# Patient Record
Sex: MALE | Race: WHITE | NOT HISPANIC OR LATINO | Employment: PART TIME | URBAN - METROPOLITAN AREA
[De-identification: names, ages, dates, MRNs, and addresses within clinical notes are randomized per-mention and may not be internally consistent; named-entity substitution may affect disease eponyms.]

---

## 2017-02-08 ENCOUNTER — TRANSFERRED RECORDS (OUTPATIENT)
Dept: HEALTH INFORMATION MANAGEMENT | Facility: CLINIC | Age: 37
End: 2017-02-08

## 2017-04-27 ENCOUNTER — TRANSFERRED RECORDS (OUTPATIENT)
Dept: HEALTH INFORMATION MANAGEMENT | Facility: CLINIC | Age: 37
End: 2017-04-27

## 2019-02-20 ENCOUNTER — TRANSFERRED RECORDS (OUTPATIENT)
Dept: HEALTH INFORMATION MANAGEMENT | Facility: CLINIC | Age: 39
End: 2019-02-20

## 2019-03-04 ENCOUNTER — TRANSFERRED RECORDS (OUTPATIENT)
Dept: HEALTH INFORMATION MANAGEMENT | Facility: CLINIC | Age: 39
End: 2019-03-04

## 2019-03-08 ENCOUNTER — TRANSFERRED RECORDS (OUTPATIENT)
Dept: HEALTH INFORMATION MANAGEMENT | Facility: CLINIC | Age: 39
End: 2019-03-08

## 2019-03-25 ENCOUNTER — TRANSFERRED RECORDS (OUTPATIENT)
Dept: HEALTH INFORMATION MANAGEMENT | Facility: CLINIC | Age: 39
End: 2019-03-25

## 2019-03-28 ENCOUNTER — MEDICAL CORRESPONDENCE (OUTPATIENT)
Dept: HEALTH INFORMATION MANAGEMENT | Facility: CLINIC | Age: 39
End: 2019-03-28

## 2019-05-09 ENCOUNTER — PATIENT OUTREACH (OUTPATIENT)
Dept: SURGERY | Facility: CLINIC | Age: 39
End: 2019-05-09

## 2019-05-09 NOTE — PROGRESS NOTES
CT scan done 3/4/19 at [facility] in [place], [place].  Called and disc will be mailed for upcoming appointment with Dr. Kemp.  Report available for review.

## 2019-05-17 ENCOUNTER — TELEPHONE (OUTPATIENT)
Dept: SURGERY | Facility: CLINIC | Age: 39
End: 2019-05-17

## 2019-05-17 NOTE — TELEPHONE ENCOUNTER
Pre Visit Call and Assessment    Date of call:  05/17/2019    Phone numbers:  Other phone number:  Nurse station at [facility] nurse Fleming confirmed appointment:  Yes  Patient care team/Primary provider:  No primary care provider on file.  Preferred outpatient pharmacy:  No Pharmacies Listed  Referred to:  Dr. Xavier Kemp    Reason for visit:  Ventral hernia,    Problem List:  There is no problem list on file for this patient.      Allergies:   Allergies not on file    History:      No past medical history on file.    No past surgical history on file.    No family history on file.    Social History     Tobacco Use     Smoking status: Not on file   Substance Use Topics     Alcohol use: Not on file

## 2019-05-20 ENCOUNTER — OFFICE VISIT (OUTPATIENT)
Dept: SURGERY | Facility: CLINIC | Age: 39
End: 2019-05-20
Payer: COMMERCIAL

## 2019-05-20 VITALS
HEART RATE: 59 BPM | DIASTOLIC BLOOD PRESSURE: 82 MMHG | TEMPERATURE: 98.4 F | HEIGHT: 69 IN | OXYGEN SATURATION: 99 % | WEIGHT: 270 LBS | BODY MASS INDEX: 39.99 KG/M2 | SYSTOLIC BLOOD PRESSURE: 121 MMHG

## 2019-05-20 DIAGNOSIS — K43.9 VENTRAL HERNIA WITHOUT OBSTRUCTION OR GANGRENE: Primary | ICD-10-CM

## 2019-05-20 ASSESSMENT — PAIN SCALES - GENERAL: PAINLEVEL: MODERATE PAIN (4)

## 2019-05-20 ASSESSMENT — MIFFLIN-ST. JEOR: SCORE: 2130.09

## 2019-05-20 NOTE — LETTER
"5/20/2019       RE: Yo East  [address] [place] [id number]     Dear Colleague,    Thank you for referring your patient, Yo East, to the [facility] GENERAL SURGERY at [facility]. Please see a copy of my visit note below.    Yo East is a 39 year old male with a many year history of a upper midline abdominal mass associated with the following symptoms of lump and pain.    Obstructive symptoms:  no  Urinary difficulties:  no  Chronic cough: no  Constipation:  At times.  Current level of activity:  Low, currently incarcerated    Past medical history, medications, allergies, family history, and social history were reviewed with the patient. Original surgery 2014 for diverticular disease, subsequent has had incisional ventral hernia recurrence twice.  Tobacco - denies  Diabetes - denies  No past medical history on file.    No past surgical history on file.    ROS: 10 point review of systems negative except noted in HPI  CT scan done 3/4/2019 reviewed showing recurrence with mesh remnant wide diastasis associated.    PHYSICAL EXAM  General appearance- alert, and in no distress.  Skin- Skin color and turgor normal.  No obvious rashes.  Neck- Neck is supple without obvious adenopathy.  Lungs- Respiratory effort unlabored.  BMI is estimated at 40 with weight by report of 270 lbs at 5'9\"  Abdomen - overweight, soft non distended, non tender with wide based upper abdominal hernia.    Impression: Recurrent incisional ventral hernia with wide diastasis. Given multiple recurrences, mesh in place and obesity would not recommend surgical intervention.  Recommendation: continued use of binder. Weight loss to goal of 200 lbs over 6 months with weight check when hits goal weight then return for 6 month follow to check weight maintained at goal.  Of note, during interview, patient rather adamant about having surgery and I suggested he seek a second opinion.  The total time spent " with this patient was 30 minutes.  Of this time, greater than 50% was spent counseling and coordinating care.              Again, thank you for allowing me to participate in the care of your patient.      Sincerely,    Xavier Kemp MD

## 2019-05-20 NOTE — LETTER
Date:May 22, 2019      Patient was self referred, no letter generated. Do not send.        [facility] Health Information

## 2019-05-20 NOTE — PROGRESS NOTES
"Yo East is a 39 year old male with a many year history of a upper midline abdominal mass associated with the following symptoms of lump and pain.    Obstructive symptoms:  no  Urinary difficulties:  no  Chronic cough: no  Constipation:  At times.  Current level of activity:  Low, currently incarcerated    Past medical history, medications, allergies, family history, and social history were reviewed with the patient. Original surgery 2014 for diverticular disease, subsequent has had incisional ventral hernia recurrence twice.  Tobacco - denies  Diabetes - denies  No past medical history on file.    No past surgical history on file.    ROS: 10 point review of systems negative except noted in HPI  CT scan done 3/4/2019 reviewed showing recurrence with mesh remnant wide diastasis associated.    PHYSICAL EXAM  General appearance- alert, and in no distress.  Skin- Skin color and turgor normal.  No obvious rashes.  Neck- Neck is supple without obvious adenopathy.  Lungs- Respiratory effort unlabored.  BMI is estimated at 40 with weight by report of 270 lbs at 5'9\"  Abdomen - overweight, soft non distended, non tender with wide based upper abdominal hernia.    Impression: Recurrent incisional ventral hernia with wide diastasis. Given multiple recurrences, mesh in place and obesity would not recommend surgical intervention.  Recommendation: continued use of binder. Weight loss to goal of 200 lbs over 6 months with weight check when hits goal weight then return for 6 month follow to check weight maintained at goal.  Of note, during interview, patient rather adamant about having surgery and I suggested he seek a second opinion.  The total time spent with this patient was 30 minutes.  Of this time, greater than 50% was spent counseling and coordinating care.            "

## 2019-05-20 NOTE — PATIENT INSTRUCTIONS
You met with Dr. Xavier Kemp.      Today's visit instructions:    As recommended obtain a goal weight of 200lbs. Return for follow, up with Dr. Kemp.  After 6 months of weight maintenance at 200lbs will review surgery possibilitiesat that time.     If you have questions please contact Horacio RN or Yoon RN during regular clinic hours, Monday through Friday 7:30 AM - 4:00 PM, or you can contact us via Sis[facility] at anytime.       If you have urgent needs after-hours, weekends, or holidays please call the hospital at [phone] and ask to speak with our on-call General Surgery Team.    Appointment [phone], option #1   Nurse Advice (Horacio or Yoon): [phone]   Surgery Scheduler (Sofia): [phone]  Fax: [phone]

## 2019-05-20 NOTE — NURSING NOTE
"Chief Complaint   Patient presents with     Hernia     New patient consultation, ventral hernia.        Vitals:    05/20/19 0804   BP: 121/82   Pulse: 59   Temp: 98.4  F (36.9  C)   TempSrc: Oral   SpO2: 99%   Weight: 270 lb   Height: 5' 9\"       Body mass index is 39.87 kg/m .    Gage Carmen LPN                        "

## 2020-03-13 ENCOUNTER — RECORDS - HEALTHEAST (OUTPATIENT)
Dept: ADMINISTRATIVE | Facility: OTHER | Age: 40
End: 2020-03-13

## 2020-03-13 ASSESSMENT — MIFFLIN-ST. JEOR: SCORE: 2081.07

## 2020-03-16 ENCOUNTER — RECORDS - HEALTHEAST (OUTPATIENT)
Dept: ADMINISTRATIVE | Facility: OTHER | Age: 40
End: 2020-03-16

## 2020-03-16 ASSESSMENT — MIFFLIN-ST. JEOR: SCORE: 1908.7

## 2020-03-17 ENCOUNTER — SURGERY - HEALTHEAST (OUTPATIENT)
Dept: SURGERY | Facility: CLINIC | Age: 40
End: 2020-03-17

## 2020-04-30 ENCOUNTER — SURGERY - HEALTHEAST (OUTPATIENT)
Dept: SURGERY | Facility: CLINIC | Age: 40
End: 2020-04-30

## 2020-05-11 ENCOUNTER — AMBULATORY - HEALTHEAST (OUTPATIENT)
Dept: SURGERY | Facility: CLINIC | Age: 40
End: 2020-05-11

## 2020-05-11 DIAGNOSIS — Z11.59 ENCOUNTER FOR SCREENING FOR OTHER VIRAL DISEASES: ICD-10-CM

## 2020-05-18 ASSESSMENT — MIFFLIN-ST. JEOR: SCORE: 1949.52

## 2020-05-19 ENCOUNTER — ANESTHESIA - HEALTHEAST (OUTPATIENT)
Dept: SURGERY | Facility: CLINIC | Age: 40
End: 2020-05-19

## 2020-05-20 ENCOUNTER — SURGERY - HEALTHEAST (OUTPATIENT)
Dept: SURGERY | Facility: CLINIC | Age: 40
End: 2020-05-20

## 2020-05-21 ASSESSMENT — MIFFLIN-ST. JEOR: SCORE: 1944.99

## 2020-06-12 ENCOUNTER — RECORDS - HEALTHEAST (OUTPATIENT)
Dept: LAB | Facility: CLINIC | Age: 40
End: 2020-06-12

## 2020-06-12 LAB — C REACTIVE PROTEIN LHE: 2.3 MG/DL (ref 0–0.8)

## 2020-06-15 ENCOUNTER — RECORDS - HEALTHEAST (OUTPATIENT)
Dept: LAB | Facility: CLINIC | Age: 40
End: 2020-06-15

## 2020-06-15 LAB
BASOPHILS # BLD AUTO: 0.1 THOU/UL (ref 0–0.2)
BASOPHILS NFR BLD AUTO: 1 % (ref 0–2)
EOSINOPHIL # BLD AUTO: 0.2 THOU/UL (ref 0–0.4)
EOSINOPHIL NFR BLD AUTO: 4 % (ref 0–6)
ERYTHROCYTE [DISTWIDTH] IN BLOOD BY AUTOMATED COUNT: 11.9 % (ref 11–14.5)
HCT VFR BLD AUTO: 47.8 % (ref 40–54)
HGB BLD-MCNC: 15.9 G/DL (ref 14–18)
LYMPHOCYTES # BLD AUTO: 1.8 THOU/UL (ref 0.8–4.4)
LYMPHOCYTES NFR BLD AUTO: 26 % (ref 20–40)
MCH RBC QN AUTO: 29.6 PG (ref 27–34)
MCHC RBC AUTO-ENTMCNC: 33.3 G/DL (ref 32–36)
MCV RBC AUTO: 89 FL (ref 80–100)
MONOCYTES # BLD AUTO: 0.4 THOU/UL (ref 0–0.9)
MONOCYTES NFR BLD AUTO: 5 % (ref 2–10)
NEUTROPHILS # BLD AUTO: 4.4 THOU/UL (ref 2–7.7)
NEUTROPHILS NFR BLD AUTO: 64 % (ref 50–70)
PLATELET # BLD AUTO: 248 THOU/UL (ref 140–440)
PMV BLD AUTO: 9.9 FL (ref 8.5–12.5)
RBC # BLD AUTO: 5.38 MILL/UL (ref 4.4–6.2)
WBC: 6.9 THOU/UL (ref 4–11)

## 2021-06-04 VITALS — WEIGHT: 228 LBS | BODY MASS INDEX: 34.67 KG/M2 | WEIGHT: 228 LBS | BODY MASS INDEX: 34.67 KG/M2

## 2021-06-04 VITALS
BODY MASS INDEX: 40.45 KG/M2 | BODY MASS INDEX: 39.28 KG/M2 | BODY MASS INDEX: 39.28 KG/M2 | WEIGHT: 266 LBS | WEIGHT: 266 LBS | HEIGHT: 68 IN | BODY MASS INDEX: 40.45 KG/M2 | HEIGHT: 68 IN

## 2021-06-08 NOTE — ANESTHESIA POSTPROCEDURE EVALUATION
Patient: Yo East  Procedure(s):  REPAIR RECURRENT VENTRAL HERNIA  Anesthesia type: general    Patient location: PACU  Last vitals:   Vitals Value Taken Time   /62 5/20/2020  9:50 AM   Temp 36.8  C (98.3  F) 5/20/2020  9:20 AM   Pulse 65 5/20/2020  9:54 AM   Resp 20 5/20/2020  9:50 AM   SpO2 99 % 5/20/2020  9:54 AM   Vitals shown include unvalidated device data.  Post vital signs: stable  Level of consciousness: awake and responds to simple questions  Post-anesthesia pain: pain controlled  Post-anesthesia nausea and vomiting: no  Pulmonary: unassisted, return to baseline  Cardiovascular: stable and blood pressure at baseline  Hydration: adequate  Anesthetic events: no    QCDR Measures:  ASA# 11 - Annel-op Cardiac Arrest: ASA11B - Patient did NOT experience unanticipated cardiac arrest  ASA# 12 - Annel-op Mortality Rate: ASA12B - Patient did NOT die  ASA# 13 - PACU Re-Intubation Rate: NA - No ETT / LMA used for case  ASA# 10 - Composite Anes Safety: ASA10A - No serious adverse event    Additional Notes:

## 2021-06-08 NOTE — ANESTHESIA CARE TRANSFER NOTE
Last vitals:   Vitals:    05/20/20 0920   BP: 161/72   Pulse: 85   Resp: 20   Temp: 36.8  C (98.3  F)   SpO2: 100%     Patient's level of consciousness is awake  Spontaneous respirations: yes  Maintains airway independently: yes  Dentition unchanged: yes  Oropharynx: oropharynx clear of all foreign objects    QCDR Measures:  ASA# 20 - Surgical Safety Checklist: WHO surgical safety checklist completed prior to induction    PQRS# 430 - Adult PONV Prevention: 4558F - Pt received => 2 anti-emetic agents (different classes) preop & intraop  ASA# 8 - Peds PONV Prevention: NA - Not pediatric patient, not GA or 2 or more risk factors NOT present  PQRS# 424 - Annel-op Temp Management: 4559F - At least one body temp DOCUMENTED => 35.5C or 95.9F within required timeframe  PQRS# 426 - PACU Transfer Protocol: - Transfer of care checklist used  ASA# 14 - Acute Post-op Pain: ASA14B - Patient did NOT experience pain >= 7 out of 10

## 2021-06-08 NOTE — ANESTHESIA PROCEDURE NOTES
Peripheral Block    Start time: 5/20/2020 7:58 AM  End time: 5/20/2020 8:01 AM  post-op analgesia per surgeon order as noted in medical record  Staffing:  Performing  Anesthesiologist: Zackary Jimenez MD  Preanesthetic Checklist  Completed: patient identified, site marked, risks, benefits, and alternatives discussed, timeout performed, consent obtained, airway assessed, oxygen available, suction available, emergency drugs available and hand hygiene performed  Peripheral Block  Block type: other, TAP (Right TAP)  Prep: ChloraPrep  Patient position: supine  Patient monitoring: cardiac monitor, continuous pulse oximetry, heart rate and blood pressure  Laterality: right  Injection technique: ultrasound guided    Ultrasound used to visualize needle placement in proximity to nerve being blocked: yes   US used to visualize anesthetic spread  Visualized anatomic structures normal  No Pathological Findings  Permanent ultrasound image captured for medical record  Sterile gel and probe cover used for ultrasound.  Needle  Needle type: Stimuplex   Needle gauge: 20G  Needle length: 4 in    Assessment  Injection assessment: no difficulty with injection, negative aspiration for heme, incremental injection and no paresthesia on injection  Additional Notes  On US exam of the right side rectus sheath muscles, difficulty obtaining clear view of the aponeurosis of the lateral border of the rectus abdominus muscle due to hernia.  Scanned laterally and obtained clear view of lateral abdominal muscle layers. TAP block performed with injection between internal oblique and transversus abdominus.

## 2021-06-08 NOTE — ANESTHESIA PROCEDURE NOTES
Peripheral Block    Patient location during procedure: OR  Start time: 5/20/2020 7:53 AM  End time: 5/20/2020 7:57 AM  post-op analgesia per surgeon order as noted in medical record  Staffing:  Performing  Anesthesiologist: Zackary Jimenez MD  Preanesthetic Checklist  Completed: patient identified, site marked, risks, benefits, and alternatives discussed, timeout performed, consent obtained, airway assessed, oxygen available, suction available, emergency drugs available and hand hygiene performed  Peripheral Block  Block type: otherAnesthesia block type: Left rectus sheath.  Prep: ChloraPrep  Patient position: supine  Patient monitoring: cardiac monitor, continuous pulse oximetry, heart rate and blood pressure  Laterality: left  Injection technique: Doppler guided              Needle  Needle type: Stimuplex   Needle gauge: 21 G  Needle length: 4 in    Assessment  Injection assessment: no difficulty with injection, negative aspiration for heme, no paresthesia on injection and incremental injection

## 2021-06-08 NOTE — ANESTHESIA PREPROCEDURE EVALUATION
Anesthesia Evaluation      Patient summary reviewed   No history of anesthetic complications     Airway   Mallampati: I  Neck ROM: full   Pulmonary - negative ROS and normal exam    breath sounds clear to auscultation  (-) asthma                         Cardiovascular - negative ROS  Rhythm: regular  Rate: normal,         Neuro/Psych - negative ROS     Endo/Other    (+) obesity,      GI/Hepatic/Renal      Comments: Diverticulosis s/p colostomy and takedown, now with ventral hernia          Dental - normal exam                        Anesthesia Plan  Planned anesthetic: general endotracheal and peripheral nerve block  Consented for PNB if surgeon requests  15 ketorolac at end if approved by surgeon  ASA 2   Induction: intravenous   Anesthetic plan and risks discussed with: patient  Anesthesia plan special considerations: antiemetics,   Post-op plan: routine recovery

## 2021-06-16 PROBLEM — Z98.890 S/P REPAIR OF VENTRAL HERNIA: Status: ACTIVE | Noted: 2020-05-20

## 2021-06-16 PROBLEM — J45.20 MILD INTERMITTENT ASTHMA WITHOUT COMPLICATION: Status: ACTIVE | Noted: 2020-05-20

## 2021-06-16 PROBLEM — Z87.19 S/P REPAIR OF VENTRAL HERNIA: Status: ACTIVE | Noted: 2020-05-20

## 2022-01-12 VITALS — BODY MASS INDEX: 34.56 KG/M2 | HEIGHT: 68 IN | WEIGHT: 228 LBS

## 2022-01-18 VITALS — BODY MASS INDEX: 35.77 KG/M2 | WEIGHT: 236 LBS | HEIGHT: 68 IN

## 2024-02-12 ENCOUNTER — OFFICE VISIT (OUTPATIENT)
Dept: FAMILY MEDICINE | Facility: CLINIC | Age: 44
End: 2024-02-12
Payer: COMMERCIAL

## 2024-02-12 VITALS
DIASTOLIC BLOOD PRESSURE: 74 MMHG | TEMPERATURE: 97.1 F | RESPIRATION RATE: 16 BRPM | BODY MASS INDEX: 40.32 KG/M2 | HEART RATE: 91 BPM | SYSTOLIC BLOOD PRESSURE: 118 MMHG | OXYGEN SATURATION: 95 % | HEIGHT: 68 IN | WEIGHT: 266 LBS

## 2024-02-12 DIAGNOSIS — Z23 NEED FOR COVID-19 VACCINE: ICD-10-CM

## 2024-02-12 DIAGNOSIS — Z86.59 HISTORY OF DEPRESSION: ICD-10-CM

## 2024-02-12 DIAGNOSIS — K43.9 VENTRAL HERNIA WITHOUT OBSTRUCTION OR GANGRENE: ICD-10-CM

## 2024-02-12 DIAGNOSIS — Z13.1 SCREENING FOR DIABETES MELLITUS: ICD-10-CM

## 2024-02-12 DIAGNOSIS — M54.50 CHRONIC BILATERAL LOW BACK PAIN WITHOUT SCIATICA: ICD-10-CM

## 2024-02-12 DIAGNOSIS — R53.83 OTHER FATIGUE: ICD-10-CM

## 2024-02-12 DIAGNOSIS — G89.29 CHRONIC BILATERAL LOW BACK PAIN WITHOUT SCIATICA: ICD-10-CM

## 2024-02-12 DIAGNOSIS — Z23 NEEDS FLU SHOT: ICD-10-CM

## 2024-02-12 DIAGNOSIS — M79.672 BILATERAL FOOT PAIN: ICD-10-CM

## 2024-02-12 DIAGNOSIS — Z87.19 S/P REPAIR OF VENTRAL HERNIA: ICD-10-CM

## 2024-02-12 DIAGNOSIS — E78.1 HYPERTRIGLYCERIDEMIA: ICD-10-CM

## 2024-02-12 DIAGNOSIS — J45.20 MILD INTERMITTENT ASTHMA WITHOUT COMPLICATION: ICD-10-CM

## 2024-02-12 DIAGNOSIS — M79.671 BILATERAL FOOT PAIN: ICD-10-CM

## 2024-02-12 DIAGNOSIS — Z00.00 ROUTINE GENERAL MEDICAL EXAMINATION AT A HEALTH CARE FACILITY: Primary | ICD-10-CM

## 2024-02-12 DIAGNOSIS — E66.813 CLASS 3 SEVERE OBESITY WITHOUT SERIOUS COMORBIDITY WITH BODY MASS INDEX (BMI) OF 40.0 TO 44.9 IN ADULT, UNSPECIFIED OBESITY TYPE (H): ICD-10-CM

## 2024-02-12 DIAGNOSIS — Z86.59 HISTORY OF ADHD: ICD-10-CM

## 2024-02-12 DIAGNOSIS — Z11.59 NEED FOR HEPATITIS C SCREENING TEST: ICD-10-CM

## 2024-02-12 DIAGNOSIS — Z23 NEED FOR HEPATITIS B VACCINATION: ICD-10-CM

## 2024-02-12 DIAGNOSIS — Z13.220 SCREENING FOR HYPERLIPIDEMIA: ICD-10-CM

## 2024-02-12 DIAGNOSIS — Z98.890 S/P REPAIR OF VENTRAL HERNIA: ICD-10-CM

## 2024-02-12 DIAGNOSIS — G47.10 EXCESSIVE SLEEPINESS: ICD-10-CM

## 2024-02-12 DIAGNOSIS — Z11.4 SCREENING FOR HIV (HUMAN IMMUNODEFICIENCY VIRUS): ICD-10-CM

## 2024-02-12 DIAGNOSIS — E66.01 CLASS 3 SEVERE OBESITY WITHOUT SERIOUS COMORBIDITY WITH BODY MASS INDEX (BMI) OF 40.0 TO 44.9 IN ADULT, UNSPECIFIED OBESITY TYPE (H): ICD-10-CM

## 2024-02-12 LAB
ALBUMIN SERPL BCG-MCNC: 4.5 G/DL (ref 3.5–5.2)
ALP SERPL-CCNC: 88 U/L (ref 40–150)
ALT SERPL W P-5'-P-CCNC: 16 U/L (ref 0–70)
ANION GAP SERPL CALCULATED.3IONS-SCNC: 11 MMOL/L (ref 7–15)
AST SERPL W P-5'-P-CCNC: 21 U/L (ref 0–45)
BILIRUB SERPL-MCNC: 0.3 MG/DL
BUN SERPL-MCNC: 12.4 MG/DL (ref 6–20)
CALCIUM SERPL-MCNC: 9.6 MG/DL (ref 8.6–10)
CHLORIDE SERPL-SCNC: 104 MMOL/L (ref 98–107)
CHOLEST SERPL-MCNC: 162 MG/DL
CREAT SERPL-MCNC: 0.77 MG/DL (ref 0.67–1.17)
DEPRECATED HCO3 PLAS-SCNC: 25 MMOL/L (ref 22–29)
EGFRCR SERPLBLD CKD-EPI 2021: >90 ML/MIN/1.73M2
ERYTHROCYTE [DISTWIDTH] IN BLOOD BY AUTOMATED COUNT: 12.5 % (ref 10–15)
FASTING STATUS PATIENT QL REPORTED: NO
GLUCOSE SERPL-MCNC: 95 MG/DL (ref 70–99)
HCT VFR BLD AUTO: 44.2 % (ref 40–53)
HDLC SERPL-MCNC: 30 MG/DL
HGB BLD-MCNC: 14.7 G/DL (ref 13.3–17.7)
LDLC SERPL CALC-MCNC: ABNORMAL MG/DL
MCH RBC QN AUTO: 28.1 PG (ref 26.5–33)
MCHC RBC AUTO-ENTMCNC: 33.3 G/DL (ref 31.5–36.5)
MCV RBC AUTO: 85 FL (ref 78–100)
NONHDLC SERPL-MCNC: 132 MG/DL
PLATELET # BLD AUTO: 250 10E3/UL (ref 150–450)
POTASSIUM SERPL-SCNC: 4.4 MMOL/L (ref 3.4–5.3)
PROT SERPL-MCNC: 7.4 G/DL (ref 6.4–8.3)
RBC # BLD AUTO: 5.23 10E6/UL (ref 4.4–5.9)
SODIUM SERPL-SCNC: 140 MMOL/L (ref 135–145)
TRIGL SERPL-MCNC: 418 MG/DL
TSH SERPL DL<=0.005 MIU/L-ACNC: 2.18 UIU/ML (ref 0.3–4.2)
VIT D+METAB SERPL-MCNC: 22 NG/ML (ref 20–50)
WBC # BLD AUTO: 7.8 10E3/UL (ref 4–11)

## 2024-02-12 PROCEDURE — 85027 COMPLETE CBC AUTOMATED: CPT | Performed by: STUDENT IN AN ORGANIZED HEALTH CARE EDUCATION/TRAINING PROGRAM

## 2024-02-12 PROCEDURE — 80053 COMPREHEN METABOLIC PANEL: CPT | Performed by: STUDENT IN AN ORGANIZED HEALTH CARE EDUCATION/TRAINING PROGRAM

## 2024-02-12 PROCEDURE — 99386 PREV VISIT NEW AGE 40-64: CPT | Performed by: STUDENT IN AN ORGANIZED HEALTH CARE EDUCATION/TRAINING PROGRAM

## 2024-02-12 PROCEDURE — 99215 OFFICE O/P EST HI 40 MIN: CPT | Mod: 25 | Performed by: STUDENT IN AN ORGANIZED HEALTH CARE EDUCATION/TRAINING PROGRAM

## 2024-02-12 PROCEDURE — 36415 COLL VENOUS BLD VENIPUNCTURE: CPT | Performed by: STUDENT IN AN ORGANIZED HEALTH CARE EDUCATION/TRAINING PROGRAM

## 2024-02-12 PROCEDURE — 84443 ASSAY THYROID STIM HORMONE: CPT | Performed by: STUDENT IN AN ORGANIZED HEALTH CARE EDUCATION/TRAINING PROGRAM

## 2024-02-12 PROCEDURE — 80061 LIPID PANEL: CPT | Performed by: STUDENT IN AN ORGANIZED HEALTH CARE EDUCATION/TRAINING PROGRAM

## 2024-02-12 PROCEDURE — 82306 VITAMIN D 25 HYDROXY: CPT | Performed by: STUDENT IN AN ORGANIZED HEALTH CARE EDUCATION/TRAINING PROGRAM

## 2024-02-12 RX ORDER — OLANZAPINE 10 MG/1
10 TABLET ORAL AT BEDTIME
Qty: 30 TABLET | Refills: 1 | Status: SHIPPED | OUTPATIENT
Start: 2024-02-12 | End: 2024-03-19

## 2024-02-12 RX ORDER — CYCLOBENZAPRINE HCL 5 MG
5 TABLET ORAL 3 TIMES DAILY PRN
Qty: 30 TABLET | Refills: 0 | Status: SHIPPED | OUTPATIENT
Start: 2024-02-12 | End: 2024-03-19

## 2024-02-12 SDOH — HEALTH STABILITY: PHYSICAL HEALTH: ON AVERAGE, HOW MANY DAYS PER WEEK DO YOU ENGAGE IN MODERATE TO STRENUOUS EXERCISE (LIKE A BRISK WALK)?: 5 DAYS

## 2024-02-12 SDOH — HEALTH STABILITY: PHYSICAL HEALTH: ON AVERAGE, HOW MANY MINUTES DO YOU ENGAGE IN EXERCISE AT THIS LEVEL?: 20 MIN

## 2024-02-12 ASSESSMENT — ASTHMA QUESTIONNAIRES
ACT_TOTALSCORE: 17
ACT_TOTALSCORE: 17
QUESTION_3 LAST FOUR WEEKS HOW OFTEN DID YOUR ASTHMA SYMPTOMS (WHEEZING, COUGHING, SHORTNESS OF BREATH, CHEST TIGHTNESS OR PAIN) WAKE YOU UP AT NIGHT OR EARLIER THAN USUAL IN THE MORNING: TWO OR THREE NIGHTS A WEEK
QUESTION_5 LAST FOUR WEEKS HOW WOULD YOU RATE YOUR ASTHMA CONTROL: WELL CONTROLLED
QUESTION_1 LAST FOUR WEEKS HOW MUCH OF THE TIME DID YOUR ASTHMA KEEP YOU FROM GETTING AS MUCH DONE AT WORK, SCHOOL OR AT HOME: A LITTLE OF THE TIME
QUESTION_2 LAST FOUR WEEKS HOW OFTEN HAVE YOU HAD SHORTNESS OF BREATH: ONCE OR TWICE A WEEK
QUESTION_4 LAST FOUR WEEKS HOW OFTEN HAVE YOU USED YOUR RESCUE INHALER OR NEBULIZER MEDICATION (SUCH AS ALBUTEROL): TWO OR THREE TIMES PER WEEK

## 2024-02-12 ASSESSMENT — SOCIAL DETERMINANTS OF HEALTH (SDOH): HOW OFTEN DO YOU GET TOGETHER WITH FRIENDS OR RELATIVES?: ONCE A WEEK

## 2024-02-12 NOTE — PATIENT INSTRUCTIONS
Preventive Care Advice   This is general advice given by our system to help you stay healthy. However, your care team may have specific advice just for you. Please talk to your care team about your preventive care needs.  Nutrition  Eat 5 or more servings of fruits and vegetables each day.  Try wheat bread, brown rice and whole grain pasta (instead of white bread, rice, and pasta).  Get enough calcium and vitamin D. Check the label on foods and aim for 100% of the RDA (recommended daily allowance).  Lifestyle  Exercise at least 150 minutes each week  (30 minutes a day, 5 days a week).  Do muscle strengthening activities 2 days a week. These help control your weight and prevent disease.  No smoking.  Wear sunscreen to prevent skin cancer.  Have a dental exam and cleaning every 6 months.  Yearly exams  See your health care team every year to talk about:  Any changes in your health.  Any medicines your care team has prescribed.  Preventive care, family planning, and ways to prevent chronic diseases.  Shots (vaccines)   HPV shots (up to age 26), if you've never had them before.  Hepatitis B shots (up to age 59), if you've never had them before.  COVID-19 shot: Get this shot when it's due.  Flu shot: Get a flu shot every year.  Tetanus shot: Get a tetanus shot every 10 years.  Pneumococcal, hepatitis A, and RSV shots: Ask your care team if you need these based on your risk.  Shingles shot (for age 50 and up)  General health tests  Diabetes screening:  Starting at age 35, Get screened for diabetes at least every 3 years.  If you are younger than age 35, ask your care team if you should be screened for diabetes.  Cholesterol test: At age 39, start having a cholesterol test every 5 years, or more often if advised.  Bone density scan (DEXA): At age 50, ask your care team if you should have this scan for osteoporosis (brittle bones).  Hepatitis C: Get tested at least once in your life.  STIs (sexually transmitted  infections)  Before age 24: Ask your care team if you should be screened for STIs.  After age 24: Get screened for STIs if you're at risk. You are at risk for STIs (including HIV) if:  You are sexually active with more than one person.  You don't use condoms every time.  You or a partner was diagnosed with a sexually transmitted infection.  If you are at risk for HIV, ask about PrEP medicine to prevent HIV.  Get tested for HIV at least once in your life, whether you are at risk for HIV or not.  Cancer screening tests  Cervical cancer screening: If you have a cervix, begin getting regular cervical cancer screening tests starting at age 21.  Breast cancer scan (mammogram): If you've ever had breasts, begin having regular mammograms starting at age 40. This is a scan to check for breast cancer.  Colon cancer screening: It is important to start screening for colon cancer at age 45.  Have a colonoscopy test every 10 years (or more often if you're at risk) Or, ask your provider about stool tests like a FIT test every year or Cologuard test every 3 years.  To learn more about your testing options, visit:   https://[email or web address].com/393970.pdf.  For help making a decision, visit:   https://bit.ly/hf86236.  Prostate cancer screening test: If you have a prostate, ask your care team if a prostate cancer screening test (PSA) at age 55 is right for you.  Lung cancer screening: If you are a current or former smoker ages 50 to 80, ask your care team if ongoing lung cancer screenings are right for you.  For informational purposes only. Not to replace the advice of your health care provider. Copyright   2023 [employer]. All rights reserved. Clinically reviewed by the [facility] Transitions Program. Kiyon 488677 - REV 01/24.

## 2024-02-12 NOTE — PROGRESS NOTES
Preventive Care Visit  [facility]  Cornelia Payne MD, Family Medicine  2024      SUBJECTIVE:   Yo is a 44 year old, presenting for the following:  Physical (Would like to discuss about hernia surgery)        2024    11:14 AM   Additional Questions   Roomed by    Accompanied by self     HPI    Today's PHQ-2 Score:       2024    11:02 AM   PHQ-2 (  [employer])   Q1: Little interest or pleasure in doing things 1   Q2: Feeling down, depressed or hopeless 1   PHQ-2 Score 2   Q1: Little interest or pleasure in doing things Several days   Q2: Feeling down, depressed or hopeless Several days   PHQ-2 Score 2       Ventral Hernia  - Was seeing surgeon in [place] - planning for surgery around April, but not scheduled yet. Pt lives here in [place]. Wondering if it will be done sooner here.   - has pain when coughing    Back Pain  - Denies red flags, including: fever, history of IV drug use or malignancy, trauma or injury, loss of bowel or bladder control, saddle anesthesia, numbness, weakness, or tingling of lower extremities.    Bulging vein right thigh    Obesity  - eating 1 meal a day  - riding bike every day  - gained 30 lbs in the last 2 months?    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   No other drug use        Have you ever done Advance Care Planning? (For example, a Health Directive, POLST, or a discussion with a medical provider or your loved ones about your wishes): No, advance care planning information given to patient to review.  Patient declined advance care planning discussion at this time.    Social History     Tobacco Use    Smoking status: Former     Types: Cigarettes     Quit date: 2018     Years since quittin.1    Smokeless tobacco: Never   Substance Use Topics    Alcohol use: Never             2024    11:02 AM   Alcohol  "Use   Prescreen: >3 drinks/day or >7 drinks/week? No       Last PSA: No results found for: \"PSA\"    Reviewed orders with patient. Reviewed health maintenance and updated orders accordingly - Yes  Lab work is in process  Labs reviewed in EPIC    Reviewed and updated as needed this visit by clinical staff   Tobacco  Allergies  Meds  Problems             Reviewed and updated as needed this visit by Provider     Meds  Problems               OBJECTIVE:   /74 (BP Location: Left arm, Patient Position: Sitting, Cuff Size: Adult Large)   Pulse 91   Temp 97.1  F (36.2  C) (Temporal)   Resp 16   Ht 1.72 m (5' 7.72\")   Wt 120.7 kg (266 lb)   SpO2 95%   BMI 40.78 kg/m     Estimated body mass index is 40.78 kg/m  as calculated from the following:    Height as of this encounter: 1.72 m (5' 7.72\").    Weight as of this encounter: 120.7 kg (266 lb).  Physical Exam  General Appearance:  Alert, cooperative, no distress, appears stated age.  Head:  Normocephalic, without obvious abnormality, atraumatic.  Eyes:  Conjunctivae/corneas clear, extraocular movements intact both eyes.  Lungs:  Clear to auscultation bilaterally, respirations unlabored.  Heart:  Regular rate and rhythm, S1 and S2 normal, no murmur, rub or gallop.  Abdomen:  Soft, non-tender, bowel sounds active all four quadrants.  Basketball sized ventral hernia on left side of abdomen, reducible.  Well-healed midline incision.  Extremities:  Atraumatic, no cyanosis or edema.  High arch on the right foot.  Back: No tenderness to palpation of the spine, mild tenderness to palpation of the right SI joint, no sciatica  Skin:  Skin color, texture, turgor normal, no rashes or lesions.  Neurologic: No focal deficits.          ASSESSMENT/PLAN:   Yo was seen today for physical.    Diagnoses and all orders for this visit:    Routine general medical examination at a health care facility  -     PRIMARY CARE FOLLOW-UP SCHEDULING; Future  -     REVIEW OF HEALTH " MAINTENANCE PROTOCOL ORDERS    Mild intermittent asthma without complication  Comments:  ACT score 17.    Ventral hernia without obstruction or gangrene  S/P repair of ventral hernia 5/2020  Comments:  Patient reports that he had repair of a ventral hernia 5/2020 with mesh placed. Now has a Basketball sized ventral hernia on left of the incision/mesh. Was following with surgeon in [place], however surgery was delayed and has not been scheduled yet.  Patient reports pain with coughing, otherwise reducible, with normal bowel movements.  Would like to see if he can get repair sooner with surgeons at [facility].  Transportation will also be easier as patient lives here in [place].  Orders:  -     Adult General Surg Referral; Future    History of ADHD  Comments:  Patient is requesting stimulants.  Dx as a kid. Will await records.  If no record, will refer to psych for testing.    History of depression  Anxiety  Patient reports that he was previously on benzos.  Requested benzos, discussed risks with benzos, patient was okay not restarting benzos for anxiety.  -     OLANZapine (ZYPREXA) 10 MG tablet; Take 1 tablet (10 mg) by mouth at bedtime  -Consider duloxetine in the future for comorbid pain    Bilateral foot pain  High arch on the right foot.  Suspect plantar fasciitis.  -     Orthopedic  Referral; Future  -     Pain Management  Referral; Future    Other fatigue  Excessive sleepiness  Chronic, STOPBang score 4.  Patient was requesting stimulants, however, will need records as noted above.  Given intermediate risk for sleep apnea, recommended sleep study.  Patient was agreeable.    - Patient is also taking olanzapine in the morning.  Discussed switching this to evening.  Patient was agreeable.  -     Adult Sleep Eval & Management  Referral; Future  -     Adult Sleep Eval & Management  Referral; Future  -     TSH with free T4 reflex; Future  -     CBC with platelets;  Future  -     Vitamin D Deficiency; Future  -     TSH with free T4 reflex  -     CBC with platelets  -     Vitamin D Deficiency    Class 3 severe obesity without serious comorbidity with body mass index (BMI) of 40.0 to 44.9 in adult, unspecified obesity type (H)  -     Glucose; Future  -     Comprehensive metabolic panel; Future  -     Comprehensive metabolic panel  -Offered referral to weight management clinic, patient declined.  Would like to try lifestyle changes for now.  -Continue lifestyle modifications    Chronic bilateral low back pain, unspecified whether sciatica present  No red flag signs.  Patient requesting pain medications, reports that he was previously following with pain management in [place] and treated with hydrocodone twice daily as needed.  Discussed that opiates are not a good pain medication for chronic pain.  Trial cyclobenzaprine, could consider duloxetine in the future.  Refer to pain management.  Of note, patient reported a history of meth use with recent relapse August to December 2023.  Otherwise has not used since 2007.  -     Pain Management  Referral; Future  -     cyclobenzaprine (FLEXERIL) 5 MG tablet; Take 1 tablet (5 mg) by mouth 3 times daily as needed for muscle spasms    Screening for HIV (human immunodeficiency virus)  Need for hepatitis C screening test  Discussed, patient reports that he was previously tested and negative.  Declined.    Screening for hyperlipidemia  -     Lipid Profile; Future  -     Lipid Profile    Screening for diabetes mellitus  -     Glucose; Future    Needs flu shot  Need for COVID-19 vaccine  Need for hepatitis B vaccination  Discussed, patient declined.        Patient has been advised of split billing requirements and indicates understanding: Yes      Counseling  Reviewed preventive health counseling, as reflected in patient instructions  40 minutes spent discussing acute/chronic problems in addition to preventative visit.    BMI  Estimated  "body mass index is 40.78 kg/m  as calculated from the following:    Height as of this encounter: 1.72 m (5' 7.72\").    Weight as of this encounter: 120.7 kg (266 lb).   Weight management plan: Discussed healthy diet and exercise guidelines      He reports that he quit smoking about 6 years ago. He has never used smokeless tobacco.            Signed Electronically by: Cornelia Payne MD  Prior to immunization administration, verified patients identity using patient s name and date of birth. Please see Immunization Activity for additional information.     Screening Questionnaire for Adult Immunization    Are you sick today?   No   Do you have allergies to medications, food, a vaccine component or latex?   Yes   Have you ever had a serious reaction after receiving a vaccination?   No   Do you have a long-term health problem with heart, lung, kidney, or metabolic disease (e.g., diabetes), asthma, a blood disorder, no spleen, complement component deficiency, a cochlear implant, or a spinal fluid leak?  Are you on long-term aspirin therapy?   No   Do you have cancer, leukemia, HIV/AIDS, or any other immune system problem?   No   Do you have a parent, brother, or sister with an immune system problem?   No   In the past 3 months, have you taken medications that affect  your immune system, such as prednisone, other steroids, or anticancer drugs; drugs for the treatment of rheumatoid arthritis, Crohn s disease, or psoriasis; or have you had radiation treatments?   No   Have you had a seizure, or a brain or other nervous system problem?   No   During the past year, have you received a transfusion of blood or blood    products, or been given immune (gamma) globulin or antiviral drug?   No   For women: Are you pregnant or is there a chance you could become       pregnant during the next month?   No   Have you received any vaccinations in the past 4 weeks?   No     Immunization questionnaire was positive for at least one answer.  " Notified provider.      Patient instructed to remain in clinic for 15 minutes afterwards, and to report any adverse reactions.     Screening performed by Lakhwinder Garcia MA on 2/12/2024 at 11:20 AM.

## 2024-02-13 NOTE — TELEPHONE ENCOUNTER
REFERRAL INFORMATION:  Referring Provider: Dr. Payne  Referring Clinic: [facility]  Reason for Visit/Diagnosis: S/P repair of ventral hernia, Ventral hernia without obstruction or gangrene        FUTURE VISIT INFORMATION:  Appointment Date: 2/16/2024  Appointment Time: 7:30 AM     NOTES RECORD STATUS  DETAILS   OFFICE NOTE from Referring Provider Internal [facility]:  2/12/24 - PCC OV with Dr. Payne   OFFICE NOTE from Other Specialists Care Everywhere / Internal / Recieved [facility]:  3/29/23, 12/12/22 - GEN SURG OV with Dr. Lepe  11/23/21 - GEN SURG OV with Dr. Gates  8/13/18 - GEN SURG OV with Dr. Abdalla    [facility]:  5/20/19 - GEN SURG OV with Dr. Kemp    [facility]:  2/20/19 - SURG OV with Dr. Gagnon   HOSPITAL DISCHARGE SUMMARY/ ED VISITS  Care Everywhere [facility]:  8/24/21 - ED OV with Dr. Culp   OPERATIVE REPORT Internal [facility]:  5/20/20 - OP Note for REPAIR RECURRENT VENTRAL HERNIA with Dr. Juan   PERTINENT LABS Care Everywhere / Internal    IMAGING (CT, MRI, US, XR)  Received [facility]:  3/30/23 - CT Abd/Pelvis     Records Requested    Facility  [facility]  Fax: [phone]   Outcome * 2/13/24 9:18 AM Faxed urgent request to  for images to be pushed to [place] [facility]. - Ana    * 2/13/24 11:12 AM Images received from  and attached to the patient in PACs. - Ana

## 2024-02-14 ENCOUNTER — TELEPHONE (OUTPATIENT)
Dept: FAMILY MEDICINE | Facility: CLINIC | Age: 44
End: 2024-02-14
Payer: COMMERCIAL

## 2024-02-14 NOTE — TELEPHONE ENCOUNTER
Patient called clinic back and relayed message below.  Patient will call clinic back to schedule a lab only appointment.  No further action needed.    Charu Jacobsen RN  [facility]

## 2024-02-14 NOTE — TELEPHONE ENCOUNTER
RN attempted to contact patient, but no answer. Left message on patient's voice mail to call clinic back.    If patient calls back, please relay message below. Thanks    Charu Jacobsen RN  [facility]    ----- Message from Cornelia Payne MD sent at 2/12/2024  6:43 PM CST -----    Blood counts were normal without any sign of anemia or infection.  Electrolytes, kidney function, liver function were normal.  Vitamin D level was a little low.  I recommend supplementing with vitamin D over-the-counter 1000 to 2000 units daily.  Thyroid hormone levels are normal.    Triglycerides, the fats in your blood, are high.  This can be falsely high if you are not fasting at the time of your lab.  I recommend that we recheck this fasting.  The lab is already been ordered.  You just need to make a lab appointment, preferably in the morning to recheck this.

## 2024-02-16 ENCOUNTER — OFFICE VISIT (OUTPATIENT)
Dept: SURGERY | Facility: CLINIC | Age: 44
End: 2024-02-16
Payer: COMMERCIAL

## 2024-02-16 ENCOUNTER — PRE VISIT (OUTPATIENT)
Dept: SURGERY | Facility: CLINIC | Age: 44
End: 2024-02-16

## 2024-02-16 VITALS
OXYGEN SATURATION: 97 % | DIASTOLIC BLOOD PRESSURE: 72 MMHG | SYSTOLIC BLOOD PRESSURE: 120 MMHG | WEIGHT: 274.1 LBS | HEART RATE: 73 BPM | HEIGHT: 68 IN | BODY MASS INDEX: 41.54 KG/M2

## 2024-02-16 DIAGNOSIS — K43.9 VENTRAL HERNIA WITHOUT OBSTRUCTION OR GANGRENE: Primary | ICD-10-CM

## 2024-02-16 DIAGNOSIS — Z87.19 S/P REPAIR OF VENTRAL HERNIA: ICD-10-CM

## 2024-02-16 DIAGNOSIS — Z98.890 S/P REPAIR OF VENTRAL HERNIA: ICD-10-CM

## 2024-02-16 PROCEDURE — 99204 OFFICE O/P NEW MOD 45 MIN: CPT | Performed by: SURGERY

## 2024-02-16 ASSESSMENT — PAIN SCALES - GENERAL: PAINLEVEL: SEVERE PAIN (7)

## 2024-02-16 NOTE — PROGRESS NOTES
General Surgery Clinic Note - New Patient Visit    NAME: Yo East,  44 year old male    PCP: Cornelia Payne  MRN:   [id number]      #: [phone]  Date: Feb 16, 2024    Chief Complaint:     History of Present Illness: Yo East is a 44 year old male seen in consultation today for recurrent incisional hernia. He has had past abdominal surgery with a laparotomy and colostomy creation s/p takedown for diverticulitis done several years ago. The  colostomy takedown was done with mesh placement  the prior colostomy site. He has had mesh implanted x2 since then. Since that time, he has been trying to lose weight but has a recurrent abdominal bulge consistent with a recurrent hernia and confirmed on recent CT imaging from last year. This has caused him pain in the past but is currently much less symptomatic/ Is worse with exerting himself and relieved some with rest.  No obstructive symptoms. He was recently scheduled for repair at [facility] and surgery was canceled to allow further optimization of both his substance abuse, smoking, weight loss and living situation.      Past Medical History: History in Epic reviewed with the patient:  Past Medical History:   Diagnosis Date    Asthma        Past Surgical History: History in Epic reviewed with the patient:  Past Surgical History:   Procedure Laterality Date    COLON SURGERY      COLON SURGERY N/A     HERNIORRHAPHY VENTRAL N/A 5/20/2020    Procedure: REPAIR RECURRENT VENTRAL HERNIA;  Surgeon: Jong Juan MD;  Location: [facility];  Service: General       Family History: History in Epic reviewed with the patient:  Family History   Problem Relation Age of Onset    Clotting Disorder No family hx of        Social History: History in Epic reviewed with the patient:  Social History     Socioeconomic History    Marital status: Patient Declined     Spouse name: Not on file    Number of children: Not on file    Years of education: Not on file    Highest  education level: Not on file   Occupational History    Not on file   Tobacco Use    Smoking status: Former     Types: Cigarettes     Quit date: 2018     Years since quittin.1    Smokeless tobacco: Never   Substance and Sexual Activity    Alcohol use: Never    Drug use: Never    Sexual activity: Not on file   Other Topics Concern    Not on file   Social History Narrative    Not on file     Social Determinants of Health     Financial Resource Strain: Low Risk  (2024)    Financial Resource Strain     Within the past 12 months, have you or your family members you live with been unable to get utilities (heat, electricity) when it was really needed?: No   Food Insecurity: High Risk (2024)    Food Insecurity     Within the past 12 months, did you worry that your food would run out before you got money to buy more?: Yes     Within the past 12 months, did the food you bought just not last and you didn t have money to get more?: Yes   Transportation Needs: High Risk (2024)    Transportation Needs     Within the past 12 months, has lack of transportation kept you from medical appointments, getting your medicines, non-medical meetings or appointments, work, or from getting things that you need?: Yes   Physical Activity: Insufficiently Active (2024)    Exercise Vital Sign     Days of Exercise per Week: 5 days     Minutes of Exercise per Session: 20 min   Stress: No Stress Concern Present (2024)    [employer] - Occupational Stress Questionnaire     Feeling of Stress : Not at all   Social Connections: Unknown (2024)    Social Connection and Isolation Panel [NHANES]     Frequency of Communication with Friends and Family: Not on file     Frequency of Social Gatherings with Friends and Family: Once a week     Attends Rastafari Services: Not on file     Active Member of Clubs or Organizations: Not on file     Attends Club or Organization Meetings: Not on file      "Marital Status: Not on file   Interpersonal Safety: Low Risk  (2/12/2024)    Interpersonal Safety     Do you feel physically and emotionally safe where you currently live?: Yes     Within the past 12 months, have you been hit, slapped, kicked or otherwise physically hurt by someone?: No     Within the past 12 months, have you been humiliated or emotionally abused in other ways by your partner or ex-partner?: No   Housing Stability: Low Risk  (2/12/2024)    Housing Stability     Do you have housing? : Yes     Are you worried about losing your housing?: No       Allergies:     Allergies   Allergen Reactions    Amoxicillin Hives    Tetracyclines & Related Hives       Outpatient Medications:  Outpatient Encounter Medications as of 2/16/2024   Medication Sig Dispense Refill    cyclobenzaprine (FLEXERIL) 5 MG tablet Take 1 tablet (5 mg) by mouth 3 times daily as needed for muscle spasms 30 tablet 0    levalbuterol (XOPENEX HFA) 45 mcg/actuation inhaler [LEVALBUTEROL (XOPENEX HFA) 45 MCG/ACTUATION INHALER] Inhale 1-2 puffs every 4 (four) hours as needed for wheezing.      OLANZapine (ZYPREXA) 10 MG tablet Take 1 tablet (10 mg) by mouth at bedtime 30 tablet 1     No facility-administered encounter medications on file as of 2/16/2024.         ROS: 10 systems reviewed and all are negative except as above.    EXAM:  Ht 1.721 m (5' 7.75\")   BMI 40.74 kg/m    Psych: Normal affect  Neuro: No gross focal deficits noted  Head:Normocephalic, atraumatic  Eyes: Non icteric  Neck:supple  Heart: Regular rate and rhythm  Lungs: non-labored, quiet respiration  Abdomen: soft, NT/ND, midline scar and RUQ colostomy scar, large upper midline ventral hernia with some loss of domain, NTTP  Extremities: No obvious deformities    Imaging Data (I have personally reviewed the following reports):  No results found for this or any previous visit (from the past 744 hour(s)).    A/P: Yo East is a 44 year old male with with recurrent ventral " hernia    -pt counseled regarding the shrinking number of options for repair given his previous history and recurrences thus emphasizing the importance for optimization. He indicated understanding    -we discussed that preop optimization would be the same here as in [facility]    -he will enroll in comprehensive weight management with target weight loss of BMI<35 with established patterns of ongoing weight loss. He will also quit smoking. He did not want counseling for smoking cessation or substance abuse. I did tell him that use of either would prevent him from having surgery    -he was agreeable and will continue on his plan to be repaired in [facility]. He will return to us if needed of if his plan of care changes.    Zackary Castro MD

## 2024-02-16 NOTE — LETTER
2/16/2024       RE: Yo East  [address]  [place] [address]     Dear Colleague,    Thank you for referring your patient, Yo East, to the [facility] GENERAL SURGERY CLINIC [facility]. Please see a copy of my visit note below.    General Surgery Clinic Note - New Patient Visit    NAME: Yo East,  44 year old male    PCP: Cornelia Payne  MRN:   [id number]      #: [phone]  Date: Feb 16, 2024    Chief Complaint:     History of Present Illness: Yo East is a 44 year old male seen in consultation today for recurrent incisional hernia. He has had past abdominal surgery with a laparotomy and colostomy creation s/p takedown for diverticulitis done several years ago. The  colostomy takedown was done with mesh placement  the prior colostomy site. He has had mesh implanted x2 since then. Since that time, he has been trying to lose weight but has a recurrent abdominal bulge consistent with a recurrent hernia and confirmed on recent CT imaging from last year. This has caused him pain in the past but is currently much less symptomatic/ Is worse with exerting himself and relieved some with rest.  No obstructive symptoms. He was recently scheduled for repair at [facility] and surgery was canceled to allow further optimization of both his substance abuse, smoking, weight loss and living situation.      Past Medical History: History in Epic reviewed with the patient:  Past Medical History:   Diagnosis Date     Asthma        Past Surgical History: History in Epic reviewed with the patient:  Past Surgical History:   Procedure Laterality Date     COLON SURGERY       COLON SURGERY N/A      HERNIORRHAPHY VENTRAL N/A 5/20/2020    Procedure: REPAIR RECURRENT VENTRAL HERNIA;  Surgeon: Jong Juan MD;  Location: [facility];  Service: General       Family History: History in Epic reviewed with the patient:  Family History   Problem  Relation Age of Onset     Clotting Disorder No family hx of        Social History: History in Epic reviewed with the patient:  Social History     Socioeconomic History     Marital status: Patient Declined     Spouse name: Not on file     Number of children: Not on file     Years of education: Not on file     Highest education level: Not on file   Occupational History     Not on file   Tobacco Use     Smoking status: Former     Types: Cigarettes     Quit date: 2018     Years since quittin.1     Smokeless tobacco: Never   Substance and Sexual Activity     Alcohol use: Never     Drug use: Never     Sexual activity: Not on file   Other Topics Concern     Not on file   Social History Narrative     Not on file     Social Determinants of Health     Financial Resource Strain: Low Risk  (2024)    Financial Resource Strain      Within the past 12 months, have you or your family members you live with been unable to get utilities (heat, electricity) when it was really needed?: No   Food Insecurity: High Risk (2024)    Food Insecurity      Within the past 12 months, did you worry that your food would run out before you got money to buy more?: Yes      Within the past 12 months, did the food you bought just not last and you didn t have money to get more?: Yes   Transportation Needs: High Risk (2024)    Transportation Needs      Within the past 12 months, has lack of transportation kept you from medical appointments, getting your medicines, non-medical meetings or appointments, work, or from getting things that you need?: Yes   Physical Activity: Insufficiently Active (2024)    Exercise Vital Sign      Days of Exercise per Week: 5 days      Minutes of Exercise per Session: 20 min   Stress: No Stress Concern Present (2024)    [employer] [place] [employer] - Occupational Stress Questionnaire      Feeling of Stress : Not at all   Social Connections: Unknown (2024)    Social Connection  "and Isolation Panel [NHANES]      Frequency of Communication with Friends and Family: Not on file      Frequency of Social Gatherings with Friends and Family: Once a week      Attends Confucianist Services: Not on file      Active Member of Clubs or Organizations: Not on file      Attends Club or Organization Meetings: Not on file      Marital Status: Not on file   Interpersonal Safety: Low Risk  (2/12/2024)    Interpersonal Safety      Do you feel physically and emotionally safe where you currently live?: Yes      Within the past 12 months, have you been hit, slapped, kicked or otherwise physically hurt by someone?: No      Within the past 12 months, have you been humiliated or emotionally abused in other ways by your partner or ex-partner?: No   Housing Stability: Low Risk  (2/12/2024)    Housing Stability      Do you have housing? : Yes      Are you worried about losing your housing?: No       Allergies:     Allergies   Allergen Reactions     Amoxicillin Hives     Tetracyclines & Related Hives       Outpatient Medications:  Outpatient Encounter Medications as of 2/16/2024   Medication Sig Dispense Refill     cyclobenzaprine (FLEXERIL) 5 MG tablet Take 1 tablet (5 mg) by mouth 3 times daily as needed for muscle spasms 30 tablet 0     levalbuterol (XOPENEX HFA) 45 mcg/actuation inhaler [LEVALBUTEROL (XOPENEX HFA) 45 MCG/ACTUATION INHALER] Inhale 1-2 puffs every 4 (four) hours as needed for wheezing.       OLANZapine (ZYPREXA) 10 MG tablet Take 1 tablet (10 mg) by mouth at bedtime 30 tablet 1     No facility-administered encounter medications on file as of 2/16/2024.         ROS: 10 systems reviewed and all are negative except as above.    EXAM:  Ht 1.721 m (5' 7.75\")   BMI 40.74 kg/m    Psych: Normal affect  Neuro: No gross focal deficits noted  Head:Normocephalic, atraumatic  Eyes: Non icteric  Neck:supple  Heart: Regular rate and rhythm  Lungs: non-labored, quiet respiration  Abdomen: soft, NT/ND, midline scar and " RUQ colostomy scar, large upper midline ventral hernia with some loss of domain, NTTP  Extremities: No obvious deformities    Imaging Data (I have personally reviewed the following reports):  No results found for this or any previous visit (from the past 744 hour(s)).    A/P: Yo East is a 44 year old male with with recurrent ventral hernia    -pt counseled regarding the shrinking number of options for repair given his previous history and recurrences thus emphasizing the importance for optimization. He indicated understanding    -we discussed that preop optimization would be the same here as in [facility]    -he will enroll in comprehensive weight management with target weight loss of BMI<35 with established patterns of ongoing weight loss. He will also quit smoking. He did not want counseling for smoking cessation or substance abuse. I did tell him that use of either would prevent him from having surgery    -he was agreeable and will continue on his plan to be repaired in [facility]. He will return to us if needed of if his plan of care changes.    Zackary Castro MD         Again, thank you for allowing me to participate in the care of your patient.      Sincerely,    Zackary Castro MD

## 2024-02-16 NOTE — PATIENT INSTRUCTIONS
You met with Dr. Zackary Castro.      Today's visit instructions:    A referral was placed for you to consult with Medical Weight Management and they will call you directly to arrange this visit.    Dr. Zackary Castro would like you to quit smoking/quit vaping and lose weight to a BMI of 35. Please return to the clinic when these goals have been achieved for a follow up visit, urine nicotine test (results take 7-10 days), and to discuss surgery.  Please call [phone] to arrange an appointment. You can also follow locally with your surgeon.  1.  Weight loss resource:  Medical Weight Management: [phone]  2.  Smoking cessation: It is not easy to quit smoking but there are resources available to help. If you currently smoke please contact your health plan or one of the following organizations for help.  [facility] and [facility] offer group classes or one-on-one coaching:  *  [place] [facility] Group Tobacco Cessation: Small group sessions held throughout the [facility] area for people who are trying to live free from tobacco. To reserve your spot or for additional information, please call [phone] or go to [email or web address].  *  One-on-one health coaching:   If you d prefer to work individually with a health care provider on tobacco cessation, we offer:  Medication Therapy Management: Our specially trained pharmacists work closely with you and your doctor to help you quit smoking. Call [phone] or [phone] (toll free).  Can Do: Health coaching offered by [facility]. Visit [email or web address].       [place] Smoking Cessation: Health Coaching. Call [phone].   If you have questions please contact Yoon MARVIN or Brittni MARVIN during regular clinic hours, Monday through Friday 7:30 AM - 4:00 PM, or you can contact us via [place] [employer] at anytime.       If you have urgent needs after-hours, weekends, or holidays please call the hospital at  [phone] and ask to speak with our on-call General Surgery Team.    Appointment [phone]  Nurse Advice (Yoon or Brittni): [phone]   Surgery Scheduler (Deborah): [phone]  Fax: [phone]

## 2024-02-16 NOTE — NURSING NOTE
"Chief Complaint   Patient presents with    New Patient     Possible recurrent ventral hernia       Vitals:    02/16/24 0726   BP: 120/72   BP Location: Left arm   Patient Position: Sitting   Cuff Size: Adult Large   Pulse: 73   SpO2: 97%   Weight: 124.3 kg (274 lb 1.6 oz)   Height: 1.721 m (5' 7.75\")       Body mass index is 41.98 kg/m .                          Juancarlos Ibarra, EMT    "

## 2024-02-21 ENCOUNTER — TRANSFERRED RECORDS (OUTPATIENT)
Dept: HEALTH INFORMATION MANAGEMENT | Facility: CLINIC | Age: 44
End: 2024-02-21
Payer: COMMERCIAL

## 2024-02-21 NOTE — TELEPHONE ENCOUNTER
DIAGNOSIS: MARCELLO foot pain/Dr. Payne/Blue plus/ortho con(High arch on the right foot. Suspect plantar fasciitis.    APPOINTMENT DATE: 3/15/24   NOTES STATUS DETAILS   OFFICE NOTE from referring provider Internal  2/12/24 - Cornelia Payne MD - [facility]    MEDICATION LIST Internal

## 2024-02-23 ENCOUNTER — TELEPHONE (OUTPATIENT)
Dept: FAMILY MEDICINE | Facility: CLINIC | Age: 44
End: 2024-02-23
Payer: COMMERCIAL

## 2024-02-23 NOTE — TELEPHONE ENCOUNTER
Forms/Letter Request    Type of form/letter: OTHER: [employer] transportation mileage exception          Do we have the form/letter: Yes:     Who is the form from? [employer] transportation  (if other please explain)    Where did/will the form come from? form was faxed in    When is form/letter needed by: N/A    How would you like the form/letter returned: N/A

## 2024-02-26 NOTE — TELEPHONE ENCOUNTER
Completed for hernia repair at [facility] in [place].  Please scan a copy into patient's chart and contact patient to obtain a copy.

## 2024-03-05 ENCOUNTER — OFFICE VISIT (OUTPATIENT)
Dept: ANESTHESIOLOGY | Facility: CLINIC | Age: 44
End: 2024-03-05
Attending: STUDENT IN AN ORGANIZED HEALTH CARE EDUCATION/TRAINING PROGRAM
Payer: COMMERCIAL

## 2024-03-05 VITALS
OXYGEN SATURATION: 97 % | HEIGHT: 68 IN | HEART RATE: 83 BPM | SYSTOLIC BLOOD PRESSURE: 124 MMHG | WEIGHT: 274 LBS | DIASTOLIC BLOOD PRESSURE: 77 MMHG | BODY MASS INDEX: 41.52 KG/M2

## 2024-03-05 DIAGNOSIS — M79.671 BILATERAL FOOT PAIN: ICD-10-CM

## 2024-03-05 DIAGNOSIS — M79.672 BILATERAL FOOT PAIN: ICD-10-CM

## 2024-03-05 DIAGNOSIS — M54.50 CHRONIC BILATERAL LOW BACK PAIN WITHOUT SCIATICA: ICD-10-CM

## 2024-03-05 DIAGNOSIS — G89.29 CHRONIC BILATERAL LOW BACK PAIN WITHOUT SCIATICA: ICD-10-CM

## 2024-03-05 PROCEDURE — 99204 OFFICE O/P NEW MOD 45 MIN: CPT | Performed by: ANESTHESIOLOGY

## 2024-03-05 ASSESSMENT — ANXIETY QUESTIONNAIRES
7. FEELING AFRAID AS IF SOMETHING AWFUL MIGHT HAPPEN: SEVERAL DAYS
7. FEELING AFRAID AS IF SOMETHING AWFUL MIGHT HAPPEN: SEVERAL DAYS
IF YOU CHECKED OFF ANY PROBLEMS ON THIS QUESTIONNAIRE, HOW DIFFICULT HAVE THESE PROBLEMS MADE IT FOR YOU TO DO YOUR WORK, TAKE CARE OF THINGS AT HOME, OR GET ALONG WITH OTHER PEOPLE: SOMEWHAT DIFFICULT
8. IF YOU CHECKED OFF ANY PROBLEMS, HOW DIFFICULT HAVE THESE MADE IT FOR YOU TO DO YOUR WORK, TAKE CARE OF THINGS AT HOME, OR GET ALONG WITH OTHER PEOPLE?: SOMEWHAT DIFFICULT
5. BEING SO RESTLESS THAT IT IS HARD TO SIT STILL: SEVERAL DAYS
4. TROUBLE RELAXING: SEVERAL DAYS
GAD7 TOTAL SCORE: 7
6. BECOMING EASILY ANNOYED OR IRRITABLE: SEVERAL DAYS
3. WORRYING TOO MUCH ABOUT DIFFERENT THINGS: SEVERAL DAYS
1. FEELING NERVOUS, ANXIOUS, OR ON EDGE: SEVERAL DAYS
GAD7 TOTAL SCORE: 7
2. NOT BEING ABLE TO STOP OR CONTROL WORRYING: SEVERAL DAYS

## 2024-03-05 ASSESSMENT — PAIN SCALES - PAIN ENJOYMENT GENERAL ACTIVITY SCALE (PEG)
AVG_PAIN_PASTWEEK: 6
INTERFERED_ENJOYMENT_LIFE: 6
AVG_PAIN_PASTWEEK: 6
PEG_TOTALSCORE: 6.33
INTERFERED_GENERAL_ACTIVITY: 7
PEG_TOTALSCORE: 6.33
INTERFERED_GENERAL_ACTIVITY: 7
INTERFERED_ENJOYMENT_LIFE: 6

## 2024-03-05 ASSESSMENT — PAIN SCALES - GENERAL: PAINLEVEL: SEVERE PAIN (6)

## 2024-03-05 NOTE — NURSING NOTE
Patient presents with:  Consult: Consult Lower Back both feet      Severe Pain (6)         What medications are you using for pain? Ibuprofen, Flexeril    (New patients only) Have you been seen by another pain clinic/ provider? yes    (Return Patients only) What refills are you needing today? No    Expectation some pain medication until surgery
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
## 2024-03-05 NOTE — LETTER
March 5, 2024      To whom it may concern:    This is to certify that Yo MATILDE East was seen in our clinic today, 3/5/2024.    Please excuse him during this time.       Sincerely,    Dr. Tasneem Flores

## 2024-03-15 ENCOUNTER — OFFICE VISIT (OUTPATIENT)
Dept: ORTHOPEDICS | Facility: CLINIC | Age: 44
End: 2024-03-15
Payer: COMMERCIAL

## 2024-03-15 ENCOUNTER — PRE VISIT (OUTPATIENT)
Dept: ORTHOPEDICS | Facility: CLINIC | Age: 44
End: 2024-03-15

## 2024-03-15 DIAGNOSIS — Q66.71 PES CAVUS OF RIGHT FOOT: ICD-10-CM

## 2024-03-15 DIAGNOSIS — M79.671 PAIN IN BOTH FEET: Primary | ICD-10-CM

## 2024-03-15 DIAGNOSIS — M79.672 PAIN IN BOTH FEET: Primary | ICD-10-CM

## 2024-03-15 DIAGNOSIS — S93.491A SPRAIN OF ANTERIOR TALOFIBULAR LIGAMENT OF RIGHT ANKLE, INITIAL ENCOUNTER: ICD-10-CM

## 2024-03-15 PROCEDURE — 99203 OFFICE O/P NEW LOW 30 MIN: CPT | Performed by: PODIATRIST

## 2024-03-15 NOTE — LETTER
3/15/2024         RE: Yo East  [address]  [place] [address]        Dear Colleague,    Thank you for referring your patient, Yo East, to the [facility]. Please see a copy of my visit note below.    Date of Service: 3/15/2024    Chief Complaint:   Chief Complaint   Patient presents with    Consult     MARCELLO foot pain/Dr. Payne/Blue plus/ortho con(High arch on the right foot.  Suspect plantar fasciitis.        HPI: Yo is a 44 year old male who presents today for further evaluation of BL foot pain.  Nature: sharp and dull    Location: BL ATFLs R>L    Duration: 4 months    Onset: gradual. No trauma    Course: getting better.     Aggravating/alleviating factors: Walking and weight bearing aggravate. Rest alleviates.     Previous Treatments: Orthotics were helpful. 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      Review of Systems: No n/v/d/f/c/ns/sob/cp    PMH:   Past Medical History:   Diagnosis Date    Asthma        PSxH:   Past Surgical History:   Procedure Laterality Date    COLON SURGERY      COLON SURGERY N/A     HERNIORRHAPHY VENTRAL N/A 2020    Procedure: REPAIR RECURRENT VENTRAL HERNIA;  Surgeon: Jong Juan MD;  Location: [facility] Main OR;  Service: General       Allergies: Amoxicillin and Tetracyclines & related    SH:   Social History     Socioeconomic History    Marital status: Patient Declined     Spouse name: Not on file    Number of children: Not on file    Years of education: Not on file    Highest education level: Not on file   Occupational History    Not on file   Tobacco Use    Smoking status: Former     Types: Cigarettes     Quit date: 2018     Years since quittin.2    Smokeless tobacco: Never   Substance and Sexual Activity    Alcohol use: Never    Drug use: Never    Sexual activity: Not on file   Other Topics Concern    Not on file   Social History Narrative    Not on file     Social Determinants of Health      Financial Resource Strain: Low Risk  (2/12/2024)    Financial Resource Strain     Within the past 12 months, have you or your family members you live with been unable to get utilities (heat, electricity) when it was really needed?: No   Food Insecurity: High Risk (2/12/2024)    Food Insecurity     Within the past 12 months, did you worry that your food would run out before you got money to buy more?: Yes     Within the past 12 months, did the food you bought just not last and you didn t have money to get more?: Yes   Transportation Needs: High Risk (2/12/2024)    Transportation Needs     Within the past 12 months, has lack of transportation kept you from medical appointments, getting your medicines, non-medical meetings or appointments, work, or from getting things that you need?: Yes   Physical Activity: Insufficiently Active (2/12/2024)    Exercise Vital Sign     Days of Exercise per Week: 5 days     Minutes of Exercise per Session: 20 min   Stress: No Stress Concern Present (2/12/2024)    [employer] [place] [employer]ational [employer] - Occupational Stress Questionnaire     Feeling of Stress : Not at all   Social Connections: Unknown (2/12/2024)    Social Connection and Isolation Panel [NHANES]     Frequency of Communication with Friends and Family: Not on file     Frequency of Social Gatherings with Friends and Family: Once a week     Attends Jehovah's witness Services: Not on file     Active Member of Clubs or Organizations: Not on file     Attends Club or Organization Meetings: Not on file     Marital Status: Not on file   Interpersonal Safety: Low Risk  (2/12/2024)    Interpersonal Safety     Do you feel physically and emotionally safe where you currently live?: Yes     Within the past 12 months, have you been hit, slapped, kicked or otherwise physically hurt by someone?: No     Within the past 12 months, have you been humiliated or emotionally abused in other ways by your partner or ex-partner?: No   Housing  Stability: Low Risk  (2/12/2024)    Housing Stability     Do you have housing? : Yes     Are you worried about losing your housing?: No       FH:   Family History   Problem Relation Age of Onset    Clotting Disorder No family hx of        Objective:      PT and DP pulses are 2/4 bilaterally. CRT is instant.  Positive pedal hair.   Gross sensation is intact bilaterally.   Equinus is noted bilaterally, R>>L. No pain with active or passive ROM of the ankle, MTJ, 1st ray, or halluces bilaterally,. Pain noted in BL ATFLs BL. Pain with talar tilt in the areas. Right foot is a corrected clubfoot and is in cavus.   Nails normal bilaterally. No open lesions are noted.     Assessment:   Encounter Diagnoses   Name Primary?    Pain in both feet Yes    Sprain of anterior talofibular ligament of right ankle, initial encounter     Pes cavus of right foot          Plan:  - Pt seen and evaluated.  - Start Tri-Lock on the right ankle.  - Order for PT.  - Orthotics molded and sent to the lab.  - See again PRN.          Kyler Reeves DPM

## 2024-03-15 NOTE — PROGRESS NOTES
DME FITTING    Relevant Diagnosis: Right foot pain  TriLock brace was fit on patient's Right foot.     Person(s) involved in teaching:   Patient    Brace was applied in standard [place]:  Yes  Brace fit well:  Yes  Patient reports brace to fit comfortably:  Yes    Education:   Patient shown self application and removal of brace: Yes  Patient shown how to adjust brace fit, if necessary: Yes  Patient educated on billing and return policy: Yes  Patient confirmed understanding when and how to contact clinic with concerns: Yes    Juliet Saba LPN

## 2024-03-15 NOTE — PROGRESS NOTES
Date of Service: 3/15/2024    Chief Complaint:   Chief Complaint   Patient presents with    Consult     MARCELLO foot pain/Dr. Payne/Blue plus/ortho con(High arch on the right foot.  Suspect plantar fasciitis.        HPI: Yo is a 44 year old male who presents today for further evaluation of BL foot pain.  Nature: sharp and dull    Location: BL ATFLs R>L    Duration: 4 months    Onset: gradual. No trauma    Course: getting better.     Aggravating/alleviating factors: Walking and weight bearing aggravate. Rest alleviates.     Previous Treatments: Orthotics were helpful. 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      Review of Systems: No n/v/d/f/c/ns/sob/cp    PMH:   Past Medical History:   Diagnosis Date    Asthma        PSxH:   Past Surgical History:   Procedure Laterality Date    COLON SURGERY      COLON SURGERY N/A     HERNIORRHAPHY VENTRAL N/A 2020    Procedure: REPAIR RECURRENT VENTRAL HERNIA;  Surgeon: Jong Juan MD;  Location: [facility] Main OR;  Service: General       Allergies: Amoxicillin and Tetracyclines & related    SH:   Social History     Socioeconomic History    Marital status: Patient Declined     Spouse name: Not on file    Number of children: Not on file    Years of education: Not on file    Highest education level: Not on file   Occupational History    Not on file   Tobacco Use    Smoking status: Former     Types: Cigarettes     Quit date: 2018     Years since quittin.2    Smokeless tobacco: Never   Substance and Sexual Activity    Alcohol use: Never    Drug use: Never    Sexual activity: Not on file   Other Topics Concern    Not on file   Social History Narrative    Not on file     Social Determinants of Health     Financial Resource Strain: Low Risk  (2024)    Financial Resource Strain     Within the past 12 months, have you or your family members you live with been unable to get utilities (heat, electricity) when it was really needed?: No   Food Insecurity: High  Risk (2/12/2024)    Food Insecurity     Within the past 12 months, did you worry that your food would run out before you got money to buy more?: Yes     Within the past 12 months, did the food you bought just not last and you didn t have money to get more?: Yes   Transportation Needs: High Risk (2/12/2024)    Transportation Needs     Within the past 12 months, has lack of transportation kept you from medical appointments, getting your medicines, non-medical meetings or appointments, work, or from getting things that you need?: Yes   Physical Activity: Insufficiently Active (2/12/2024)    Exercise Vital Sign     Days of Exercise per Week: 5 days     Minutes of Exercise per Session: 20 min   Stress: No Stress Concern Present (2/12/2024)    [employer] [place] [employer]ational [employer] - Occupational Stress Questionnaire     Feeling of Stress : Not at all   Social Connections: Unknown (2/12/2024)    Social Connection and Isolation Panel [NHANES]     Frequency of Communication with Friends and Family: Not on file     Frequency of Social Gatherings with Friends and Family: Once a week     Attends Evangelical Services: Not on file     Active Member of Clubs or Organizations: Not on file     Attends Club or Organization Meetings: Not on file     Marital Status: Not on file   Interpersonal Safety: Low Risk  (2/12/2024)    Interpersonal Safety     Do you feel physically and emotionally safe where you currently live?: Yes     Within the past 12 months, have you been hit, slapped, kicked or otherwise physically hurt by someone?: No     Within the past 12 months, have you been humiliated or emotionally abused in other ways by your partner or ex-partner?: No   Housing Stability: Low Risk  (2/12/2024)    Housing Stability     Do you have housing? : Yes     Are you worried about losing your housing?: No       FH:   Family History   Problem Relation Age of Onset    Clotting Disorder No family hx of        Objective:      PT and DP pulses  are 2/4 bilaterally. CRT is instant.  Positive pedal hair.   Gross sensation is intact bilaterally.   Equinus is noted bilaterally, R>>L. No pain with active or passive ROM of the ankle, MTJ, 1st ray, or halluces bilaterally,. Pain noted in BL ATFLs BL. Pain with talar tilt in the areas. Right foot is a corrected clubfoot and is in cavus.   Nails normal bilaterally. No open lesions are noted.     Assessment:   Encounter Diagnoses   Name Primary?    Pain in both feet Yes    Sprain of anterior talofibular ligament of right ankle, initial encounter     Pes cavus of right foot          Plan:  - Pt seen and evaluated.  - Start Tri-Lock on the right ankle.  - Order for PT.  - Orthotics molded and sent to the lab.  - See again PRN.

## 2024-03-19 ENCOUNTER — OFFICE VISIT (OUTPATIENT)
Dept: FAMILY MEDICINE | Facility: CLINIC | Age: 44
End: 2024-03-19
Payer: COMMERCIAL

## 2024-03-19 VITALS
HEIGHT: 69 IN | SYSTOLIC BLOOD PRESSURE: 113 MMHG | BODY MASS INDEX: 40.14 KG/M2 | HEART RATE: 94 BPM | DIASTOLIC BLOOD PRESSURE: 75 MMHG | WEIGHT: 271 LBS | TEMPERATURE: 97.9 F | OXYGEN SATURATION: 97 % | RESPIRATION RATE: 24 BRPM

## 2024-03-19 DIAGNOSIS — F33.1 MODERATE EPISODE OF RECURRENT MAJOR DEPRESSIVE DISORDER (H): ICD-10-CM

## 2024-03-19 DIAGNOSIS — Z23 NEED FOR PROPHYLACTIC VACCINATION AGAINST STREPTOCOCCUS PNEUMONIAE (PNEUMOCOCCUS): ICD-10-CM

## 2024-03-19 DIAGNOSIS — I83.91 VARICOSE VEINS OF RIGHT LOWER EXTREMITY, UNSPECIFIED WHETHER COMPLICATED: ICD-10-CM

## 2024-03-19 DIAGNOSIS — Z23 NEEDS FLU SHOT: ICD-10-CM

## 2024-03-19 DIAGNOSIS — M54.50 CHRONIC BILATERAL LOW BACK PAIN WITHOUT SCIATICA: Primary | ICD-10-CM

## 2024-03-19 DIAGNOSIS — G89.29 CHRONIC BILATERAL LOW BACK PAIN WITHOUT SCIATICA: Primary | ICD-10-CM

## 2024-03-19 DIAGNOSIS — E66.01 CLASS 3 SEVERE OBESITY WITHOUT SERIOUS COMORBIDITY WITH BODY MASS INDEX (BMI) OF 40.0 TO 44.9 IN ADULT, UNSPECIFIED OBESITY TYPE (H): ICD-10-CM

## 2024-03-19 DIAGNOSIS — E66.813 CLASS 3 SEVERE OBESITY WITHOUT SERIOUS COMORBIDITY WITH BODY MASS INDEX (BMI) OF 40.0 TO 44.9 IN ADULT, UNSPECIFIED OBESITY TYPE (H): ICD-10-CM

## 2024-03-19 DIAGNOSIS — Z23 NEED FOR HEPATITIS B VACCINATION: ICD-10-CM

## 2024-03-19 DIAGNOSIS — Z23 NEED FOR COVID-19 VACCINE: ICD-10-CM

## 2024-03-19 PROBLEM — I10 HTN (HYPERTENSION): Status: ACTIVE | Noted: 2021-07-04

## 2024-03-19 PROBLEM — F43.10 PTSD (POST-TRAUMATIC STRESS DISORDER): Status: ACTIVE | Noted: 2021-07-04

## 2024-03-19 PROBLEM — K43.2 RECURRENT VENTRAL INCISIONAL HERNIA: Status: ACTIVE | Noted: 2023-06-09

## 2024-03-19 PROBLEM — G56.02 CARPAL TUNNEL SYNDROME OF LEFT WRIST: Status: ACTIVE | Noted: 2021-07-04

## 2024-03-19 PROCEDURE — 99214 OFFICE O/P EST MOD 30 MIN: CPT | Performed by: STUDENT IN AN ORGANIZED HEALTH CARE EDUCATION/TRAINING PROGRAM

## 2024-03-19 RX ORDER — IBUPROFEN 600 MG/1
600 TABLET, FILM COATED ORAL EVERY 6 HOURS PRN
Qty: 30 TABLET | Refills: 1 | Status: SHIPPED | OUTPATIENT
Start: 2024-03-19

## 2024-03-19 RX ORDER — BUPROPION HYDROCHLORIDE 150 MG/1
150 TABLET ORAL EVERY MORNING
Qty: 90 TABLET | Refills: 1 | Status: SHIPPED | OUTPATIENT
Start: 2024-03-19 | End: 2024-04-18 | Stop reason: DRUGHIGH

## 2024-03-19 RX ORDER — ACETAMINOPHEN 500 MG
500-1000 TABLET ORAL EVERY 8 HOURS PRN
Qty: 90 TABLET | Refills: 3 | Status: SHIPPED | OUTPATIENT
Start: 2024-03-19

## 2024-03-19 RX ORDER — CLONIDINE HYDROCHLORIDE 0.1 MG/1
TABLET ORAL
COMMUNITY
Start: 2024-01-29 | End: 2024-03-19

## 2024-03-19 RX ORDER — OLANZAPINE 10 MG/1
10 TABLET ORAL AT BEDTIME
Qty: 90 TABLET | Refills: 1 | Status: SHIPPED | OUTPATIENT
Start: 2024-03-19

## 2024-03-19 RX ORDER — CYCLOBENZAPRINE HCL 5 MG
5 TABLET ORAL 3 TIMES DAILY PRN
Qty: 30 TABLET | Refills: 3 | Status: SHIPPED | OUTPATIENT
Start: 2024-03-19

## 2024-03-19 ASSESSMENT — ASTHMA QUESTIONNAIRES
QUESTION_2 LAST FOUR WEEKS HOW OFTEN HAVE YOU HAD SHORTNESS OF BREATH: ONCE OR TWICE A WEEK
ACT_TOTALSCORE: 21
QUESTION_3 LAST FOUR WEEKS HOW OFTEN DID YOUR ASTHMA SYMPTOMS (WHEEZING, COUGHING, SHORTNESS OF BREATH, CHEST TIGHTNESS OR PAIN) WAKE YOU UP AT NIGHT OR EARLIER THAN USUAL IN THE MORNING: NOT AT ALL
ACT_TOTALSCORE: 21
QUESTION_5 LAST FOUR WEEKS HOW WOULD YOU RATE YOUR ASTHMA CONTROL: WELL CONTROLLED
QUESTION_4 LAST FOUR WEEKS HOW OFTEN HAVE YOU USED YOUR RESCUE INHALER OR NEBULIZER MEDICATION (SUCH AS ALBUTEROL): ONCE A WEEK OR LESS
QUESTION_1 LAST FOUR WEEKS HOW MUCH OF THE TIME DID YOUR ASTHMA KEEP YOU FROM GETTING AS MUCH DONE AT WORK, SCHOOL OR AT HOME: A LITTLE OF THE TIME

## 2024-03-19 ASSESSMENT — ANXIETY QUESTIONNAIRES
3. WORRYING TOO MUCH ABOUT DIFFERENT THINGS: SEVERAL DAYS
5. BEING SO RESTLESS THAT IT IS HARD TO SIT STILL: NOT AT ALL
1. FEELING NERVOUS, ANXIOUS, OR ON EDGE: SEVERAL DAYS
7. FEELING AFRAID AS IF SOMETHING AWFUL MIGHT HAPPEN: SEVERAL DAYS
7. FEELING AFRAID AS IF SOMETHING AWFUL MIGHT HAPPEN: SEVERAL DAYS
6. BECOMING EASILY ANNOYED OR IRRITABLE: NOT AT ALL
GAD7 TOTAL SCORE: 4
GAD7 TOTAL SCORE: 4
IF YOU CHECKED OFF ANY PROBLEMS ON THIS QUESTIONNAIRE, HOW DIFFICULT HAVE THESE PROBLEMS MADE IT FOR YOU TO DO YOUR WORK, TAKE CARE OF THINGS AT HOME, OR GET ALONG WITH OTHER PEOPLE: SOMEWHAT DIFFICULT
8. IF YOU CHECKED OFF ANY PROBLEMS, HOW DIFFICULT HAVE THESE MADE IT FOR YOU TO DO YOUR WORK, TAKE CARE OF THINGS AT HOME, OR GET ALONG WITH OTHER PEOPLE?: SOMEWHAT DIFFICULT
2. NOT BEING ABLE TO STOP OR CONTROL WORRYING: NOT AT ALL
4. TROUBLE RELAXING: SEVERAL DAYS

## 2024-03-19 ASSESSMENT — PATIENT HEALTH QUESTIONNAIRE - PHQ9: SUM OF ALL RESPONSES TO PHQ QUESTIONS 1-9: 10

## 2024-03-19 ASSESSMENT — PAIN SCALES - GENERAL: PAINLEVEL: MODERATE PAIN (4)

## 2024-03-19 NOTE — PROGRESS NOTES
Yo was seen today for pain.    Diagnoses and all orders for this visit:    Class 3 severe obesity without serious comorbidity with body mass index (BMI) of 40.0 to 44.9 in adult, unspecified obesity type (H)  Comments:  Would like meds, declined referral to Wt management clinic. Trial Wellbutrin.  Orders:  -     buPROPion (WELLBUTRIN XL) 150 MG 24 hr tablet; Take 1 tablet (150 mg) by mouth every morning  -RTC in 4 weeks    Chronic bilateral low back pain without sciatica  Chronic, stable.  Denies red flag symptoms.  Patient requested hydrocodone again, discussed with patient that I would not recommend opioid medications for chronic pain.  Patient was agreeable.  Will continue cyclobenzaprine, add Tylenol, and ibuprofen sparingly.  -     cyclobenzaprine (FLEXERIL) 5 MG tablet; Take 1 tablet (5 mg) by mouth 3 times daily as needed for muscle spasms  -     acetaminophen (TYLENOL) 500 MG tablet; Take 1-2 tablets (500-1,000 mg) by mouth every 8 hours as needed for mild pain  -     ibuprofen (ADVIL/MOTRIN) 600 MG tablet; Take 1 tablet (600 mg) by mouth every 6 hours as needed for moderate pain  -Previously referred to PT by pain, patient reports that he would like to wait until after his surgery.    Moderate episode of recurrent major depressive disorder (H)  Chronic, fluctuating course.  Denies SI or HI.  Continue olanzapine, add Wellbutrin.  -     OLANZapine (ZYPREXA) 10 MG tablet; Take 1 tablet (10 mg) by mouth at bedtime  -     buPROPion (WELLBUTRIN XL) 150 MG 24 hr tablet; Take 1 tablet (150 mg) by mouth every morning  -RTC in 1 month    Need for hepatitis B vaccination  Need for COVID-19 vaccine  Needs flu shot  Need for prophylactic vaccination against Streptococcus pneumoniae (pneumococcus)  Comments:  Discussed, pt would like to defer    Varicose veins of right lower extremity, unspecified whether complicated  Comments:  Right thigh, chronic, mild pain sometimes. Will monitor.          Subjective   Yo  is a 44 year old, presenting for the following health issues:  Pain (R foot and leg , back on going for years)        3/19/2024     1:21 PM   Additional Questions   Roomed by Inga     History of Present Illness       Back Pain:  He presents for follow up of back pain. Patient's back pain is a chronic problem.  Location of back pain:  Right middle of back, left side of neck, right side of waist and left side of waist  Description of back pain: dull ache  Back pain spreads: right side of neck    Since patient first noticed back pain, pain is: always present, but gets better and worse  Does back pain interfere with his job:  Yes       Mental Health Follow-up:  Patient presents to follow-up on Depression & Anxiety.Patient's depression since last visit has been:  Medium  The patient is not having other symptoms associated with depression.  Patient's anxiety since last visit has been:  Medium  The patient is not having other symptoms associated with anxiety.  Any significant life events: No and health concerns  Patient is not feeling anxious or having panic attacks.  Patient has no concerns about alcohol or drug use.    Reason for visit:  Follow up for previous and establish care    He eats 0-1 servings of fruits and vegetables daily.He consumes 1 sweetened beverage(s) daily.He exercises with enough effort to increase his heart rate 10 to 19 minutes per day.  He exercises with enough effort to increase his heart rate 5 days per week.   He is taking medications regularly.         Per chart review:  Saw pain management 3/5/2024, recommend to maximize neuropathic muscle relaxants in preoperative period, can can after surgery.  Referred to PT and pool therapy.  Recommend psychology for anxiety and stress.    Podiatry 3/15/2024: Sprain of right ankle, Tri-Lock for right ankle, PT, orthotics    Sleep medicine appointment 5/13/2024    [facility]gery appointment at [facility] 4/3/2024    Didn't see ADHD in records - would like to  "wait on referral to testing.     Back Pain  - reports that Pain offered injection, but declined.   - stable  - Denies red flags, including: fever, history of IV drug use or malignancy, trauma or injury, loss of bowel or bladder control, saddle anesthesia, numbness, weakness, or tingling of lower extremities.  - wants to wait on PT    HTN - WNL now  - clonidine - no longer taking.             Objective    /75 (BP Location: Left arm, Patient Position: Sitting, Cuff Size: Adult Large)   Pulse 94   Temp 97.9  F (36.6  C) (Temporal)   Resp 24   Ht 1.753 m (5' 9\")   Wt 122.9 kg (271 lb)   SpO2 97%   BMI 40.02 kg/m    Body mass index is 40.02 kg/m . Prior to immunization administration, verified patients identity using patient s name and date of birth. Please see Immunization Activity for additional information.     Screening Questionnaire for Adult Immunization    Are you sick today?   No   Do you have allergies to medications, food, a vaccine component or latex?   No   Have you ever had a serious reaction after receiving a vaccination?   No   Do you have a long-term health problem with heart, lung, kidney, or metabolic disease (e.g., diabetes), asthma, a blood disorder, no spleen, complement component deficiency, a cochlear implant, or a spinal fluid leak?  Are you on long-term aspirin therapy?   Yes   Do you have cancer, leukemia, HIV/AIDS, or any other immune system problem?   No   Do you have a parent, brother, or sister with an immune system problem?   No   In the past 3 months, have you taken medications that affect  your immune system, such as prednisone, other steroids, or anticancer drugs; drugs for the treatment of rheumatoid arthritis, Crohn s disease, or psoriasis; or have you had radiation treatments?   No   Have you had a seizure, or a brain or other nervous system problem?   No   During the past year, have you received a transfusion of blood or blood    products, or been given immune (gamma) " globulin or antiviral drug?   No   For women: Are you pregnant or is there a chance you could become       pregnant during the next month?   No   Have you received any vaccinations in the past 4 weeks?   No     Immunization questionnaire was positive for at least one answer.  Notified .          Screening performed by Inga Christina MA on 3/19/2024 at 1:29 PM.       Physical Exam   General: Well developed, well nourished.  Skin:  Dry without rash.    Head:  Normocephalic-atraumatic.    Eye:  Normal conjunctivae.     Respiratory:  Normal respiratory effort.   Gastrointestinal:  Non-distended.    Musculoskeletal:  No deformity or edema.  Neurologic: No focal deficits.          Signed Electronically by: Cornelia Payne MD

## 2024-04-18 ENCOUNTER — VIRTUAL VISIT (OUTPATIENT)
Dept: FAMILY MEDICINE | Facility: CLINIC | Age: 44
End: 2024-04-18
Payer: COMMERCIAL

## 2024-04-18 DIAGNOSIS — Z98.890 S/P REPAIR OF VENTRAL HERNIA: ICD-10-CM

## 2024-04-18 DIAGNOSIS — E66.813 CLASS 3 SEVERE OBESITY WITHOUT SERIOUS COMORBIDITY WITH BODY MASS INDEX (BMI) OF 40.0 TO 44.9 IN ADULT, UNSPECIFIED OBESITY TYPE (H): ICD-10-CM

## 2024-04-18 DIAGNOSIS — E66.01 CLASS 3 SEVERE OBESITY WITHOUT SERIOUS COMORBIDITY WITH BODY MASS INDEX (BMI) OF 40.0 TO 44.9 IN ADULT, UNSPECIFIED OBESITY TYPE (H): ICD-10-CM

## 2024-04-18 DIAGNOSIS — F33.1 MODERATE EPISODE OF RECURRENT MAJOR DEPRESSIVE DISORDER (H): Primary | ICD-10-CM

## 2024-04-18 DIAGNOSIS — G89.29 CHRONIC BILATERAL LOW BACK PAIN WITHOUT SCIATICA: ICD-10-CM

## 2024-04-18 DIAGNOSIS — Z87.19 S/P REPAIR OF VENTRAL HERNIA: ICD-10-CM

## 2024-04-18 DIAGNOSIS — M54.50 CHRONIC BILATERAL LOW BACK PAIN WITHOUT SCIATICA: ICD-10-CM

## 2024-04-18 PROCEDURE — 99214 OFFICE O/P EST MOD 30 MIN: CPT | Mod: 95 | Performed by: STUDENT IN AN ORGANIZED HEALTH CARE EDUCATION/TRAINING PROGRAM

## 2024-04-18 RX ORDER — ESCITALOPRAM OXALATE 20 MG/1
TABLET ORAL
COMMUNITY
Start: 2023-04-26 | End: 2024-04-18

## 2024-04-18 RX ORDER — ALBUTEROL SULFATE 90 UG/1
AEROSOL, METERED RESPIRATORY (INHALATION)
COMMUNITY
Start: 2024-01-03

## 2024-04-18 RX ORDER — BUPROPION HYDROCHLORIDE 300 MG/1
300 TABLET ORAL EVERY MORNING
Qty: 90 TABLET | Refills: 1 | Status: SHIPPED | OUTPATIENT
Start: 2024-04-18

## 2024-04-18 RX ORDER — AZITHROMYCIN 500 MG/1
TABLET, FILM COATED ORAL
COMMUNITY
Start: 2024-01-03 | End: 2024-05-13

## 2024-04-18 RX ORDER — ESCITALOPRAM OXALATE 10 MG/1
TABLET ORAL
COMMUNITY
Start: 2023-07-02 | End: 2024-04-18

## 2024-04-18 NOTE — PROGRESS NOTES
Yo is a 44 year old who is being evaluated via a billable video visit.      Yo was seen today for follow up.    Diagnoses and all orders for this visit:    Moderate episode of recurrent major depressive disorder (H)  Comments:  Chronic, improved, still uncontrolled.  Will increase Wellbutrin to 300 mg daily.  RTC in 1 month.  Orders:  -     buPROPion (WELLBUTRIN XL) 300 MG 24 hr tablet; Take 1 tablet (300 mg) by mouth every morning    Class 3 severe obesity without serious comorbidity with body mass index (BMI) of 40.0 to 44.9 in adult, unspecified obesity type (H)  Comments:  Increase Wellbutrin.  Has appointment with nutrition coming up in June and weight management clinic in August.  Discussed lifestyle modifications.  Orders:  -     buPROPion (WELLBUTRIN XL) 300 MG 24 hr tablet; Take 1 tablet (300 mg) by mouth every morning    S/P repair of ventral hernia  Comments:  Follows with surgeons in [place].  Reports that he needs to do a CT next week to determine surgery.  Would like to lose weight before then.    Chronic bilateral low back pain without sciatica  Comments:  Saw pain management, does not recommend opioid pain medications.  Discussed possibly adding duloxetine at next visit.          Subjective   Yo is a 44 year old, presenting for the following health issues:  Follow Up        4/18/2024     4:55 PM   Additional Questions   Roomed by Korin     Video Start Time: 10:51 AM    History of Present Illness       Reason for visit:  Follow up    He eats 0-1 servings of fruits and vegetables daily.   He is taking medications regularly.             Objective           Vitals:  No vitals were obtained today due to virtual visit.    Physical Exam             Video-Visit Details    Type of service:  Video Visit   Video start time: 5:45 PM  Video End Time: 6 PM  Originating Location (pt. Location): Home    Distant Location (provider location):  On-site  Platform used for Video Visit: Lei Richey  Electronically by: Cornelia Payne MD

## 2024-04-18 NOTE — PROGRESS NOTES
Chronic bilateral low back pain without sciatica  Chronic, stable.  Denies red flag symptoms.  Patient requested hydrocodone again, discussed with patient that I would not recommend opioid medications for chronic pain.  Patient was agreeable.  Will continue cyclobenzaprine, add Tylenol, and ibuprofen sparingly.  -     cyclobenzaprine (FLEXERIL) 5 MG tablet; Take 1 tablet (5 mg) by mouth 3 times daily as needed for muscle spasms  -     acetaminophen (TYLENOL) 500 MG tablet; Take 1-2 tablets (500-1,000 mg) by mouth every 8 hours as needed for mild pain  -     ibuprofen (ADVIL/MOTRIN) 600 MG tablet; Take 1 tablet (600 mg) by mouth every 6 hours as needed for moderate pain  -Previously referred to PT by pain, patient reports that he would like to wait until after his surgery.  Saw Pain agement 3/5/2024, note below:  RECOMMENDATIONS:   1. Medications: Maximize neuropathic and muscle relaxants in the perioperative period. D/w the patient that rehabilitation with active physical therapy for abdominal pain and back pain. The patient is persistent about opioid type of medications. We discussed that the primary problem of ventral hernia needs to be addressed and medications are unlikely to significantly reverse the visceral discomfort from ventral hernia.   2. Procedure: The patient is not interested in interventional therapies at this time. At this time he has the pressing issue of ventral hernia repair.  Recommend he pursues conservative measures for pain control. The patient can return after surgery recovery to explore interventional options.    3. Physical therapy: I have refered the patient for outpatient physical therapy for stretching, strengthening and home exercise program for chronic low back pain. The patient will also discuss spine care and posture. Pool therapy and stretches can be considered if available.  4. Pool therapy referral.   5. He discussed that he would be interested in weight management after the  ventral hernia  6. He may benefit from ongoing psychology support for anxiety and stress.   - reports feet, back and over body hurts at the end of the day.   - hernia surgery not scheduled yet. CT scan next week.   - Nutrition appt 5/13/24        3/19/2024     1:59 PM   PHQ   PHQ-9 Total Score 10   Q9: Thoughts of better off dead/self-harm past 2 weeks Not at all         3/5/2024     9:25 AM 3/19/2024     1:10 PM   GARDENIA-7 SCORE   Total Score 7 (mild anxiety) 4 (minimal anxiety)   Total Score 7 4

## 2024-05-13 ENCOUNTER — OFFICE VISIT (OUTPATIENT)
Dept: SLEEP MEDICINE | Facility: CLINIC | Age: 44
End: 2024-05-13
Attending: STUDENT IN AN ORGANIZED HEALTH CARE EDUCATION/TRAINING PROGRAM
Payer: COMMERCIAL

## 2024-05-13 VITALS
RESPIRATION RATE: 18 BRPM | WEIGHT: 282 LBS | BODY MASS INDEX: 41.77 KG/M2 | DIASTOLIC BLOOD PRESSURE: 83 MMHG | SYSTOLIC BLOOD PRESSURE: 124 MMHG | HEART RATE: 79 BPM | OXYGEN SATURATION: 97 % | HEIGHT: 69 IN

## 2024-05-13 DIAGNOSIS — R53.83 OTHER FATIGUE: ICD-10-CM

## 2024-05-13 DIAGNOSIS — E66.01 MORBID OBESITY WITH BMI OF 40.0-44.9, ADULT (H): ICD-10-CM

## 2024-05-13 DIAGNOSIS — G47.9 SLEEP DISTURBANCE: Primary | ICD-10-CM

## 2024-05-13 DIAGNOSIS — G47.10 EXCESSIVE SLEEPINESS: ICD-10-CM

## 2024-05-13 DIAGNOSIS — R35.1 NOCTURIA: ICD-10-CM

## 2024-05-13 DIAGNOSIS — R06.83 SNORING: ICD-10-CM

## 2024-05-13 DIAGNOSIS — G47.00 INSOMNIA, UNSPECIFIED TYPE: ICD-10-CM

## 2024-05-13 PROCEDURE — 99204 OFFICE O/P NEW MOD 45 MIN: CPT | Performed by: NURSE PRACTITIONER

## 2024-05-13 ASSESSMENT — SLEEP AND FATIGUE QUESTIONNAIRES
HOW LIKELY ARE YOU TO NOD OFF OR FALL ASLEEP WHEN YOU ARE A PASSENGER IN A CAR FOR AN HOUR WITHOUT A BREAK: SLIGHT CHANCE OF DOZING
HOW LIKELY ARE YOU TO NOD OFF OR FALL ASLEEP WHILE SITTING AND READING: MODERATE CHANCE OF DOZING
HOW LIKELY ARE YOU TO NOD OFF OR FALL ASLEEP WHILE SITTING AND TALKING TO SOMEONE: WOULD NEVER DOZE
HOW LIKELY ARE YOU TO NOD OFF OR FALL ASLEEP WHILE SITTING QUIETLY AFTER LUNCH WITHOUT ALCOHOL: WOULD NEVER DOZE
HOW LIKELY ARE YOU TO NOD OFF OR FALL ASLEEP WHILE SITTING INACTIVE IN A PUBLIC PLACE: WOULD NEVER DOZE
HOW LIKELY ARE YOU TO NOD OFF OR FALL ASLEEP WHILE LYING DOWN TO REST IN THE AFTERNOON WHEN CIRCUMSTANCES PERMIT: HIGH CHANCE OF DOZING
HOW LIKELY ARE YOU TO NOD OFF OR FALL ASLEEP WHILE WATCHING TV: MODERATE CHANCE OF DOZING
HOW LIKELY ARE YOU TO NOD OFF OR FALL ASLEEP IN A CAR, WHILE STOPPED FOR A FEW MINUTES IN TRAFFIC: WOULD NEVER DOZE

## 2024-05-13 NOTE — PATIENT INSTRUCTIONS
"          MY TREATMENT INFORMATION FOR SLEEP APNEA-  [place]    DOCTOR : ANA Cleaning CNP    Am I having a sleep study at a sleep center?  --->Due to normal delays, you will be contacted within 2-4 weeks to schedule    Am I having a home sleep study?  --->Watch the video for the device you are using:    -/drop off device-   https://www.GBS.com/watch?v=yGGFBdELGhk    -Disposable device sent out require phone/computer application-   https://www.GBS.com/watch?v=BCce_vbiwxE      Frequently asked questions:  1. What is Obstructive Sleep Apnea (ABDOUL)? ABDOUL is the most common type of sleep apnea. Apnea means, \"without breath.\"  Apnea is most often caused by narrowing or collapse of the upper airway as muscles relax during sleep.   Almost everyone has occasional apneas. Most people with sleep apnea have had brief interruptions at night frequently for many years.  The severity of sleep apnea is related to how frequent and severe the events are.   2. What are the consequences of ABDOUL? Symptoms include: feeling sleepy during the day, snoring loudly, gasping or stopping of breathing, trouble sleeping, and occasionally morning headaches or heartburn at night.  Sleepiness can be serious and even increase the risk of falling asleep while driving. Other health consequences may include development of high blood pressure and other cardiovascular disease in persons who are susceptible. Untreated ABDOUL  can contribute to heart disease, stroke and diabetes.   3. What are the treatment options? In most situations, sleep apnea is a lifelong disease that must be managed with daily therapy. Medications are not effective for sleep apnea and surgery is generally not considered until other therapies have been tried. Your treatment is your choice . Continuous Positive Airway (CPAP) works right away and is the therapy that is effective in nearly everyone. An oral device to hold your jaw forward is usually the next most " reliable option. Other options include postioning devices (to keep you off your back), weight loss, and surgery including a tongue pacing device. There is more detail about some of these options below.  4. Are my sleep studies covered by insurance? Although we will request verification of coverage, we advise you also check in advance of the study to ensure there is coverage.    Important tips for those choosing CPAP and similar devices  REMEMBER-IF YOU RECEIVE A CALL FROM  [phone]-->IT IS TO SETUP A DEVICE  For new devices, sign up for device MELISSA to monitor your device for your followup visits  We encourage you to utilize the [employer] meilssa or website ( https://[employer]/ ) to monitor your therapy progress and share the data with your healthcare team when you discuss your sleep apnea.                                                    Know your equipment:  CPAP is continuous positive airway pressure that prevents obstructive sleep apnea by keeping the throat from collapsing while you are sleeping. In most cases, the device is  smart  and can slowly self-adjusts if your throat collapses and keeps a record every day of how well you are treated-this information is available to you and your care team.  BPAP is bilevel positive airway pressure that keeps your throat open and also assists each breath with a pressure boost to maintain adequate breathing.  Special kinds of BPAP are used in patients who have inadequate breathing from lung or heart disease. In most cases, the device is  smart  and can slowly self-adjusts to assist breathing. Like CPAP, the device keeps a record of how well you are treated.  Your mask is your connection to the device. You get to choose what feels most comfortable and the staff will help to make sure if fits. Here: are some examples of the different masks that are available: Magnetic mask aids may assist with use but there are safety issues that should be addressed when  considering with magnets* ( see end of discussion).       Key points to remember on your journey with sleep apnea:  Sleep study.  PAP devices often need to be adjusted during a sleep study to show that they are effective and adjusted right.  Good tips to remember: Try wearing just the mask during a quiet time during the day so your body adapts to wearing it. A humidifier is recommended for comfort in most cases to prevent drying of your nose and throat. Allergy medication from your provider may help you if you are having nasal congestion.  Getting settled-in. It takes more than one night for most of us to get used to wearing a mask. Try wearing just the mask during a quiet time during the day so your body adapts to wearing it. A humidifier is recommended for comfort in most cases. Our team will work with you carefully on the first day and will be in contact within 4 days and again at 2 and 4 weeks for advice and remote device adjustments. Your therapy is evaluated by the device each day.   Use it every night. The more you are able to sleep naturally for 7-8 hours, the more likely you will have good sleep and to prevent health risks or symptoms from sleep apnea. Even if you use it 4 hours it helps. Occasionally all of us are unable to use a medical therapy, in sleep apnea, it is not dangerous to miss one night.   Communicate. Call our skilled team on the number provided on the first day if your visit for problems that make it difficult to wear the device. Over 2 out of 3 patients can learn to wear the device long-term with help from our team. Remember to call our team or your sleep providers if you are unable to wear the device as we may have other solutions for those who cannot adapt to mask CPAP therapy. It is recommended that you sleep your sleep provider within the first 3 months and yearly after that if you are not having problems.   Use it for your health. We encourage use of CPAP masks during daytime quiet  periods to allow your face and brain to adapt to the sensation of CPAP so that it will be a more natural sensation to awaken to at night or during naps. This can be very useful during the first few weeks or months of adapting to CPAP though it does not help medically to wear CPAP during wakefulness and  should not be used as a strategy just to meet guidelines.  Take care of your equipment. Make sure you clean your mask and tubing using directions every day and that your filter and mask are replaced as recommended or if they are not working.     *Masks with magnets:  Updated Contraindications  Masks with magnetic components are contraindicated for use by patients where they, or anyone in close physical contact while using the mask, have the following:   Active medical implants that interact with magnets (i.e., pacemakers, implantable cardioverter defibrillators (ICD), neurostimulators, cerebrospinal fluid (CSF) shunts, insulin/infusion pumps)   Metallic implants/objects containing ferromagnetic material (i.e., aneurysm clips/flow disruption devices, embolic coils, stents, valves, electrodes, implants to restore hearing or balance with implanted magnets, ocular implants, metallic splinters in the eye)  Updated Warning  Keep the mask magnets at a safe distance of at least 6 inches (150 mm) away from implants or medical devices that may be adversely affected by magnetic interference. This warning applies to you or anyone in close physical contact with your mask. The magnets are in the frame and lower headgear clips, with a magnetic field strength of up to 400mT. When worn, they connect to secure the mask but may inadvertently detach while asleep.  Implants/medical devices, including those listed within contraindications, may be adversely affected if they change function under external magnetic fields or contain ferromagnetic materials that attract/repel to magnetic fields (some metallic implants, e.g., contact lenses  with metal, dental implants, metallic cranial plates, screws, vidhi hole covers, and bone substitute devices). Consult your physician and  of your implant / other medical device for information on the potential adverse effects of magnetic fields.    BESIDES CPAP, WHAT OTHER THERAPIES ARE THERE?    Positioning Device  Positioning devices are generally used when sleep apnea is mild and only occurs on your back.This example shows a pillow that straps around the waist. It may be appropriate for those whose sleep study shows milder sleep apnea that occurs primarily when lying flat on one's back. Preliminary studies have shown benefit but effectiveness at home may need to be verified by a home sleep test. These devices are generally not covered by medical insurance.  Examples of devices that maintain sleeping on the back to prevent snoring and mild sleep apnea.    Belt type body positioner  http://[employer]/    Electronic reminder  http://nightshifttherapy.[employer]/            Oral Appliance  What is oral appliance therapy?  An oral appliance device fits on your teeth at night like a retainer used after having braces. The device is made by a specialized dentist and requires several visits over 1-2 months before a manufactured device is made to fit your teeth and is adjusted to prevent your sleep apnea. Once an oral device is working properly, snoring should be improved. A home sleep test may be recommended at that time if to determine whether the sleep apnea is adequately treated.       Some things to remember:  -Oral devices are often, but not always, covered by your medical insurance. Be sure to check with your insurance provider.   -If you are referred for oral therapy, you will be given a list of specialized dentists to consider or you may choose to visit the Web site of the American Academy of Dental Sleep Medicine  -Oral devices are less likely to work if you have severe sleep apnea or are extremely  overweight.     More detailed information  An oral appliance is a small acrylic device that fits over the upper and lower teeth  (similar to a retainer or a mouth guard). This device slightly moves jaw forward, which moves the base of the tongue forward, opens the airway, improves breathing for effective treat snoring and obstructive sleep apnea in perhaps 7 out of 10 people .  The best working devices are custom-made by a dental device  after a mold is made of the teeth 1, 2, 3.  When is an oral appliance indicated?  Oral appliance therapy is recommended as a first-line treatment for patients with primary snoring, mild sleep apnea, and for patients with moderate sleep apnea who prefer appliance therapy to use of CPAP4, 5. Severity of sleep apnea is determined by sleep testing and is based on the number of respiratory events per hour of sleep.   How successful is oral appliance therapy?  The success rate of oral appliance therapy in patients with mild sleep apnea is 75-80% while in patients with moderate sleep apnea it is 50-70%. The chance of success in patients with severe sleep apnea is 40-50%. The research also shows that oral appliances have a beneficial effect on the cardiovascular health of ABDOUL patients at the same magnitude as CPAP therapy7.  Oral appliances should be a second-line treatment in cases of severe sleep apnea, but if not completely successful then a combination therapy utilizing CPAP plus oral appliance therapy may be effective. Oral appliances tend to be effective in a broad range of patients although studies show that the patients who have the highest success are females, younger patients, those with milder disease, and less severe obesity. 3, 6.   Finding a dentist that practices dental sleep medicine  Specific training is available through the American Academy of Dental Sleep Medicine for dentists interested in working in the field of sleep. To find a dentist who is educated in  the field of sleep and the use of oral appliances, near you, visit the Web site of the American Academy of Dental Sleep Medicine.    References  1. Mando, et al. Objectively measured vs self-reported compliance during oral appliance therapy for sleep-disordered breathing. Chest 2013; 144(5): 1766-9738.  2. Mynor et al. Objective measurement of compliance during oral appliance therapy for sleep-disordered breathing. Thorax 2013; 68(1): 91-96.  3. Sharmila et al. Mandibular advancement devices in 620 men and women with ABDOUL and snoring: tolerability and predictors of treatment success. Chest 2004; 125: 5621-7824.  4. Kp, et al. Oral appliances for snoring and ABDOUL: a review. Sleep 2006; 29: 244-262.  5. Agustina et al. Oral appliance treatment for ABDOUL: an update. J Clin Sleep Med 2014; 10(2): 215-227.  6. Bronson et al. Predictors of OSAH treatment outcome. J Dent Res 2007; 86: 7700-2339.      Weight Loss:   Your Body mass index is 41.64 kg/m .    Being overweight does not necessarily mean you will have health consequences.  Those who have BMI over 35 or over 27 with existing medical conditions carries greater risk.   Weight loss decreases severity of sleep apnea in most people with obesity. For those with mild obesity who have developed snoring with weight gain, even 15-30 pound weight loss can improve and occasionally milder eliminate sleep apnea.  Structured and life-long dietary and health habits are necessary to lose weight and keep healthier weight levels.     The Comprehensive Weight loss program offers all aspects of weight loss strategies including two Non-Surgical Weight Loss Programs: Medical Weight Management and our 24 Week Healthy Lifestyle Program:    Medical Weight Management: You will meet with a Medical Weight Management Provider, as well as a Registered Dietician. The program may include medication therapy, dietary education, recommended exercise and physical therapy programs,  monthly support group meetings, and possible psychological counseling. Follow up visits with the provider or dietician are scheduled based on your progress and needs.    24 Week Healthy Lifestyle Program: This unique program is designed to give you the support of weekly appointments and activities thru a 24-week period. It may include all of the components of the basic program (above), with the addition of 11 individual Health  Visits, 24-week access to the [employer] website for over 700 online classes, and monthly support group meetings. This program has an out-of-pocket expense of $499 to cover the items that can not be billed to insurance (health coaches and [facility] access), and is non-refundable/non-transferable (you may be able to use a Health Savings Account; ask your HSA provider). There may be an optional meal replacement plan prescribed as well.   Surgical management achieves meaningful long-term weight loss and improvement in health risks in most patients with more severe obesity.      Sleep Apnea Surgery:    Surgery for obstructive sleep apnea is considered generally only when other therapies fail to work. Surgery may be discussed with you if you are having a difficult time tolerating CPAP and or when there is an abnormal structure that requires surgical correction.  Nose and throat surgeries often enlarge the airway to prevent collapse.  Most of these surgeries create pain for 1-2 weeks and up to half of the most common surgeries are not effective throughout life.  You should carefully discuss the benefits and drawbacks to surgery with your sleep provider and surgeon to determine if it is the best solution for you.   More information  Surgery for ABDOUL is directed at areas that are responsible for narrowing or complete obstruction of the airway during sleep.  There are a wide range of procedures available to enlarge and/or stabilize the airway to prevent blockage of breathing in the three major  areas where it can occur: the palate, tongue, and nasal regions.  Successful surgical treatment depends on the accurate identification of the factors responsible for obstructive sleep apnea in each person.  A personalized approach is required because there is no single treatment that works well for everyone.  Because of anatomic variation, consultation with an examination by a sleep surgeon is a critical first step in determining what surgical options are best for each patient.  In some cases, examination during sedation may be recommended in order to guide the selection of procedures.  Patients will be counseled about risks and benefits as well as the typical recovery course after surgery. Surgery is typically not a cure for a person s ABDOUL.  However, surgery will often significantly improve one s ABDOUL severity (termed  success rate ).  Even in the absence of a cure, surgery will decrease the cardiovascular risk associated with OSA7; improve overall quality of life8 (sleepiness, functionality, sleep quality, etc).      Palate Procedures:  Patients with ABDOUL often have narrowing of their airway in the region of their tonsils and uvula.  The goals of palate procedures are to widen the airway in this region as well as to help the tissues resist collapse.  Modern palate procedure techniques focus on tissue conservation and soft tissue rearrangement, rather than tissue removal.  Often the uvula is preserved in this procedure. Residual sleep apnea is common in patient after pharyngoplasty with an average reduction in sleep apnea events of 33%2.      Tongue Procedures:  ExamWhile patients are awake, the muscles that surround the throat are active and keep this region open for breathing. These muscles relax during sleep, allowing the tongue and other structures to collapse and block breathing.  There are several different tongue procedures available.  Selection of a tongue base procedure depends on characteristics seen on  physical exam.  Generally, procedures are aimed at removing bulky tissues in this area or preventing the back of the tongue from falling back during sleep.  Success rates for tongue surgery range from 50-62%3.    Hypoglossal Nerve Stimulation:  Hypoglossal nerve stimulation has recently received approval from the [place] Food and Drug Administration for the treatment of obstructive sleep apnea.  This is based on research showing that the system was safe and effective in treating sleep apnea6.  Results showed that the median AHI score decreased 68%, from 29.3 to 9.0. This therapy uses an implant system that senses breathing patterns and delivers mild stimulation to airway muscles, which keeps the airway open during sleep.  The system consists of three fully implanted components: a small generator (similar in size to a pacemaker), a breathing sensor, and a stimulation lead.  Using a small handheld remote, a patient turns the therapy on before bed and off upon awakening.    Candidates for this device must be greater than 18 years of age, have moderate to severe obstructive sleep apnea with less than 25% central events  (AHI between 15-65), BMI less than 35, have tried CPAP/oral appliance for at least 8 weeks without success, and have appropriate upper airway anatomy (determined by a sleep endoscopy performed by Dr. Rafael Gaxiola or Dr. Nicolas Byrne).    Nasal Procedures:  Nasal obstruction can interfere with nasal breathing during the day and night.  Studies have shown that relief of nasal obstruction can improve the ability of some patients to tolerate positive airway pressure therapy for obstructive sleep apnea1.  Treatment options include medications such as nasal saline, topical corticosteroid and antihistamine sprays, and oral medications such as antihistamines or decongestants. Non-surgical treatments can include external nasal dilators for selected patients. If these are not successful by themselves,  surgery can improve the nasal airway either alone or in combination with these other options.        Combination Procedures:  Combination of surgical procedures and other treatments may be recommended, particularly if patients have more than one area of narrowing or persistent positional disease.  The success rate of combination surgery ranges from 66-80%2,3.    References  Akshat HODGES. The Role of the Nose in Snoring and Obstructive Sleep Apnoea: An Update.  Eur Arch Otorhinolaryngol. 2011; 268: 1365-73.   Juarez SM; Miriam JA; Kristin JR; Pallanch JF; Ayde MB; Nery SG; Jaskaran FOSTER. Surgical modifications of the upper airway for obstructive sleep apnea in adults: a systematic review and meta-analysis. SLEEP 2010;33(10):0618-3139. Sabino OLMSTEAD. Hypopharyngeal surgery in obstructive sleep apnea: an evidence-based medicine review.  Arch Otolaryngol Head Neck Surg. 2006 Feb;132(2):206-13.  Pal YH1, Annie Y, Jakob SANCHEZ. The efficacy of anatomically based multilevel surgery for obstructive sleep apnea. Otolaryngol Head Neck Surg. 2003 Oct;129(4):327-35.  Sabino OLMSTEAD, Goldberg A. Hypopharyngeal Surgery in Obstructive Sleep Apnea: An Evidence-Based Medicine Review. Arch Otolaryngol Head Neck Surg. 2006 Feb;132(2):206-13.  Tera RIVERA et al. Upper-Airway Stimulation for Obstructive Sleep Apnea.  N Engl J Med. 2014 Jan 9;370(2):139-49.  Karla Y et al. Increased Incidence of Cardiovascular Disease in Middle-aged Men with Obstructive Sleep Apnea. Am J Respir Crit Care Med; 2002 166: 159-165  Bonilla EM et al. Studying Life Effects and Effectiveness of Palatopharyngoplasty (SLEEP) study: Subjective Outcomes of Isolated Uvulopalatopharyngoplasty. Otolaryngol Head Neck Surg. 2011; 144: 623-631.        WHAT IF I ONLY HAVE SNORING?    Mandibular advancement devices, lateral sleep positioning, long-term weight loss and treatment of nasal allergies have been shown to improve snoring.  Exercising tongue muscles with a game  (https://[employer]/us/melissa/soundly-reduce-snoring/in4251457138) or stimulating the tongue during the day with a device (https://doi.org/10.3390/zmk66214989) have improved snoring in some individuals.  https://www.[employer]/  https://www.sleepfoundation.org/best-anti-snoring-mouthpieces-and-mouthguards    Remember to Drive Safe... Drive Alive     Sleep health profoundly affects your health, mood, and your safety.  Thirty three percent of the population (one in three of us) is not getting enough sleep and many have a sleep disorder. Not getting enough sleep or having an untreated / undertreated sleep condition may make us sleepy without even knowing it. In fact, our driving could be dramatically impaired due to our sleep health. As your provider, here are some things I would like you to know about driving:     Here are some warning signs for impairment and dangerous drowsy driving:              -Having been awake more than 16 hours               -Looking tired               -Eyelid drooping              -Head nodding (it could be too late at this point)              -Driving for more than 30 minutes     Some things you could do to make the driving safer if you are experiencing some drowsiness:              -Stop driving and rest              -Call for transportation              -Make sure your sleep disorder is adequately treated     Some things that have been shown NOT to work when experiencing drowsiness while driving:              -Turning on the radio              -Opening windows              -Eating any  distracting  /  entertaining  foods (e.g., sunflower seeds, candy, or any other)              -Talking on the phone      Your decision may not only impact your life, but also the life of others. Please, remember to drive safe for yourself and all of us.

## 2024-05-13 NOTE — PROGRESS NOTES
Outpatient Sleep Medicine Consultation:      Name: Yo East MRN# [id number]   Age: 44 year old YOB: 1980     Date of Consultation: May 13, 2024  Consultation is requested by: Cornelia Payne MD  [address] [place] [address] Cornelia Payne  Primary care provider: Cornelia Payne       Reason for Sleep Consult:     Yo East is sent by Cornelia Payne for a sleep consultation regarding snoring, fatigue.    Patient s Reason for visit  Yo East main reason for visit: fatigue, feels like not enough sleep  Patient states problem(s) started: 18 months ago  Yo East's goals for this visit: utilize the referal and better my sleep quality           Assessment and Plan:     Summary Sleep Diagnoses:  1. Sleep disturbance  2. Snoring  3. Excessive sleepiness  4. Other fatigue  5. Insomnia, unspecified type  6. Nocturia  7. Morbid obesity with BMI of 40.0-44.9, adult (H)  - Adult Sleep Eval & Management  Referral  - Comprehensive Sleep Study; Future      Comorbid Diagnoses:  1.  History of HTN  2.  Asthma  3.  Major depression  4.  ADHD  5.  PTSD  6.  Chronic bilateral low back pain  7.  Recurrent ventral incisional hernia      Summary Recommendations:  1.  Recommend further evaluation with diagnostic in-lab split-night polysomnography (PSG) for possible sleep disordered breathing.  He has a high pretest probability of ABDOUL with symptoms of snoring, daytime somnolence, difficulty staying asleep, heartburn/reflux at night, getting up to urinate more than once, weight gain of approximately 50 pounds over the last year, and nonrestorative sleep for the last 2 years.  His STOP-BANG score is 4-5 today which suggest a high risk of ABDOUL.  His symptoms are consistent with probable obstructive sleep apnea.  2.  We discussed the pathophysiology of obstructive sleep apnea and the risks associated with untreated ABDOUL.  We also discussed the pros and cons of in lab polysomnography versus home sleep testing.  The  patient has elected to undergo in lab polysomnography (PSG) to further evaluate his symptoms of possible obstructive sleep apnea.  3.  All potential therapeutic options including positive airway pressure, mandibular advancing oral appliances, positional therapy, and surgical options were discussed. Also counseled about impact of weight loss on ABDOUL.   4.  His insomnia severity index is 12 today which is consistent with mild clinical insomnia.  This appears to be multifactorial and is associated with elements of poor sleep hygiene, chronic low back pain, heartburn/reflux at night associated with large ventral hernia, and possible sleep disordered breathing contributing.  Information was provided in the AVS for sleep hygiene guidelines review.  Patient to take his usual dose of olanzapine on the night of the sleep study.  He was instructed to bring this with him on the night of the sleep study.  5.  Follow-up in approximately 2 weeks after the sleep study to review the results and to determine next steps.    Orders Placed This Encounter   Procedures    Comprehensive Sleep Study         Summary Counseling:    Sleep Testing Reviewed  Obstructive Sleep Apnea Reviewed  Complications of Untreated Sleep Apnea Reviewed  Previous recent chart notes, lab, imaging results reviewed    Medical Decision-making:   Educational materials provided in instructions    Total time spent reviewing medical records, history and physical examination, review of previous testing and interpretation as well as documentation on this date: 51 minutes    CC: oCrnelia Payne          History of Present Illness:     Yo East is a 44-year-old male with a PMH pertinent for HTN, HLD, asthma, major depression, ADHD, PTSD, chronic bilateral low back pain, recurrent ventral incisional hernia, and morbid obesity who presents today with symptoms of snoring, difficulty staying asleep, daytime somnolence, heartburn/reflux at night, getting up to urinate more than  once, weight gain of 50 lbs over last year, and non-restorative sleep for the last 2 years.  He was referred by his primary care provider for further evaluation of possible sleep disordered breathing.    Past Sleep Evaluations: No    SLEEP-WAKE SCHEDULE:     Work/School Days: Patient goes to school/work: Yes   Usually gets into bed at 10:30 pm  Takes patient about usually a couple minutes or less to fall asleep  Has trouble falling asleep not often when tired nights per week  Wakes up in the middle of the night 3 to 4 times.  Wakes up due to Use the bathroom;Uncertain  He has trouble falling back asleep   times a week.   It usually takes   to get back to sleep  Patient is usually up at 8:00am  Uses alarm: Yes    Weekends/Non-work Days/All Other Days:  Usually gets into bed at 1:00am   Takes patient about few minutes sometimes less or more to fall asleep  Patient is usually up at 8:00am  Uses alarm: No    Sleep Need  Patient gets  8hours sleep on average   Patient thinks he needs about 10hours sleep    Yo CLAYTON Cruzito prefers to sleep in this position(s): Side;Stomach   Patient states they do the following activities in bed: Read;Use phone, computer, or tablet    Naps  Patient takes a purposeful nap rarely few times in the last year times a week and naps are usually 1hour in duration  He feels better after a nap: Yes  He dozes off unintentionally 3 days per week  Patient has had a driving accident or near-miss due to sleepiness/drowsiness: No      SLEEP DISRUPTIONS:    Breathing/Snoring  Patient snores:Yes  Other people complain about his snoring: No  Patient has been told he stops breathing in his sleep:No  He has issues with the following: Morning mouth dryness;Heartburn or reflux at night;Getting up to urinate more than once    Movement:  Patient gets pain, discomfort, with an urge to move:  Yes - r/t hernia  It happens when he is resting:  Yes  It happens more at night:  No  Patient has been told he kicks his legs  at night:  No     Behaviors in Sleep:  Yo East has experienced the following behaviors while sleeping: Recurring Nightmares;Waking up paralyzed  He has experienced sudden muscle weakness during the day:        Is there anything else you would like your sleep provider to know:        CAFFEINE AND OTHER SUBSTANCES:    Patient consumes caffeinated beverages per day:  3  Last caffeine use is usually: 4:00pm  List of any prescribed or over the counter stimulants that patient takes:    List of any prescribed or over the counter sleep medication patient takes:    List of previous sleep medications that patient has tried:    Patient drinks alcohol to help them sleep: No  Patient drinks alcohol near bedtime: No    Alcohol use: None  Nicotine/tobacco use: Vapes - in process of quitting  Recreational drug use: None      Family History:  Patient has a family member been diagnosed with a sleep disorder: No            SCALES:    EPWORTH SLEEPINESS SCALE         5/13/2024    10:46 AM    [place] Sleepiness Scale ( JEROME Jay  [id number]<br>ESS - [place]/English - Final version - 21 Nov 07 - [facility] [place].)   Sitting and reading Moderate chance of dozing   Watching TV Moderate chance of dozing   Sitting, inactive in a public place (e.g. a theatre or a meeting) Would never doze   As a passenger in a car for an hour without a break Slight chance of dozing   Lying down to rest in the afternoon when circumstances permit High chance of dozing   Sitting and talking to someone Would never doze   Sitting quietly after a lunch without alcohol Would never doze   In a car, while stopped for a few minutes in traffic Would never doze   [place] Score (MC) 8   [place] Score (Sleep) 8         INSOMNIA SEVERITY INDEX (PATRICIO)          5/13/2024    10:30 AM   Insomnia Severity Index (PATRICIO)   Difficulty falling asleep 1   Difficulty staying asleep 2   Problems waking up too early 1   How SATISFIED/DISSATISFIED are you with your CURRENT sleep  pattern? 3   How NOTICEABLE to others do you think your sleep problem is in terms of impairing the quality of your life? 2   How WORRIED/DISTRESSED are you about your current sleep problem? 1   To what extent do you consider your sleep problem to INTERFERE with your daily functioning (e.g. daytime fatigue, mood, ability to function at work/daily chores, concentration, memory, mood, etc.) CURRENTLY? 2   PATRICIO Total Score 12       Guidelines for Scoring/Interpretation:  Total score categories:  0-7 = No clinically significant insomnia   8-14 = Subthreshold insomnia   15-21 = Clinical insomnia (moderate severity)  22-28 = Clinical insomnia (severe)  Used via courtesy of www.Transgenomic.va.gov with permission from Nick Yarbrough PhD., [facility]      STOP BANG score: 4-5        5/13/2024    10:47 AM   STOP BANG Questionnaire (  2008, the American Society of Anesthesiologists, Inc. [employer]   1. Snoring - Do you snore loudly (louder than talking or loud enough to be heard through closed doors)? No   2. Tired - Do you often feel tired, fatigued, or sleepy during daytime? Yes   3. Observed - Has anyone observed you stop breathing during your sleep? No   4. Blood pressure - Do you have or are you being treated for high blood pressure? No   5. BMI - BMI more than 35 kg/m2? No   6. Age - Age over 50 yr old? No   7. Neck circumference - Neck circumference greater than 40 cm? No   8. Gender - Gender male? Yes   STOP BANG Score (MC): 1 (Low risk of ABDOUL)         GAD7        3/19/2024     1:10 PM   GARDENIA-7    1. Feeling nervous, anxious, or on edge 1   2. Not being able to stop or control worrying 0   3. Worrying too much about different things 1   4. Trouble relaxing 1   5. Being so restless that it is hard to sit still 0   6. Becoming easily annoyed or irritable 0   7. Feeling afraid, as if something awful might happen 1   GARDENIA-7 Total Score 4   If you checked any problems, how difficult have they  "made it for you to do your work, take care of things at home, or get along with other people? Somewhat difficult         CAGE-AID         No data to display                CAGE-AID reprinted with permission from the [facility] Journal, RYAN East. and DES Hardin, \"Conjoint screening questionnaires for alcohol and drug abuse\" [place] Medical Journal 94: 135-140, 1995.      PATIENT HEALTH QUESTIONNAIRE-9 (PHQ - 9)        3/19/2024     1:59 PM   PHQ-9 ([employer])   1.  Little interest or pleasure in doing things 0   2.  Feeling down, depressed, or hopeless 3   3.  Trouble falling or staying asleep, or sleeping too much 0   4.  Feeling tired or having little energy 3   5.  Poor appetite or overeating 2   6.  Feeling bad about yourself - or that you are a failure or have let yourself or your family down 0   7.  Trouble concentrating on things, such as reading the newspaper or watching television 1   8.  Moving or speaking so slowly that other people could have noticed. Or the opposite - being so fidgety or restless that you have been moving around a lot more than usual 1   9.  Thoughts that you would be better off dead, or of hurting yourself in some way 0   PHQ-9 Total Score 10   If you checked off any problems, how difficult have these problems made it for you to do your work, take care of things at home, or get along with other people? Somewhat difficult   6.  Feeling bad about yourself 0   7.  Trouble concentrating 1   8.  Moving slowly or restless 1   9.  Suicidal or self-harm thoughts 0   Difficulty at work, home, or with people Somewhat difficult       Developed by Keren Arceo, Katlin Sotelo, Terrell Garcia and colleagues, with an educational hardik from [employer] No permission required to reproduce, translate, display or distribute.        Allergies:    Allergies   Allergen Reactions    Amoxicillin Hives    Tetracyclines & Related Hives       Medications:    Current Outpatient Medications "   Medication Sig Dispense Refill    acetaminophen (TYLENOL) 500 MG tablet Take 1-2 tablets (500-1,000 mg) by mouth every 8 hours as needed for mild pain 90 tablet 3    buPROPion (WELLBUTRIN XL) 300 MG 24 hr tablet Take 1 tablet (300 mg) by mouth every morning 90 tablet 1    cyclobenzaprine (FLEXERIL) 5 MG tablet Take 1 tablet (5 mg) by mouth 3 times daily as needed for muscle spasms 30 tablet 3    ibuprofen (ADVIL/MOTRIN) 600 MG tablet Take 1 tablet (600 mg) by mouth every 6 hours as needed for moderate pain 30 tablet 1    levalbuterol (XOPENEX HFA) 45 mcg/actuation inhaler [LEVALBUTEROL (XOPENEX HFA) 45 MCG/ACTUATION INHALER] Inhale 1-2 puffs every 4 (four) hours as needed for wheezing.      OLANZapine (ZYPREXA) 10 MG tablet Take 1 tablet (10 mg) by mouth at bedtime 90 tablet 1    VENTOLIN  (90 Base) MCG/ACT inhaler          Problem List:  Patient Active Problem List    Diagnosis Date Noted    Varicose veins of right lower extremity, unspecified whether complicated 03/19/2024     Priority: Medium     Right thigh, chronic, mild pain sometimes. Will monitor.      Class 3 severe obesity without serious comorbidity with body mass index (BMI) of 40.0 to 44.9 in adult, unspecified obesity type (H) 02/12/2024     Priority: Medium    Ventral hernia without obstruction or gangrene 02/12/2024     Priority: Medium    History of ADHD 02/12/2024     Priority: Medium     Dx as a kid. Will await records.      History of depression 02/12/2024     Priority: Medium    Excessive sleepiness 02/12/2024     Priority: Medium    Chronic bilateral low back pain without sciatica 02/12/2024     Priority: Medium    Hypertriglyceridemia 02/12/2024     Priority: Medium    Recurrent ventral incisional hernia 06/09/2023     Priority: Medium    Carpal tunnel syndrome of left wrist 07/04/2021     Priority: Medium    HTN (hypertension) 07/04/2021     Priority: Medium    PTSD (post-traumatic stress disorder) 07/04/2021     Priority: Medium     S/P repair of ventral hernia 2020     Priority: Medium    Mild intermittent asthma without complication 2020     Priority: Medium        Past Medical/Surgical History:  Past Medical History:   Diagnosis Date    Asthma      Past Surgical History:   Procedure Laterality Date    COLON SURGERY      COLON SURGERY N/A     HERNIORRHAPHY VENTRAL N/A 2020    Procedure: REPAIR RECURRENT VENTRAL HERNIA;  Surgeon: Jong Juan MD;  Location: [facility] OR;  Service: General       Social History:  Social History     Socioeconomic History    Marital status: Single     Spouse name: Not on file    Number of children: Not on file    Years of education: Not on file    Highest education level: Not on file   Occupational History    Not on file   Tobacco Use    Smoking status: Former     Current packs/day: 0.00     Types: Cigarettes     Quit date: 2018     Years since quittin.3    Smokeless tobacco: Never    Tobacco comments:     Pt in the process of quitting    Vaping Use    Vaping status: Every Day    Substances: Nicotine    Devices: Refillable tank   Substance and Sexual Activity    Alcohol use: Never    Drug use: Never    Sexual activity: Not on file   Other Topics Concern    Not on file   Social History Narrative    Not on file     Social Determinants of Health     Financial Resource Strain: Low Risk  (2024)    Financial Resource Strain     Within the past 12 months, have you or your family members you live with been unable to get utilities (heat, electricity) when it was really needed?: No   Food Insecurity: High Risk (2024)    Food Insecurity     Within the past 12 months, did you worry that your food would run out before you got money to buy more?: Yes     Within the past 12 months, did the food you bought just not last and you didn t have money to get more?: Yes   Transportation Needs: High Risk (2024)    Transportation Needs     Within the past 12 months, has lack of  transportation kept you from medical appointments, getting your medicines, non-medical meetings or appointments, work, or from getting things that you need?: Yes   Physical Activity: Insufficiently Active (2/12/2024)    Exercise Vital Sign     Days of Exercise per Week: 5 days     Minutes of Exercise per Session: 20 min   Stress: No Stress Concern Present (2/12/2024)    [place] [employer] - Occupational Stress Questionnaire     Feeling of Stress : Not at all   Social Connections: Unknown (2/12/2024)    Social Connection and Isolation Panel [NHANES]     Frequency of Communication with Friends and Family: Not on file     Frequency of Social Gatherings with Friends and Family: Once a week     Attends Caodaism Services: Not on file     Active Member of Clubs or Organizations: Not on file     Attends Club or Organization Meetings: Not on file     Marital Status: Not on file   Interpersonal Safety: Low Risk  (2/12/2024)    Interpersonal Safety     Do you feel physically and emotionally safe where you currently live?: Yes     Within the past 12 months, have you been hit, slapped, kicked or otherwise physically hurt by someone?: No     Within the past 12 months, have you been humiliated or emotionally abused in other ways by your partner or ex-partner?: No   Housing Stability: Low Risk  (2/12/2024)    Housing Stability     Do you have housing? : Yes     Are you worried about losing your housing?: No       Family History:  Family History   Problem Relation Age of Onset    Clotting Disorder No family hx of        Review of Systems:  A complete review of systems reviewed by me is negative with the exeption of what has been mentioned in the history of present illness.  In the last TWO WEEKS have you experienced any of the following symptoms?  Fevers: No  Night Sweats: No  Weight Gain: Yes  Pain at Night: Yes  Double Vision: No  Changes in Vision: No  Difficulty Breathing through Nose: No  Sore Throat in  "Morning: No  Dry Mouth in the Morning: Yes  Shortness of Breath Lying Flat: No  Shortness of Breath With Activity: No  Awakening with Shortness of Breath: No  Increased Cough: No  Heart Racing at Night: No  Swelling in Feet or Legs: No  Diarrhea at Night: No  Heartburn at Night: Yes  Urinating More than Once at Night: Yes  Losing Control of Urine at Night: No  Joint Pains at Night: Yes  Headaches in Morning: Yes  Weakness in Arms or Legs: No  Depressed Mood: No  Anxiety: Yes     Physical Examination:  Vitals: /83   Pulse 79   Resp 18   Ht 1.753 m (5' 9\")   Wt 127.9 kg (282 lb)   SpO2 97%   BMI 41.64 kg/m    BMI= Body mass index is 41.64 kg/m .    Neck Cir (cm): 47 cm      GENERAL APPEARANCE: healthy, alert, no distress, and cooperative  EYES: Eyes grossly normal to inspection, PERRL, conjunctivae and sclerae normal, and lids and lashes normal  HENT: nose and mouth without ulcers or lesions, oropharynx crowded, uvula elongated, tongue base enlarged, oral mucous membranes moist, tonsillar hypertrophy, and normal cephalic/atraumatic  NECK: no adenopathy, no asymmetry, masses, or scars, thyroid normal to palpation, and trachea midline and normal to palpation  RESP: lungs clear to auscultation - no rales, rhonchi or wheezes  CV: regular rates and rhythm, normal S1 S2, no S3 or S4, and no murmur, click or rub  ABDOMEN: bowel sounds normal, obese, and soft, non-tender  MS: extremities normal- no gross deformities noted  NEURO: Alert and oriented x 3, normal strength and tone, mentation intact, and speech normal  PSYCH: mentation appears normal and affect normal/bright  Mallampati Class: III.  Tonsillar Stage: 3  extending beyond pillars.         Data: All pertinent previous laboratory data reviewed     Recent Labs   Lab Test 02/12/24  1220      POTASSIUM 4.4   CHLORIDE 104   CO2 25   ANIONGAP 11   GLC 95   BUN 12.4   CR 0.77   JESENIA 9.6       Recent Labs   Lab Test 02/12/24  1220   WBC 7.8   RBC 5.23   HGB " "14.7   HCT 44.2   MCV 85   MCH 28.1   MCHC 33.3   RDW 12.5          Recent Labs   Lab Test 02/12/24  1220   PROTTOTAL 7.4   ALBUMIN 4.5   BILITOTAL 0.3   ALKPHOS 88   AST 21   ALT 16       TSH (uIU/mL)   Date Value   02/12/2024 2.18       No results found for: \"UAMP\", \"UBARB\", \"BENZODIAZEUR\", \"UCANN\", \"UCOC\", \"OPIT\", \"UPCP\"    No results found for: \"IRONSAT\", \"AU11132\", \"ELIZABET\"    No results found for: \"PH\", \"PHARTERIAL\", \"PO2\", \"WA8WGFUHQMQ\", \"SAT\", \"PCO2\", \"HCO3\", \"BASEEXCESS\", \"AMIRA\", \"BEB\"    @LABRCNTIPR(phv:4,pco2v:4,po2v:4,hco3v:4,jazmin:4,o2per:4)@    Echocardiology: No results found for this or any previous visit (from the past 4320 hour(s)).    Chest x-ray:   No results found for this or any previous visit from the past 365 days.      Chest CT:   No results found for this or any previous visit from the past 365 days.      PFT: Most Recent [facility] Pulmonary Function Testing    No results found for: \"20001\"  No results found for: \"20002\"  No results found for: \"20003\"  No results found for: \"20015\"  No results found for: \"20016\"  No results found for: \"20027\"  No results found for: \"20028\"  No results found for: \"20029\"  No results found for: \"20079\"  No results found for: \"20080\"  No results found for: \"20081\"  No results found for: \"20335\"  No results found for: \"20105\"  No results found for: \"20053\"  No results found for: \"20054\"  No results found for: \"20055\"      ANA Cleaning CNP 5/13/2024   Sleep Medicine      This note was written with the assistance of the Dragon voice-dictation technology software. The final document, although reviewed, may contain errors. For corrections, please contact the office.          "

## 2024-05-14 ENCOUNTER — TELEPHONE (OUTPATIENT)
Dept: FAMILY MEDICINE | Facility: CLINIC | Age: 44
End: 2024-05-14
Payer: COMMERCIAL

## 2024-05-14 NOTE — TELEPHONE ENCOUNTER
Reason for call:  Other   Patient called regarding (reason for call): call back  Additional comments: patient has still been putting on weight since starting wellbutrin  Please call to advise    Phone number to reach patient:  Home number on file [phone] (home)    Best Time:  any    Can we leave a detailed message on this number?  YES    Travel screening: Not Applicable

## 2024-05-14 NOTE — TELEPHONE ENCOUNTER
Contacted patient for more information about request:     Patient would like PCP input/recommendations, as he continues to gain weight since starting Wellbutrin.  Estimates 50lb weight gain over last 6 months.  Can't see nutritionist until August.  Potentially has procedure coming up as soon as 1 month from now, would like to lose weight prior.  Increased to 300mg dose as instructed 4/18, continues to gain weight.   Patient is curious about starting Ozempic.  Has follow-up appointment scheduled 5/30/24.

## 2024-05-23 ENCOUNTER — TELEPHONE (OUTPATIENT)
Dept: FAMILY MEDICINE | Facility: CLINIC | Age: 44
End: 2024-05-23
Payer: COMMERCIAL

## 2024-05-23 NOTE — TELEPHONE ENCOUNTER
Received letter from Pedro Luis, patient's surgeon regarding need for preoperative exam within 30 days of surgical date.  Open repair of recurrent incisional hernia with mesh scheduled for 7/22/2024 with Dr. Lepe at [facility].  Needs negative nicotine test 1 week prior to surgery, other labs needed within 2 weeks of surgery: Hemoglobin, potassium.     Fax number [phone]  Phone number [phone], option 4.    Will scan letter into chart.    MA team: Please call patient to schedule preop clearance appointment 1 week prior to surgery.

## 2024-05-24 NOTE — TELEPHONE ENCOUNTER
Discussed this at his previous visits.  I recommend Lifestyle modifications, including: Increasing intake of vegetables and fruits, decreasing intake of processed foods/carbohydrates/sugars, having regular physical activity.  Given his abdominal surgery is coming up soon, I would like to wait until he has recovered to consider medications like Ozempic.  We can discuss further questions at his next visit.

## 2024-05-29 NOTE — TELEPHONE ENCOUNTER
Contacted patient with message below from Dr Payne.  Patient has a virtual appointment scheduled with Dr Payne tomorrow and will discuss further.    Charu Jacobsen RN  [facility]    Dr Payne  Discussed this at his previous visits.  I recommend Lifestyle modifications, including: Increasing intake of vegetables and fruits, decreasing intake of processed foods/carbohydrates/sugars, having regular physical activity.  Given his abdominal surgery is coming up soon, I would like to wait until he has recovered to consider medications like Ozempic.  We can discuss further questions at his next visit.

## 2024-05-30 ENCOUNTER — TELEPHONE (OUTPATIENT)
Dept: FAMILY MEDICINE | Facility: CLINIC | Age: 44
End: 2024-05-30

## 2024-05-30 ENCOUNTER — VIRTUAL VISIT (OUTPATIENT)
Dept: FAMILY MEDICINE | Facility: CLINIC | Age: 44
End: 2024-05-30
Payer: COMMERCIAL

## 2024-05-30 DIAGNOSIS — R10.84 ABDOMINAL PAIN, GENERALIZED: Primary | ICD-10-CM

## 2024-05-30 DIAGNOSIS — E66.813 CLASS 3 SEVERE OBESITY WITHOUT SERIOUS COMORBIDITY WITH BODY MASS INDEX (BMI) OF 40.0 TO 44.9 IN ADULT, UNSPECIFIED OBESITY TYPE (H): ICD-10-CM

## 2024-05-30 DIAGNOSIS — E66.01 CLASS 3 SEVERE OBESITY WITHOUT SERIOUS COMORBIDITY WITH BODY MASS INDEX (BMI) OF 40.0 TO 44.9 IN ADULT, UNSPECIFIED OBESITY TYPE (H): ICD-10-CM

## 2024-05-30 DIAGNOSIS — K43.9 VENTRAL HERNIA WITHOUT OBSTRUCTION OR GANGRENE: ICD-10-CM

## 2024-05-30 DIAGNOSIS — G89.29 CHRONIC BILATERAL LOW BACK PAIN WITHOUT SCIATICA: ICD-10-CM

## 2024-05-30 DIAGNOSIS — M54.50 CHRONIC BILATERAL LOW BACK PAIN WITHOUT SCIATICA: ICD-10-CM

## 2024-05-30 DIAGNOSIS — F33.1 MODERATE EPISODE OF RECURRENT MAJOR DEPRESSIVE DISORDER (H): ICD-10-CM

## 2024-05-30 PROCEDURE — 99214 OFFICE O/P EST MOD 30 MIN: CPT | Mod: 95 | Performed by: STUDENT IN AN ORGANIZED HEALTH CARE EDUCATION/TRAINING PROGRAM

## 2024-05-30 PROCEDURE — G2211 COMPLEX E/M VISIT ADD ON: HCPCS | Mod: 95 | Performed by: STUDENT IN AN ORGANIZED HEALTH CARE EDUCATION/TRAINING PROGRAM

## 2024-05-30 PROCEDURE — 96127 BRIEF EMOTIONAL/BEHAV ASSMT: CPT | Mod: 95 | Performed by: STUDENT IN AN ORGANIZED HEALTH CARE EDUCATION/TRAINING PROGRAM

## 2024-05-30 RX ORDER — AZITHROMYCIN 250 MG/1
TABLET, FILM COATED ORAL
COMMUNITY
Start: 2024-05-29 | End: 2024-07-12

## 2024-05-30 RX ORDER — METRONIDAZOLE 500 MG/1
1 TABLET ORAL
COMMUNITY
Start: 2024-05-22 | End: 2024-07-12

## 2024-05-30 RX ORDER — DULOXETIN HYDROCHLORIDE 30 MG/1
30 CAPSULE, DELAYED RELEASE ORAL 2 TIMES DAILY
Qty: 60 CAPSULE | Refills: 1 | Status: SHIPPED | OUTPATIENT
Start: 2024-05-30 | End: 2024-07-12

## 2024-05-30 ASSESSMENT — PATIENT HEALTH QUESTIONNAIRE - PHQ9
SUM OF ALL RESPONSES TO PHQ QUESTIONS 1-9: 9
SUM OF ALL RESPONSES TO PHQ QUESTIONS 1-9: 9
10. IF YOU CHECKED OFF ANY PROBLEMS, HOW DIFFICULT HAVE THESE PROBLEMS MADE IT FOR YOU TO DO YOUR WORK, TAKE CARE OF THINGS AT HOME, OR GET ALONG WITH OTHER PEOPLE: VERY DIFFICULT

## 2024-05-30 NOTE — PROGRESS NOTES
Moderate episode of recurrent major depressive disorder (H)  Comments:  Chronic, improved, still uncontrolled.  Will increase Wellbutrin to 300 mg daily.  RTC in 1 month.  Orders:  -     buPROPion (WELLBUTRIN XL) 300 MG 24 hr tablet; Take 1 tablet (300 mg) by mouth every morning      3/19/2024     1:59 PM 5/30/2024    11:35 AM   PHQ   PHQ-9 Total Score 10 9   Q9: Thoughts of better off dead/self-harm past 2 weeks Not at all Not at all          Class 3 severe obesity without serious comorbidity with body mass index (BMI) of 40.0 to 44.9 in adult, unspecified obesity type (H)  Comments:  Increase Wellbutrin.  Has appointment with nutrition coming up in June and weight management clinic in August.  Discussed lifestyle modifications.  Orders:  -     buPROPion (WELLBUTRIN XL) 300 MG 24 hr tablet; Take 1 tablet (300 mg) by mouth every morning     S/P repair of ventral hernia  Comments:  Follows with surgeons in [place].  Reports that he needs to do a CT next week to determine surgery.  Would like to lose weight before then.     Chronic bilateral low back pain without sciatica  Comments:  Saw pain management, does not recommend opioid pain medications.  Discussed possibly adding duloxetine at next visit.

## 2024-05-30 NOTE — PROGRESS NOTES
COPD Team:    Attempted with work with pt in COPD education.  Pt requested that we speak with her daughter.  She said that she is hard of hearing and that wearing the isolation masks make it harder for her to communicate with us.  She states that her daughter takes care of her healthcare, but she gets here later in the day.  I left a note for daughter requesting dates and times that would be good for her to talk about COPD.   Yo is a 44 year old who is being evaluated via a billable video visit.        Yo was seen today for office visit.    Diagnoses and all orders for this visit:    Abdominal pain, generalized  Comments:  Patient reports he had abdominal pain a few weeks ago.  Was seen in urgent care, CT recommended, declined.  Was given antibiotics.  Resolved.    Ventral hernia without obstruction or gangrene  Comments:  Surgery planned for July.  Will need preop within 30 days prior.    Class 3 severe obesity without serious comorbidity with body mass index (BMI) of 40.0 to 44.9 in adult, unspecified obesity type (H)  Comments:  On Wellbutrin 300 mg, has not made a difference.  Patient would like to try GLP-1, discussed side effects, will start low-dose.  hold prior to surgery.  Orders:  -     Semaglutide-Weight Management (WEGOVY) 0.25 MG/0.5ML pen; Inject 0.25 mg Subcutaneous once a week  -Referred to nutrition and weight management clinic previously, appointments scheduled.  -Discussed Lifestyle modifications, including: Increasing intake of vegetables and fruits, decreasing intake of processed foods/carbohydrates/sugars, having regular physical activity, and losing weight.    Chronic bilateral low back pain without sciatica  Comments:  Saw pain management, recommend neuropathic muscle relaxants, PT, pool therapy.  Duloxetine as noted below.  Orders:  -     DULoxetine (CYMBALTA) 30 MG capsule; Take 1 capsule (30 mg) by mouth 2 times daily    Moderate episode of recurrent major depressive disorder (H)  Comments:  Chronic, uncontrolled.  On Wellbutrin 300 mg.  Will add duloxetine 30 mg daily for comorbid pain.  RTC 4 weeks.  Orders:  -     DULoxetine (CYMBALTA) 30 MG capsule; Take 1 capsule (30 mg) by mouth 2 times daily  -     Adult Mental Health  Referral; Future          Subjective   Yo is a 44 year old, presenting for the following health issues:  office visit (Follow up on med)      Video Start Time: 11:30  PM    History of Present Illness       Reason for visit:  Med check    He eats 2-3 servings of fruits and vegetables daily.He consumes 1 sweetened beverage(s) daily.He exercises with enough effort to increase his heart rate 20 to 29 minutes per day.  He exercises with enough effort to increase his heart rate 3 or less days per week.   He is taking medications regularly.           3/19/2024     1:59 PM 5/30/2024    11:35 AM   PHQ   PHQ-9 Total Score 10 9   Q9: Thoughts of better off dead/self-harm past 2 weeks Not at all Not at all             Objective           Vitals:  No vitals were obtained today due to virtual visit.    Physical Exam   GENERAL: alert and no distress  EYES: Eyes grossly normal to inspection.  No discharge or erythema, or obvious scleral/conjunctival abnormalities.  RESP: No audible wheeze, cough, or visible cyanosis.    SKIN: Visible skin clear. No significant rash, abnormal pigmentation or lesions.  NEURO: Cranial nerves grossly intact.  Mentation and speech appropriate for age.  PSYCH: Appropriate affect, tone, and pace of words          Video-Visit Details    Type of service:  Video Visit   Video End Time: 11:50 PM  Originating Location (pt. Location): Home    Distant Location (provider location):  On-site  Platform used for Video Visit: Lei  Signed Electronically by: Cornelia Payne MD

## 2024-05-30 NOTE — TELEPHONE ENCOUNTER
General Call    Contacts         Type Contact Phone/Fax    05/30/2024 11:33 AM CDT Phone (Incoming) Yo East (Self) [phone] (M)          Reason for Call:  Call to check in virtual appt    What are your questions or concerns:  Patient called to help log in to virtual appt today with Dr. Payne. During call, MA called patient to check patient in.

## 2024-06-10 ENCOUNTER — TELEPHONE (OUTPATIENT)
Dept: FAMILY MEDICINE | Facility: CLINIC | Age: 44
End: 2024-06-10
Payer: COMMERCIAL

## 2024-06-10 NOTE — TELEPHONE ENCOUNTER
PA Initiation    Medication: WEGOVY 0.25 MG/0.5ML SC SOAJ  Insurance Company: [employer]AP - Phone [phone] Fax [phone]  Pharmacy Filling the Rx: [facility]  Filling Pharmacy Phone:    Filling Pharmacy Fax:    Start Date: 6/10/2024    [id number]

## 2024-06-10 NOTE — TELEPHONE ENCOUNTER
Pt called asking for Duloxetine instruction clarification. Advised pt per chart review, Duloxetine 30mg instruction says to take 1 capsule by mouth 2 times daily. Pt verb understanding. No other questions.     Elin MAGDALENO RN  [facility]

## 2024-06-12 ENCOUNTER — HOSPITAL ENCOUNTER (OUTPATIENT)
Dept: NUTRITION | Facility: CLINIC | Age: 44
Discharge: HOME | End: 2024-06-12
Attending: STUDENT IN AN ORGANIZED HEALTH CARE EDUCATION/TRAINING PROGRAM | Admitting: STUDENT IN AN ORGANIZED HEALTH CARE EDUCATION/TRAINING PROGRAM
Payer: COMMERCIAL

## 2024-06-12 DIAGNOSIS — E66.01 CLASS 3 SEVERE OBESITY WITHOUT SERIOUS COMORBIDITY WITH BODY MASS INDEX (BMI) OF 40.0 TO 44.9 IN ADULT, UNSPECIFIED OBESITY TYPE (H): ICD-10-CM

## 2024-06-12 DIAGNOSIS — E66.813 CLASS 3 SEVERE OBESITY WITHOUT SERIOUS COMORBIDITY WITH BODY MASS INDEX (BMI) OF 40.0 TO 44.9 IN ADULT, UNSPECIFIED OBESITY TYPE (H): ICD-10-CM

## 2024-06-12 PROCEDURE — 97802 MEDICAL NUTRITION INDIV IN: CPT | Mod: GT,95 | Performed by: DIETITIAN, REGISTERED

## 2024-06-12 NOTE — PROGRESS NOTES
"[facility]  Medical Nutrition Therapy    Visit Type: Initial Assessment    Yo East, 44 year old referred by Cornelia Payne MD for MNT related to E66.01, Z68.41 (ICD-10-CM) - Class 3 severe obesity without serious comorbidity with body mass index (BMI) of 40.0 to 44.9 in adult, unspecified obesity type (H)     Virtual Visit Details    Type of service:  Video Visit   Video Start Time:  11:15  Video End Time: 12:10    Originating Location (pt. Location): Home    Distant Location (provider location):  On-site  Platform used for Video Visit: [employer]         Nutrition Assessment:  Anthropometrics  Height:   Ht Readings from Last 1 Encounters:   05/13/24 1.753 m (5' 9\")         BMI:  41.7   Weight Status:  Obesity Grade III BMI >40   Weight:   Wt Readings from Last 1 Encounters:   05/13/24 127.9 kg (282 lb)       UBW: 266 lb       IBW:  73 kg (160 lb) IBW %: 176%        Weight History:   Wt Readings from Last 20 Encounters:   05/13/24 127.9 kg (282 lb)   03/19/24 122.9 kg (271 lb)   03/05/24 124.3 kg (274 lb)   02/16/24 124.3 kg (274 lb 1.6 oz)   02/12/24 120.7 kg (266 lb)   05/21/20 107 kg (236 lb)   03/16/20 103.4 kg (228 lb)   03/16/20 103.4 kg (228 lb)   03/16/20 103.4 kg (228 lb)   03/13/20 120.7 kg (266 lb)   03/13/20 120.7 kg (266 lb)   05/20/19 122.5 kg (270 lb)     -Wt gain of 54 lb (23.7%) over the past 4 years. 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   -Has a surgery coming up for abdominal reconstruction to repair a hernia. He'd like to lose weight before the surgery.   -Patient feels like stress has contributed to the weight gain - eats more when stressed.       Labs:   Recent Labs   Lab Test 02/12/24  1220   CHOL 162   HDL 30*   TRIG 418*       Meds:   Current Outpatient Medications   Medication Instructions    acetaminophen (TYLENOL) 500-1,000 mg, Oral, EVERY 8 HOURS PRN    azithromycin (ZITHROMAX) 250 MG tablet     buPROPion (WELLBUTRIN XL) 300 mg, Oral, EVERY MORNING    cyclobenzaprine (FLEXERIL) 5 mg, Oral, 3 TIMES DAILY PRN    DULoxetine (CYMBALTA) 30 mg, Oral, 2 TIMES DAILY    ibuprofen (ADVIL/MOTRIN) 600 mg, Oral, EVERY 6 HOURS PRN    levalbuterol (XOPENEX HFA) 45 mcg/actuation inhaler 1-2 puffs, EVERY 4 HOURS PRN    metroNIDAZOLE (FLAGYL) 500 MG tablet 1 tablet, Oral, 3 TIMES DAILY (Diuretics and Nitrates)    OLANZapine (ZYPREXA) 10 mg, Oral, AT BEDTIME    Semaglutide-Weight Management (WEGOVY) 0.25 mg, Subcutaneous, WEEKLY    VENTOLIN  (90 Base) MCG/ACT inhaler    -Hasn't been able to start Wegovy yet due to insurance issues    Supplements: reviewed      Social/Environmental:   -average sleep per night: not discussed  -level of stress: not discussed       Food Record  Breakfast: 3-4 days per week eats breakfast: bowl of cereal such as Raisin Bran 2 cups with 1% milk OR 2 chorizo burritos (4 eggs, chorizo sausage) OR honeydew, 8 oz coffee  Lunch: [employer]'s potato chips small bag and other vending machine snacks   Dinner: 7-8:00 pm: pork tenderloin with frozen veggies and a dinner salad OR burger and chips and fries and sweet potatoes   Snacks: Ice cream every other day - 2 servings   Beverages: 2 Diet Mt. Dews per day,  water (16-24 oz), no juice anymore - used to drink a lot, no sports drinks, energy drinks not anymore  -Take-out 2-3 times per month: burger and fries, coleslaw with Dr. Pepper (finishes the whole meal)  -Stopped drinking soda   -Eats ice cream   -No butter used in cooking, uses canola oil  -Salt used in cooking sometimes    Nutritional Details:   -Food allergies: none  -Food intolerances: none   -Food sensitivities: none  -GI concerns:  none  -Appetite: good   -Pace of eating: fast  -Role of cooking: self   -Role of food shopping: self       Physical Activity:  Was riding his bike to work but quit doing that.  Plays [employer]      ASSESSED MALNUTRITION STATUS  % Weight Loss:  None noted  % Intake:  No decreased intake noted  Subcutaneous Fat Loss:  None observed  Loss of Muscle Mass:  None observed  Fluid Retention:  None noted    Malnutrition Diagnosis:  Patient does not meet two of the above criteria necessary for diagnosing malnutrition        Nutrition Diagnosis:  Excessive energy intake related to food and nutrition knowledge deficit as evidenced by usual dietary intake exceeding energy needs by and estimated 125%, BMI 41.7, low daily physical activity, and report of not knowing nutrition for weight loss.        Nutrition Intervention:  Nutrition Prescription Summary: MNT for Weight Management      Nutrition Education (Content):  -Educated pt on using MyPlate for meal planning and discussed portion sizes   -Discussed recommended and not recommended foods (choosing WGs, lean meats, low-fat dairy, fresh fruits & veggies, unsat fats, and limiting high-sodium and refined sugar foods)  -Educated pt on metabolism and used the fire analogy; talked about the importance of consuming consistent meals/snacks throughout the day and listening to hunger/fullness cues (handout provided)  -Discussed healthy snack options- protein+complex CHO  -Educated pt on reading food labels  -Discussed ways to make healthy choices when  dining out (choosing baked, broiled, or grilled, unbreaded meats w/o sauces/gravies or choosing them on the side to control amount used)  -Encouraged pt to drink more water throughout the day and explained the importance of hydration.   -Encouraged pt to increase daily PA- include cardiovascular activities w/ultimate goal of 60 min per day     Emailed/mailed information discussed including nutrition handouts to patient.       Nutrition Prescription: Macronutrient and Micronutrient details   Dosing weight: Current wt (128 kg) for energy and fluids, IBW (73 kg) for protein  Energy: 1265-2611 kcals/day ([place] Jeor)    Justification:  (obesity, to promote a 2 lb wt loss per week)   Protein: 73-88 g Pro/day (1-1.2 g pro/Kg)    Justification:  (obesity guidelines )   Fluid: 3906-6221 mL/day   (1 mL/Kcal)     Justification:  (obese)   Fiber:     Men (19-50 years): 38 grams per day         Carbohydrate: 45-65% kcal   <25 g added sugar/day       Fat: 20-35% kcal  <7% kcal from saturated fat   Micronutrient: DRI  <2,300 mg sodium/day            Vitamin and Mineral Supplements: n/a       Patient Engagement:   Assessed learning needs and learning preference: Yes.  Teaching Method(s) used: Explanation    Nutrition Education (Application):  a)  Discussed current eating plans / recommended alternative food choices    b)  Patient verbalizes understanding of diet by asking questions and goal setting      Anticipate >75% compliance   Stage of Change:  preparation      Nutrition Goals:  1) Increase exercise - aim for 15 min of elliptical machine and do 15 min of free weights per day   2) Track calories and aim for 5257-1013 calories per day using My Fitness Pal or Cronometer   3) Add more veggies to the diet - aim for 2.5 cups of veggies per day   4) Instead of eating ice cream, eat yogurt with fruit (Chobani Zero, Oikos Triple Zero, Oikos Pro, or Two Good)   5) Work on meal planning - create a weekly menu, make a grocery list,  and grocery shop once per week     Nutrition Follow Up / Monitoring:   Weight, PO intake, PA, labs (lipid panel)      Nutrition Recommendations:  Patient to follow-up with RD in 8 weeks.  Patient has RD contact information to call/email if needed.      Start time: 11:15  End time: 12:10    Total Time Duration: 55 min      Laura Thomas MS, RD, LD, CS[facility]  Clinical Dietitian  [phone]

## 2024-06-14 NOTE — TELEPHONE ENCOUNTER
Prior Authorization Approval    Medication: WEGOVY 0.25 MG/0.5ML SC SOAJ  Authorization Effective Date: 6/14/2024  Authorization Expiration Date: 6/10/2025  Approved Dose/Quantity: 2 ml  Reference #: [id number]   Insurance Company: [employer]AP - Phone [phone] Fax [phone]  Expected CoPay: $    CoPay Card Available: No    Financial Assistance Needed: no  Which Pharmacy is filling the prescription: [address]  Pharmacy Notified: yes  Patient Notified: yes

## 2024-06-26 DIAGNOSIS — E66.813 CLASS 3 SEVERE OBESITY WITHOUT SERIOUS COMORBIDITY WITH BODY MASS INDEX (BMI) OF 40.0 TO 44.9 IN ADULT, UNSPECIFIED OBESITY TYPE (H): ICD-10-CM

## 2024-06-26 DIAGNOSIS — E66.01 CLASS 3 SEVERE OBESITY WITHOUT SERIOUS COMORBIDITY WITH BODY MASS INDEX (BMI) OF 40.0 TO 44.9 IN ADULT, UNSPECIFIED OBESITY TYPE (H): ICD-10-CM

## 2024-06-26 NOTE — TELEPHONE ENCOUNTER
Patient calling clinic back and requesting a refill of Wegovy now that it is approved  Medication pended and sent to Karen GARCIA as PCP, Dr Payne out of clinic.    Charu Jacobsen RN  [facility]          Prior Authorization Approval     Medication: WEGOVY 0.25 MG/0.5ML SC SOAJ  Authorization Effective Date: 6/14/2024  Authorization Expiration Date: 6/10/2025  Approved Dose/Quantity: 2 ml  Reference #: [id number]   Insurance Company: [employer]AP - Phone [phone] Fax [phone]  Expected CoPay: $    CoPay Card Available: No    Financial Assistance Needed: no  Which Pharmacy is filling the prescription: [place], [place] - [address] [place] [address]  Pharmacy Notified: yes  Patient Notified: yes

## 2024-06-27 NOTE — TELEPHONE ENCOUNTER
Patient returned call. Wegovy is out of stock at several pharmacies. He did find a pharmacy that has this in stock, but only for the 0.5 mg dose. He is inquiring if PCP would approve of a higher dose as this is his only option.     Advised that we typically start with a smaller dose and slowly increase as tolerated to avoid side effects--he verbalized understanding and is still wanting to start with 0.5 mg/dose. He is strongly desiring to lose weight for an upcoming surgery on 7/22.

## 2024-07-10 ENCOUNTER — OFFICE VISIT (OUTPATIENT)
Dept: FAMILY MEDICINE | Facility: CLINIC | Age: 44
End: 2024-07-10
Payer: COMMERCIAL

## 2024-07-10 VITALS
SYSTOLIC BLOOD PRESSURE: 145 MMHG | OXYGEN SATURATION: 100 % | HEIGHT: 69 IN | RESPIRATION RATE: 20 BRPM | TEMPERATURE: 97.8 F | WEIGHT: 288 LBS | BODY MASS INDEX: 42.65 KG/M2 | HEART RATE: 71 BPM | DIASTOLIC BLOOD PRESSURE: 89 MMHG

## 2024-07-10 DIAGNOSIS — K59.00 CONSTIPATION, UNSPECIFIED CONSTIPATION TYPE: ICD-10-CM

## 2024-07-10 DIAGNOSIS — K21.00 GASTROESOPHAGEAL REFLUX DISEASE WITH ESOPHAGITIS, UNSPECIFIED WHETHER HEMORRHAGE: ICD-10-CM

## 2024-07-10 DIAGNOSIS — G47.10 EXCESSIVE SLEEPINESS: ICD-10-CM

## 2024-07-10 DIAGNOSIS — Z86.59 HISTORY OF PSYCHOSIS: ICD-10-CM

## 2024-07-10 DIAGNOSIS — F15.91 HISTORY OF METHAMPHETAMINE USE: ICD-10-CM

## 2024-07-10 DIAGNOSIS — K43.9 VENTRAL HERNIA WITHOUT OBSTRUCTION OR GANGRENE: Primary | ICD-10-CM

## 2024-07-10 DIAGNOSIS — Z00.00 HEALTHCARE MAINTENANCE: ICD-10-CM

## 2024-07-10 LAB
ALT SERPL W P-5'-P-CCNC: 29 U/L (ref 0–70)
CHOLEST SERPL-MCNC: 180 MG/DL
FASTING STATUS PATIENT QL REPORTED: ABNORMAL
HDLC SERPL-MCNC: 32 MG/DL
HGB BLD-MCNC: 15.3 G/DL (ref 13.3–17.7)
LDLC SERPL CALC-MCNC: 110 MG/DL
NONHDLC SERPL-MCNC: 148 MG/DL
TRIGL SERPL-MCNC: 191 MG/DL

## 2024-07-10 PROCEDURE — 84460 ALANINE AMINO (ALT) (SGPT): CPT | Performed by: FAMILY MEDICINE

## 2024-07-10 PROCEDURE — 99214 OFFICE O/P EST MOD 30 MIN: CPT | Performed by: FAMILY MEDICINE

## 2024-07-10 PROCEDURE — 80061 LIPID PANEL: CPT | Performed by: FAMILY MEDICINE

## 2024-07-10 PROCEDURE — 85018 HEMOGLOBIN: CPT | Performed by: FAMILY MEDICINE

## 2024-07-10 PROCEDURE — 36415 COLL VENOUS BLD VENIPUNCTURE: CPT | Performed by: FAMILY MEDICINE

## 2024-07-10 RX ORDER — POLYETHYLENE GLYCOL 3350 17 G/17G
1 POWDER, FOR SOLUTION ORAL DAILY
Qty: 510 G | Refills: 2 | Status: SHIPPED | OUTPATIENT
Start: 2024-07-10 | End: 2024-07-12

## 2024-07-10 ASSESSMENT — PATIENT HEALTH QUESTIONNAIRE - PHQ9
SUM OF ALL RESPONSES TO PHQ QUESTIONS 1-9: 6
SUM OF ALL RESPONSES TO PHQ QUESTIONS 1-9: 6
10. IF YOU CHECKED OFF ANY PROBLEMS, HOW DIFFICULT HAVE THESE PROBLEMS MADE IT FOR YOU TO DO YOUR WORK, TAKE CARE OF THINGS AT HOME, OR GET ALONG WITH OTHER PEOPLE: SOMEWHAT DIFFICULT

## 2024-07-10 NOTE — ASSESSMENT & PLAN NOTE
Not discussed today, patient not currently seeing psychiatrist, indicates no significant problems.

## 2024-07-10 NOTE — ASSESSMENT & PLAN NOTE
Declines vaccines, open to the idea of pneumococcal vaccine indicated due to history of smoking.  Will defer for now.  Lipid panel ordered.

## 2024-07-10 NOTE — LETTER
July 11, 2024      Yo MATILDE East  [address] [place] [address]        Dear ,    We are writing to inform you of your test results.    Main problem with cholesterol is low HDL, chest.  I would recommend working on increasing exercise and vegetables, 40.  We can recheck in 1 year.  No need for medication at this point.  Blood count and liver tests are normal.      Resulted Orders   Hemoglobin   Result Value Ref Range    Hemoglobin 15.3 13.3 - 17.7 g/dL   Lipid panel reflex to direct LDL Non-fasting   Result Value Ref Range    Cholesterol 180 <200 mg/dL    Triglycerides 191 (H) <150 mg/dL    Direct Measure HDL 32 (L) >=40 mg/dL    LDL Cholesterol Calculated 110 (H) <=100 mg/dL    Non HDL Cholesterol 148 (H) <130 mg/dL    Patient Fasting > 8hrs? Unknown     Narrative    Cholesterol  Desirable:  <200 mg/dL    Triglycerides  Normal:  Less than 150 mg/dL  Borderline High:  150-199 mg/dL  High:  200-499 mg/dL  Very High:  Greater than or equal to 500 mg/dL    Direct Measure HDL  Female:  Greater than or equal to 50 mg/dL   Male:  Greater than or equal to 40 mg/dL    LDL Cholesterol  Desirable:  <100mg/dL  Above Desirable:  100-129 mg/dL   Borderline High:  130-159 mg/dL   High:  160-189 mg/dL   Very High:  >= 190 mg/dL    Non HDL Cholesterol  Desirable:  130 mg/dL  Above Desirable:  130-159 mg/dL  Borderline High:  160-189 mg/dL  High:  190-219 mg/dL  Very High:  Greater than or equal to 220 mg/dL   ALT   Result Value Ref Range    ALT 29 0 - 70 U/L       If you have any questions or concerns, please call the clinic at the number listed above.       Sincerely,      Fox Appiah MD

## 2024-07-10 NOTE — ASSESSMENT & PLAN NOTE
Previously had very good results with brief course of omeprazole followed by as needed medication.  Currently using only Tums.  Restart omeprazole, encouraged brief use, maybe a week or 2, followed by as needed

## 2024-07-10 NOTE — PROGRESS NOTES
Assessment/ Plan  Ventral hernia without obstruction or gangrene  Previous ventral hernia repair apparently has failed.  Plan for revision.  Surgery initially scheduled, then delayed.  This was to be preop.  Plan is to try to lose weight with Wegovy before proceeding with surgery.  Surgery now scheduled in October.    History of psychosis  Not discussed today, patient not currently seeing psychiatrist, indicates no significant problems.      History of methamphetamine use  Not discussed    Healthcare maintenance  Declines vaccines, open to the idea of pneumococcal vaccine indicated due to history of smoking.  Will defer for now.  Lipid panel ordered.    Gastroesophageal reflux disease with esophagitis, unspecified whether hemorrhage  Previously had very good results with brief course of omeprazole followed by as needed medication.  Currently using only Tums.  Restart omeprazole, encouraged brief use, maybe a week or 2, followed by as needed    Excessive sleepiness  Has seen sleep medicine which indicated high pretest probability for ABDOUL.  Patient indicates that he has sleep study in November.    Constipation, unspecified constipation type  Severe since starting Wegovy.  Has been using stool softener and more recently senna.  Recommend increasing fruits and vegetables as he is already trying to do, daily MiraLAX, titrate dose to soft stool, stool softener and reserve senna for occasional use.   Subjective  CC:  chief complaint  HPI:  44-year-old, originally scheduled for preop physical for repair of ventral hernia.  He has had this done before and it has failed.  He and his surgeon decided to delay surgery until October since he is working on weight loss on Wegovy.  This to better the odds of success.    He has quit smoking and is now vaping.    Taking Wegovy, has had severe constipation problems with some abdominal discomfort since starting it.  Also problems with daytime sleepiness, he is seeing a sleep doctor as  described above.  Also, heartburn.  PFSH:  Patient Active Problem List   Diagnosis    S/P repair of ventral hernia    Mild intermittent asthma without complication    Class 3 severe obesity without serious comorbidity with body mass index (BMI) of 40.0 to 44.9 in adult, unspecified obesity type (H)    Ventral hernia without obstruction or gangrene    History of ADHD    History of psychosis    Excessive sleepiness    Chronic bilateral low back pain without sciatica    Hypertriglyceridemia    Carpal tunnel syndrome of left wrist    HTN (hypertension)    PTSD (post-traumatic stress disorder)    Recurrent ventral incisional hernia    Varicose veins of right lower extremity, unspecified whether complicated    Constipation, unspecified constipation type    Gastroesophageal reflux disease with esophagitis, unspecified whether hemorrhage    History of methamphetamine use    Healthcare maintenance     Current Outpatient Medications   Medication Sig Dispense Refill    acetaminophen (TYLENOL) 500 MG tablet Take 1-2 tablets (500-1,000 mg) by mouth every 8 hours as needed for mild pain 90 tablet 3    azithromycin (ZITHROMAX) 250 MG tablet       buPROPion (WELLBUTRIN XL) 300 MG 24 hr tablet Take 1 tablet (300 mg) by mouth every morning 90 tablet 1    cyclobenzaprine (FLEXERIL) 5 MG tablet Take 1 tablet (5 mg) by mouth 3 times daily as needed for muscle spasms 30 tablet 3    DULoxetine (CYMBALTA) 30 MG capsule Take 1 capsule (30 mg) by mouth 2 times daily 60 capsule 1    ibuprofen (ADVIL/MOTRIN) 600 MG tablet Take 1 tablet (600 mg) by mouth every 6 hours as needed for moderate pain 30 tablet 1    levalbuterol (XOPENEX HFA) 45 mcg/actuation inhaler [LEVALBUTEROL (XOPENEX HFA) 45 MCG/ACTUATION INHALER] Inhale 1-2 puffs every 4 (four) hours as needed for wheezing.      metroNIDAZOLE (FLAGYL) 500 MG tablet Take 1 tablet by mouth 3 times daily      OLANZapine (ZYPREXA) 10 MG tablet Take 1 tablet (10 mg) by mouth at bedtime 90 tablet 1  "   omeprazole (PRILOSEC) 20 MG DR capsule Take 1 capsule (20 mg) by mouth daily 30 capsule 3    polyethylene glycol (MIRALAX) 17 GM/Dose powder Take 17 g (1 Capful) by mouth daily 510 g 2    Semaglutide-Weight Management (WEGOVY) 0.25 MG/0.5ML pen Inject 0.25 mg Subcutaneous once a week 2 mL 0    Semaglutide-Weight Management (WEGOVY) 0.5 MG/0.5ML pen Inject 0.5 mg Subcutaneous once a week 2 mL 0    VENTOLIN  (90 Base) MCG/ACT inhaler        No current facility-administered medications for this visit.        History   Smoking Status    Former    Types: Cigarettes   Smokeless Tobacco    Never     Social History     Social History Narrative    Not on file     Patient Care Team:  Cornelia Payne MD as PCP - General (Family Medicine)  Zackary Castro MD as MD (Critical Care)  Tasneem Flores MD as Anesthesiologist (Pain Medicine)  Cornelia Payne MD as Assigned PCP  Kyler Reeves DPM as Assigned Musculoskeletal Provider  Fox Burnett APRN CNP as Assigned Sleep Provider        Objective  Physical Exam  Vitals:    07/10/24 1017   BP: (!) 145/89   BP Location: Left arm   Patient Position: Sitting   Cuff Size: Adult Regular   Pulse: 71   Resp: 20   Temp: 97.8  F (36.6  C)   TempSrc: Temporal   SpO2: 100%   Weight: 130.6 kg (288 lb)   Height: 1.753 m (5' 9\")     Body mass index is 42.53 kg/m .  Gen- alert, oriented/ appropriately responsive  HEENT- normal cephalic, atraumatic.   Chest- Normal inspiration and expiration.    Clear to ascultation.    No chest wall deformity or scar.  CV- Heart regular rate and rhythm  normal tones, no murmurs   No gallops or rubs.  Ext- appear well perfused, no edema  Skin- warm and dry,   no visualized rash    Diagnostics:   Pending      Please note: Voice recognition software was used in this dictation.  It may therefore contain typographical errors.  A total of 35 minutes was spent on this visit reviewing previous notes, counseling patient, ordering tests , adjusting meds (see " below) and documenting the findings in this note

## 2024-07-10 NOTE — ASSESSMENT & PLAN NOTE
Has seen sleep medicine which indicated high pretest probability for ABDOUL.  Patient indicates that he has sleep study in November.

## 2024-07-10 NOTE — ASSESSMENT & PLAN NOTE
Severe since starting Wegovy.  Has been using stool softener and more recently senna.  Recommend increasing fruits and vegetables as he is already trying to do, daily MiraLAX, titrate dose to soft stool, stool softener and reserve senna for occasional use.

## 2024-07-10 NOTE — ASSESSMENT & PLAN NOTE
Previous ventral hernia repair apparently has failed.  Plan for revision.  Surgery initially scheduled, then delayed.  This was to be preop.  Plan is to try to lose weight with Wegovy before proceeding with surgery.  Surgery now scheduled in October.

## 2024-07-12 ENCOUNTER — VIRTUAL VISIT (OUTPATIENT)
Dept: FAMILY MEDICINE | Facility: CLINIC | Age: 44
End: 2024-07-12
Payer: COMMERCIAL

## 2024-07-12 ENCOUNTER — TELEPHONE (OUTPATIENT)
Dept: FAMILY MEDICINE | Facility: CLINIC | Age: 44
End: 2024-07-12

## 2024-07-12 DIAGNOSIS — F41.1 GAD (GENERALIZED ANXIETY DISORDER): ICD-10-CM

## 2024-07-12 DIAGNOSIS — K59.00 CONSTIPATION, UNSPECIFIED CONSTIPATION TYPE: ICD-10-CM

## 2024-07-12 DIAGNOSIS — G89.29 CHRONIC BILATERAL LOW BACK PAIN WITHOUT SCIATICA: ICD-10-CM

## 2024-07-12 DIAGNOSIS — Z98.890 S/P REPAIR OF VENTRAL HERNIA: ICD-10-CM

## 2024-07-12 DIAGNOSIS — E66.813 CLASS 3 SEVERE OBESITY WITHOUT SERIOUS COMORBIDITY WITH BODY MASS INDEX (BMI) OF 40.0 TO 44.9 IN ADULT, UNSPECIFIED OBESITY TYPE (H): ICD-10-CM

## 2024-07-12 DIAGNOSIS — K43.9 VENTRAL HERNIA WITHOUT OBSTRUCTION OR GANGRENE: ICD-10-CM

## 2024-07-12 DIAGNOSIS — M54.50 CHRONIC BILATERAL LOW BACK PAIN WITHOUT SCIATICA: ICD-10-CM

## 2024-07-12 DIAGNOSIS — E66.01 CLASS 3 SEVERE OBESITY WITHOUT SERIOUS COMORBIDITY WITH BODY MASS INDEX (BMI) OF 40.0 TO 44.9 IN ADULT, UNSPECIFIED OBESITY TYPE (H): ICD-10-CM

## 2024-07-12 DIAGNOSIS — F33.1 MODERATE EPISODE OF RECURRENT MAJOR DEPRESSIVE DISORDER (H): Primary | ICD-10-CM

## 2024-07-12 DIAGNOSIS — Z87.19 S/P REPAIR OF VENTRAL HERNIA: ICD-10-CM

## 2024-07-12 PROCEDURE — 99214 OFFICE O/P EST MOD 30 MIN: CPT | Mod: 95 | Performed by: STUDENT IN AN ORGANIZED HEALTH CARE EDUCATION/TRAINING PROGRAM

## 2024-07-12 PROCEDURE — G2211 COMPLEX E/M VISIT ADD ON: HCPCS | Mod: 95 | Performed by: STUDENT IN AN ORGANIZED HEALTH CARE EDUCATION/TRAINING PROGRAM

## 2024-07-12 RX ORDER — POLYETHYLENE GLYCOL 3350 17 G/17G
1 POWDER, FOR SOLUTION ORAL DAILY
Qty: 510 G | Refills: 2 | Status: SHIPPED | OUTPATIENT
Start: 2024-07-12

## 2024-07-12 RX ORDER — SENNOSIDES A AND B 8.6 MG/1
1 TABLET, FILM COATED ORAL 2 TIMES DAILY PRN
Qty: 120 TABLET | Refills: 1 | Status: SHIPPED | OUTPATIENT
Start: 2024-07-12

## 2024-07-12 RX ORDER — DULOXETIN HYDROCHLORIDE 30 MG/1
30 CAPSULE, DELAYED RELEASE ORAL 2 TIMES DAILY
Qty: 180 CAPSULE | Refills: 1 | Status: SHIPPED | OUTPATIENT
Start: 2024-07-12 | End: 2024-08-12

## 2024-07-12 ASSESSMENT — PATIENT HEALTH QUESTIONNAIRE - PHQ9
10. IF YOU CHECKED OFF ANY PROBLEMS, HOW DIFFICULT HAVE THESE PROBLEMS MADE IT FOR YOU TO DO YOUR WORK, TAKE CARE OF THINGS AT HOME, OR GET ALONG WITH OTHER PEOPLE: SOMEWHAT DIFFICULT
SUM OF ALL RESPONSES TO PHQ QUESTIONS 1-9: 6
SUM OF ALL RESPONSES TO PHQ QUESTIONS 1-9: 6

## 2024-07-12 NOTE — PROGRESS NOTES
Yo is a 44 year old who is being evaluated via a billable video visit.        Yo was seen today for office visit.    Diagnoses and all orders for this visit:    Moderate episode of recurrent major depressive disorder (H)  GARDENIA (generalized anxiety disorder)  Comments:  Chronic, improved with duloxetine.  Will continue.  Patient reports that he was only taking duloxetine 30 mg daily, discussed that this should be 30 mg twice daily.  Patient was agreeable.    Class 3 severe obesity without serious comorbidity with body mass index (BMI) of 40.0 to 44.9 in adult, unspecified obesity type (H)  Constipation, unspecified constipation type  Has noticed some weight loss.  Having side effect of constipation.  This is concerning giving his history of ventral hernia in need of repair.  Discussed using senna and MiraLAX for constipation.  Will keep same dose of Wegovy for now.  Can increase if side effect of constipation resolved.  Patient was agreeable.  -     Semaglutide-Weight Management (WEGOVY) 0.5 MG/0.5ML pen; Inject 0.5 mg subcutaneously once a week  -     senna (SENOKOT) 8.6 MG tablet; Take 1 tablet by mouth 2 times daily as needed for constipation  -     polyethylene glycol (MIRALAX) 17 GM/Dose powder; Take 17 g (1 Capful) by mouth daily    S/P repair of ventral hernia  Ventral hernia without obstruction or gangrene  Follows with surgeon in [place].  Reports that surgery has been delayed until October for patient to lose weight.  Will need to hold Wegovy at least a week prior to surgery.  Currently stable.  Denies abdominal pain.    Chronic bilateral low back pain without sciatica  Comments:  Saw pain management, recommend neuropathic muscle relaxants, PT, pool therapy.  Pain has improved with starting duloxetine.  Will continue, increase to twice daily as noted above as patient was taking it daily.  Orders:  -     DULoxetine (CYMBALTA) 30 MG capsule; Take 1 capsule (30 mg) by mouth 2 times daily      The  longitudinal plan of care for the diagnosis(es)/condition(s) as documented were addressed during this visit. Due to the added complexity in care, I will continue to support Yo in the subsequent management and with ongoing continuity of care.      Zechariah Ram is a 44 year old, presenting for the following health issues:  office visit (Follow up on depression)        7/12/2024    10:16 AM   Additional Questions   Roomed by    Accompanied by self     Video Start Time: 10:21 AM    History of Present Illness       Reason for visit:  Follow up on depression    He eats 2-3 servings of fruits and vegetables daily.He consumes 1 sweetened beverage(s) daily.He exercises with enough effort to increase his heart rate 20 to 29 minutes per day.  He exercises with enough effort to increase his heart rate 3 or less days per week.   He is taking medications regularly.         5/30/2024    11:35 AM 7/10/2024    10:06 AM 7/12/2024    10:15 AM   PHQ   PHQ-9 Total Score 9 6 6   Q9: Thoughts of better off dead/self-harm past 2 weeks Not at all Not at all Not at all         3/5/2024     9:25 AM 3/19/2024     1:10 PM   GARDENIA-7 SCORE   Total Score 7 (mild anxiety) 4 (minimal anxiety)   Total Score 7 4   On duloxetine 30mg daily, discussed that should be BID.     Obesity  On Wegovy, has lost a couple pounds, less hungry  Surgery for hernia pushed until October.  Some constipation  Been taking colace and miralax            Objective           Vitals:  No vitals were obtained today due to virtual visit.    Physical Exam   GENERAL: alert and no distress  EYES: Eyes grossly normal to inspection.  No discharge or erythema, or obvious scleral/conjunctival abnormalities.  RESP: No audible wheeze, cough, or visible cyanosis.    SKIN: Visible skin clear. No significant rash, abnormal pigmentation or lesions.  NEURO: Cranial nerves grossly intact.  Mentation and speech appropriate for age.  PSYCH: Appropriate affect, tone, and pace of  words          Video-Visit Details    Type of service:  Video Visit   Video End Time: 10:30 AM  Originating Location (pt. Location): Home    Distant Location (provider location):  On-site  Platform used for Video Visit: Lei  Signed Electronically by: Cornelia Payne MD

## 2024-07-12 NOTE — TELEPHONE ENCOUNTER
Attempted to call pt to schedule a follow up appointment with  in 4 weeks. LVM #1. Please help schedule when pt call back.

## 2024-07-15 NOTE — TELEPHONE ENCOUNTER
8/8/2024 10:00 AM OFFICE VISIT 30 min [facility]/KALEN Payne Mai, MD   Location Instructions:    [facility] is located at [address] [place] [address] in [place], at the intersection of [facility]. This is one block south of the [facility]. Free parking is available in the lot directly north of the clinic across [facility]. The clinic is near stops along bus [address] and [address].

## 2024-08-08 ENCOUNTER — TELEPHONE (OUTPATIENT)
Dept: FAMILY MEDICINE | Facility: CLINIC | Age: 44
End: 2024-08-08

## 2024-08-08 ENCOUNTER — OFFICE VISIT (OUTPATIENT)
Dept: FAMILY MEDICINE | Facility: CLINIC | Age: 44
End: 2024-08-08
Payer: COMMERCIAL

## 2024-08-08 VITALS
BODY MASS INDEX: 42.36 KG/M2 | HEART RATE: 79 BPM | SYSTOLIC BLOOD PRESSURE: 125 MMHG | OXYGEN SATURATION: 99 % | HEIGHT: 69 IN | RESPIRATION RATE: 16 BRPM | DIASTOLIC BLOOD PRESSURE: 77 MMHG | TEMPERATURE: 97.3 F | WEIGHT: 286 LBS

## 2024-08-08 DIAGNOSIS — K59.00 CONSTIPATION, UNSPECIFIED CONSTIPATION TYPE: Primary | ICD-10-CM

## 2024-08-08 DIAGNOSIS — E66.813 CLASS 3 SEVERE OBESITY WITHOUT SERIOUS COMORBIDITY WITH BODY MASS INDEX (BMI) OF 40.0 TO 44.9 IN ADULT, UNSPECIFIED OBESITY TYPE (H): ICD-10-CM

## 2024-08-08 DIAGNOSIS — E66.01 CLASS 3 SEVERE OBESITY WITHOUT SERIOUS COMORBIDITY WITH BODY MASS INDEX (BMI) OF 40.0 TO 44.9 IN ADULT, UNSPECIFIED OBESITY TYPE (H): Primary | ICD-10-CM

## 2024-08-08 DIAGNOSIS — E66.01 CLASS 3 SEVERE OBESITY WITHOUT SERIOUS COMORBIDITY WITH BODY MASS INDEX (BMI) OF 40.0 TO 44.9 IN ADULT, UNSPECIFIED OBESITY TYPE (H): ICD-10-CM

## 2024-08-08 DIAGNOSIS — G89.29 CHRONIC BILATERAL LOW BACK PAIN WITHOUT SCIATICA: ICD-10-CM

## 2024-08-08 DIAGNOSIS — E66.813 CLASS 3 SEVERE OBESITY WITHOUT SERIOUS COMORBIDITY WITH BODY MASS INDEX (BMI) OF 40.0 TO 44.9 IN ADULT, UNSPECIFIED OBESITY TYPE (H): Primary | ICD-10-CM

## 2024-08-08 DIAGNOSIS — M54.50 CHRONIC BILATERAL LOW BACK PAIN WITHOUT SCIATICA: ICD-10-CM

## 2024-08-08 DIAGNOSIS — F33.1 MODERATE EPISODE OF RECURRENT MAJOR DEPRESSIVE DISORDER (H): ICD-10-CM

## 2024-08-08 PROBLEM — K43.9 VENTRAL HERNIA WITHOUT OBSTRUCTION OR GANGRENE: Status: RESOLVED | Noted: 2024-02-12 | Resolved: 2024-08-08

## 2024-08-08 PROBLEM — Z00.00 HEALTHCARE MAINTENANCE: Status: RESOLVED | Noted: 2024-07-10 | Resolved: 2024-08-08

## 2024-08-08 PROCEDURE — G2211 COMPLEX E/M VISIT ADD ON: HCPCS | Performed by: STUDENT IN AN ORGANIZED HEALTH CARE EDUCATION/TRAINING PROGRAM

## 2024-08-08 PROCEDURE — 99214 OFFICE O/P EST MOD 30 MIN: CPT | Performed by: STUDENT IN AN ORGANIZED HEALTH CARE EDUCATION/TRAINING PROGRAM

## 2024-08-08 RX ORDER — BUPROPION HYDROCHLORIDE 300 MG/1
300 TABLET ORAL EVERY MORNING
Qty: 90 TABLET | Refills: 1 | Status: CANCELLED | OUTPATIENT
Start: 2024-08-08

## 2024-08-08 RX ORDER — DULOXETIN HYDROCHLORIDE 30 MG/1
30 CAPSULE, DELAYED RELEASE ORAL 2 TIMES DAILY
Qty: 180 CAPSULE | Refills: 1 | Status: CANCELLED | OUTPATIENT
Start: 2024-08-08

## 2024-08-08 ASSESSMENT — ANXIETY QUESTIONNAIRES
7. FEELING AFRAID AS IF SOMETHING AWFUL MIGHT HAPPEN: NOT AT ALL
3. WORRYING TOO MUCH ABOUT DIFFERENT THINGS: SEVERAL DAYS
GAD7 TOTAL SCORE: 2
4. TROUBLE RELAXING: SEVERAL DAYS
GAD7 TOTAL SCORE: 2
8. IF YOU CHECKED OFF ANY PROBLEMS, HOW DIFFICULT HAVE THESE MADE IT FOR YOU TO DO YOUR WORK, TAKE CARE OF THINGS AT HOME, OR GET ALONG WITH OTHER PEOPLE?: SOMEWHAT DIFFICULT
5. BEING SO RESTLESS THAT IT IS HARD TO SIT STILL: NOT AT ALL
6. BECOMING EASILY ANNOYED OR IRRITABLE: NOT AT ALL
GAD7 TOTAL SCORE: 2
IF YOU CHECKED OFF ANY PROBLEMS ON THIS QUESTIONNAIRE, HOW DIFFICULT HAVE THESE PROBLEMS MADE IT FOR YOU TO DO YOUR WORK, TAKE CARE OF THINGS AT HOME, OR GET ALONG WITH OTHER PEOPLE: SOMEWHAT DIFFICULT
1. FEELING NERVOUS, ANXIOUS, OR ON EDGE: NOT AT ALL
2. NOT BEING ABLE TO STOP OR CONTROL WORRYING: NOT AT ALL
7. FEELING AFRAID AS IF SOMETHING AWFUL MIGHT HAPPEN: NOT AT ALL

## 2024-08-08 ASSESSMENT — PATIENT HEALTH QUESTIONNAIRE - PHQ9: SUM OF ALL RESPONSES TO PHQ QUESTIONS 1-9: 3

## 2024-08-08 NOTE — PROGRESS NOTES
Moderate episode of recurrent major depressive disorder (H)  GARDENIA (generalized anxiety disorder)  Comments:  Chronic, improved with duloxetine.  Will continue.  Patient reports that he was only taking duloxetine 30 mg daily, discussed that this should be 30 mg twice daily.  Patient was agreeable.     Class 3 severe obesity without serious comorbidity with body mass index (BMI) of 40.0 to 44.9 in adult, unspecified obesity type (H)  Constipation, unspecified constipation type  Has noticed some weight loss.  Having side effect of constipation.  This is concerning giving his history of ventral hernia in need of repair.  Discussed using senna and MiraLAX for constipation.  Will keep same dose of Wegovy for now.  Can increase if side effect of constipation resolved.  Patient was agreeable.  -     Semaglutide-Weight Management (WEGOVY) 0.5 MG/0.5ML pen; Inject 0.5 mg subcutaneously once a week  -     senna (SENOKOT) 8.6 MG tablet; Take 1 tablet by mouth 2 times daily as needed for constipation  -     polyethylene glycol (MIRALAX) 17 GM/Dose powder; Take 17 g (1 Capful) by mouth daily     S/P repair of ventral hernia  Ventral hernia without obstruction or gangrene  Follows with surgeon in [place].  Reports that surgery has been delayed until October for patient to lose weight.  Will need to hold Wegovy at least a week prior to surgery.  Currently stable.  Denies abdominal pain.     Chronic bilateral low back pain without sciatica  Comments:  Saw pain management, recommend neuropathic muscle relaxants, PT, pool therapy.  Pain has improved with starting duloxetine.  Will continue, increase to twice daily as noted above as patient was taking it daily.  Orders:  -     DULoxetine (CYMBALTA) 30 MG capsule; Take 1 capsule (30 mg) by mouth 2 times daily

## 2024-08-08 NOTE — PROGRESS NOTES
Yo was seen today for office visit.    Diagnoses and all orders for this visit:    Constipation, unspecified constipation type  Chronic, likely multifactorial.  Patient has always had constipation, now worsened. Patient was seen in outside urgent care 7/28/2024 for abdominal pain.  Concern for diverticulitis.  Labs drawn, results not able to be reviewed.  Patient declined CT. denies abdominal pain today.  Benign abdominal exam today.  Concerned that semaglutide may be contributing to this.  Discussed that we will keep dose the same for now, although patient does want increased dose.  Patient has not been using MiraLAX or senna consistently.    - Discussed using MiraLAX daily as needed and senna if MiraLAX is not enough.  Also discussed lifestyle changes, such as increasing fruits and vegetables, fluid intake, decreasing processed food, and regular physical activity.  Patient was agreeable.  -Follow-up with surgeon regarding hernia  -Go to ED if develop severe abdominal pain.    Class 3 severe obesity without serious comorbidity with body mass index (BMI) of 40.0 to 44.9 in adult, unspecified obesity type (H)  Comments:  Patient has not lost weight.  Wants to go up on Wegovy, however concerned that this may be contributing to constipation.  Will stay on the same dose.  Patient will message in 2 weeks if constipation improved, can increase to 1 mg weekly.  -     Semaglutide-Weight Management (WEGOVY) 0.5 MG/0.5ML pen; Inject 0.5 mg subcutaneously once a week    Moderate episode of recurrent major depressive disorder (H)  GARDENIA  Comments:  Chronic, not controlled.  Continue duloxetine and Wellbutrin.    Chronic bilateral low back pain without sciatica  Comments:  Saw pain management, recommend neuropathic muscle relaxants, PT, pool therapy.  Duloxetine seems to be helping.  Continue.      The longitudinal plan of care for the diagnosis(es)/condition(s) as documented were addressed during this visit. Due to the  added complexity in care, I will continue to support Yo in the subsequent management and with ongoing continuity of care.        Subjective   Yo is a 44 year old, presenting for the following health issues:  office visit (Follow up)        8/8/2024    10:16 AM   Additional Questions   Roomed by    Accompanied by self     History of Present Illness       Back Pain:  He presents for follow up of back pain. Patient's back pain is a chronic problem.  Location of back pain:  Right lower back, left lower back, right buttock and left buttock  Description of back pain: cramping, dull ache, gnawing and sharp  Back pain spreads: nowhere    Since patient first noticed back pain, pain is: always present, but gets better and worse  Does back pain interfere with his job:  Yes       Mental Health Follow-up:  Patient presents to follow-up on Depression & Anxiety.Patient's depression since last visit has been:  Better  The patient is having other symptoms associated with depression.  Patient's anxiety since last visit has been:  Better  The patient is not having other symptoms associated with anxiety.  Any significant life events: financial concerns, housing concerns and health concerns  Patient is not feeling anxious or having panic attacks.  Patient has no concerns about alcohol or drug use.    Reason for visit:  Follow up for new medication    He eats 0-1 servings of fruits and vegetables daily.He consumes 1 sweetened beverage(s) daily.He exercises with enough effort to increase his heart rate 10 to 19 minutes per day.  He exercises with enough effort to increase his heart rate 5 days per week.   He is taking medications regularly.      Patient was seen in outside urgent care 7/28/2024 for abdominal pain.  Concern for diverticulitis.  Labs drawn, results not able to be reviewed.  Patient declined CT.    Wt Readings from Last 4 Encounters:   08/08/24 129.7 kg (286 lb)   07/10/24 130.6 kg (288 lb)   05/13/24 127.9 kg (282  "lb)   03/19/24 122.9 kg (271 lb)         Moderate episode of recurrent major depressive disorder (H)  GARDENIA (generalized anxiety disorder)  Comments:      3/5/2024     9:25 AM 3/19/2024     1:10 PM 8/8/2024    10:21 AM   GARDENIA-7 SCORE   Total Score 7 (mild anxiety) 4 (minimal anxiety) 2 (minimal anxiety)   Total Score 7 4 2           7/10/2024    10:06 AM 7/12/2024    10:15 AM 8/8/2024    10:23 AM   PHQ   PHQ-9 Total Score 6 6 3   Q9: Thoughts of better off dead/self-harm past 2 weeks Not at all Not at all Not at all         Objective    /77 (BP Location: Left arm, Patient Position: Sitting, Cuff Size: Adult Large)   Pulse 79   Temp 97.3  F (36.3  C) (Temporal)   Resp 16   Ht 1.753 m (5' 9.02\")   Wt 129.7 kg (286 lb)   SpO2 99%   BMI 42.22 kg/m    Body mass index is 42.22 kg/m .  Physical Exam   General: Well developed, well nourished.  Skin:  Dry without rash.    Head:  Normocephalic-atraumatic.    Eye:  Normal conjunctivae.     Respiratory:  Normal respiratory effort.   Gastrointestinal: Soft, nontender.  Bowel sounds in all 4 quadrant.  Right incisional ventral hernia stable, not reducible, nontender.  Musculoskeletal:  No deformity or edema.  Neurologic: No focal deficits.            Signed Electronically by: Cornelia Payne MD    Prior to immunization administration, verified patients identity using patient s name and date of birth. Please see Immunization Activity for additional information.     Screening Questionnaire for Adult Immunization    Are you sick today?   No   Do you have allergies to medications, food, a vaccine component or latex?   No   Have you ever had a serious reaction after receiving a vaccination?   No   Do you have a long-term health problem with heart, lung, kidney, or metabolic disease (e.g., diabetes), asthma, a blood disorder, no spleen, complement component deficiency, a cochlear implant, or a spinal fluid leak?  Are you on long-term aspirin therapy?   No   Do you have cancer, " leukemia, HIV/AIDS, or any other immune system problem?   No   Do you have a parent, brother, or sister with an immune system problem?   No   In the past 3 months, have you taken medications that affect  your immune system, such as prednisone, other steroids, or anticancer drugs; drugs for the treatment of rheumatoid arthritis, Crohn s disease, or psoriasis; or have you had radiation treatments?   No   Have you had a seizure, or a brain or other nervous system problem?   No   During the past year, have you received a transfusion of blood or blood    products, or been given immune (gamma) globulin or antiviral drug?   No   For women: Are you pregnant or is there a chance you could become       pregnant during the next month?   No   Have you received any vaccinations in the past 4 weeks?   No     Immunization questionnaire answers were all negative.      Patient instructed to remain in clinic for 15 minutes afterwards, and to report any adverse reactions.     Screening performed by Lakhwinder Garcia MA on 8/8/2024 at 10:18 AM.

## 2024-08-12 DIAGNOSIS — M54.50 CHRONIC BILATERAL LOW BACK PAIN WITHOUT SCIATICA: ICD-10-CM

## 2024-08-12 DIAGNOSIS — G89.29 CHRONIC BILATERAL LOW BACK PAIN WITHOUT SCIATICA: ICD-10-CM

## 2024-08-12 RX ORDER — DULOXETIN HYDROCHLORIDE 30 MG/1
30 CAPSULE, DELAYED RELEASE ORAL 2 TIMES DAILY
Qty: 180 CAPSULE | Refills: 1 | Status: CANCELLED | OUTPATIENT
Start: 2024-08-12

## 2024-08-12 RX ORDER — DULOXETIN HYDROCHLORIDE 30 MG/1
30 CAPSULE, DELAYED RELEASE ORAL 2 TIMES DAILY
Qty: 180 CAPSULE | Refills: 0 | Status: SHIPPED | OUTPATIENT
Start: 2024-08-12

## 2024-08-12 NOTE — TELEPHONE ENCOUNTER
Per call from insurance, they allow the patient to fill the 0.5 mg dose twice before he needs to increase per prescribing guidelines to the 1 mg dose.  He has already received 2 fills of the 0.5 mg dose.  They will not cover any additional medication at this strength he needs to move to the next step of his taper schedule to the 1 mg dose.

## 2024-08-12 NOTE — TELEPHONE ENCOUNTER
Patient would like to know if he can request 1.0 MG of Wegovy. States his constipation has resolved and would like to increase the medication to help with weight.

## 2024-08-12 NOTE — TELEPHONE ENCOUNTER
Central Prior Authorization Team   Phone: [phone]    PA Initiation    Medication: Semaglutide-Weight Management (WEGOVY) 0.5 MG/0.5ML pen   Insurance Company: [employer] - Phone [phone] Fax [phone]  Pharmacy Filling the Rx: [id number] - [place], [place] - [facility] [address] AT [facility] [address]  Filling Pharmacy Phone: [phone]  Filling Pharmacy Fax:    Start Date: 8/12/2024

## 2024-08-12 NOTE — TELEPHONE ENCOUNTER
Medication Question or Refill    Contacts       Contact Date/Time Type Contact Phone/Fax    08/12/2024 03:08 PM CDT Phone (Incoming) Yo East (Self) [phone] (M)          What medication are you calling about (include dose and sig)?: duloxetine 30 MG     Preferred Pharmacy:  [address] [address]  Phone: [phone] Fax: [phone]    Controlled Substance Agreement on file:   CSA -- Patient Level:    CSA: None found at the patient level.       Who prescribed the medication?: Dr. Payne    Do you need a refill? Yes    When did you use the medication last? Yesterday, is out of medication as of today    Patient offered an appointment? No    Do you have any questions or concerns?  No    Okay to leave a detailed message?: Yes at Cell number on file:    Telephone Information:   Mobile [phone]

## 2024-08-26 DIAGNOSIS — E66.01 CLASS 3 SEVERE OBESITY WITHOUT SERIOUS COMORBIDITY WITH BODY MASS INDEX (BMI) OF 40.0 TO 44.9 IN ADULT, UNSPECIFIED OBESITY TYPE (H): ICD-10-CM

## 2024-08-26 DIAGNOSIS — E66.813 CLASS 3 SEVERE OBESITY WITHOUT SERIOUS COMORBIDITY WITH BODY MASS INDEX (BMI) OF 40.0 TO 44.9 IN ADULT, UNSPECIFIED OBESITY TYPE (H): ICD-10-CM

## 2024-08-27 ENCOUNTER — TELEPHONE (OUTPATIENT)
Dept: FAMILY MEDICINE | Facility: CLINIC | Age: 44
End: 2024-08-27
Payer: COMMERCIAL

## 2024-08-27 DIAGNOSIS — E66.813 CLASS 3 SEVERE OBESITY WITHOUT SERIOUS COMORBIDITY WITH BODY MASS INDEX (BMI) OF 40.0 TO 44.9 IN ADULT, UNSPECIFIED OBESITY TYPE (H): ICD-10-CM

## 2024-08-27 DIAGNOSIS — E66.01 CLASS 3 SEVERE OBESITY WITHOUT SERIOUS COMORBIDITY WITH BODY MASS INDEX (BMI) OF 40.0 TO 44.9 IN ADULT, UNSPECIFIED OBESITY TYPE (H): ICD-10-CM

## 2024-08-27 RX ORDER — SEMAGLUTIDE 0.5 MG/.5ML
INJECTION, SOLUTION SUBCUTANEOUS
Qty: 2 ML | OUTPATIENT
Start: 2024-08-27

## 2024-08-27 NOTE — TELEPHONE ENCOUNTER
Medication Question or Refill    Contacts       Contact Date/Time Type Contact Phone/Fax    08/27/2024 01:27 PM CDT Phone (Incoming) CruzitoYo (Self) [phone] (M)            What medication are you calling about (include dose and sig)?:     Semaglutide, 1 MG/DOSE, (OZEMPIC) 4 MG/3ML pe       Preferred Pharmacy:   WRITTEN PRESCRIPTION REQUESTED  No address on file        [id number] - [place], [place] - [address] [facility] [address]  [facility] [address] [place] [address]  Phone: [phone] Fax: [phone]      Controlled Substance Agreement on file:   CSA -- Patient Level:    CSA: None found at the patient level.       Who prescribed the medication?: pcp    Do you need a refill? Yes.    THE PROVIDER SENT IN THE WRONG DOSAGE , PATIENT NEEDS 1MG INSTEAD OF THE 7/6/24 MG THAT WAS ORDERED.PATIENT IS TO START THIS MEDICATION TODAY     When did you use the medication last? LAST TUESDAY    Patient offered an appointment? No    Do you have any questions or concerns?  No      Okay to leave a detailed message?: Yes at Home number on file [phone] (home)

## 2024-08-28 NOTE — TELEPHONE ENCOUNTER
Clinic Team-Please inform patient Semaglutide with instructions for 1 mg per dose was ordered on 8/27/24.      Thank you!  GEORGE HitchcockN, RN-[facility], [facility]

## 2024-08-29 ENCOUNTER — TELEPHONE (OUTPATIENT)
Dept: FAMILY MEDICINE | Facility: CLINIC | Age: 44
End: 2024-08-29

## 2024-08-29 NOTE — TELEPHONE ENCOUNTER
Central Prior Authorization Team   Phone: [phone]    PA Initiation    Medication: Ozempic (1 MG/DOSE) 4MG/3ML pen-injectors  Insurance Company: [employer] [place] - Phone [phone] Fax [phone]  Pharmacy Filling the Rx: [facility][id number] - [place], [place] - [address] AT [facility] [address]  Filling Pharmacy Phone: [phone]  Filling Pharmacy Fax:    Start Date: 8/29/2024

## 2024-08-30 NOTE — TELEPHONE ENCOUNTER
PRIOR AUTHORIZATION DENIED    Medication: Ozempic (1 MG/DOSE) 4MG/3ML pen-injectors    Denial Date: 8/30/2024    Denial Rational: Medication is only covered if being prescribed for Type 2 Diabetes.  It is not covered for any other diagnosis.      Message from Plan  Product not covered for this health plan/disease state/medication as submitted. Product not covered by this plan for this diagnosis. For a FDA label approved diagnosis, please resubmit with an ICD 10 code or submit via other methods.

## 2024-09-03 ENCOUNTER — TELEPHONE (OUTPATIENT)
Dept: FAMILY MEDICINE | Facility: CLINIC | Age: 44
End: 2024-09-03
Payer: COMMERCIAL

## 2024-09-03 DIAGNOSIS — E66.813 CLASS 3 SEVERE OBESITY WITHOUT SERIOUS COMORBIDITY WITH BODY MASS INDEX (BMI) OF 40.0 TO 44.9 IN ADULT, UNSPECIFIED OBESITY TYPE (H): Primary | ICD-10-CM

## 2024-09-03 DIAGNOSIS — E66.01 CLASS 3 SEVERE OBESITY WITHOUT SERIOUS COMORBIDITY WITH BODY MASS INDEX (BMI) OF 40.0 TO 44.9 IN ADULT, UNSPECIFIED OBESITY TYPE (H): Primary | ICD-10-CM

## 2024-09-03 NOTE — TELEPHONE ENCOUNTER
RN attempted to contact patient, but no answer. Left detailed message on patient's voicemail that Rx was refilled at [facility]    Charu Jacobsen RN  [facility]     Disp Refills Start End SRUTHI   Semaglutide, 1 MG/DOSE, (OZEMPIC) 4 MG/3ML pen 3 mL 0 8/27/2024 -- No   Sig - Route: Inject 1 mg subcutaneously every 7 days. - Subcutaneous   Sent to pharmacy as: Semaglutide (1 MG/DOSE) 4 MG/3ML Subcutaneous Solution Pen-injector (OZEMPIC)   Class: E-Prescribe     [facility][id number] - [place], [place] - [address] AT [facility] [address]       Per call from insurance, they allow the patient to fill the 0.5 mg dose twice before he needs to increase per prescribing guidelines to the 1 mg dose.  He has already received 2 fills of the 0.5 mg dose.  They will not cover any additional medication at this strength he needs to move to the next step of his taper schedule to the 1 mg dose.

## 2024-09-03 NOTE — TELEPHONE ENCOUNTER
New Medication Request    Contacts       Contact Date/Time Type Contact Phone/Fax    09/03/2024 10:02 AM CDT Phone (Incoming) Yo East (Self) [phone] (M)        What medication are you requesting?: 1 MG Dose of WEGOVY    Reason for medication request: patient has taken the wegovy before for 2 months, but an ozempic prescription was written in error    Have you taken this medication before?: Yes: patient has been on wegovy for 2 months. Patient insurance according to patient will cover it for the year    Controlled Substance Agreement on file:   CSA -- Patient Level:    CSA: None found at the patient level.         Patient offered an appointment? No    Preferred Pharmacy:      [facility][id number] - [place], [place] - [address] AT [facility] [address]  [facility] [address]  [place] [address]  Phone: [phone] Fax: [phone]      Okay to leave a detailed message?: Yes at Home number on file [phone] (home)

## 2024-09-03 NOTE — TELEPHONE ENCOUNTER
Medication Question or Refill        What medication are you calling about (include dose and sig)?: WEGOVY, NOT Ozempic.    Patient reported medication should be covered under insurance plan now.    Preferred Pharmacy:   WRITTEN PRESCRIPTION REQUESTED  No address on file     [facility] #[id number] - [place], [place] - [address] [facility] [address]  [address]  [place] [address]  Phone: [phone] Fax: [phone]      Controlled Substance Agreement on file:   CSA -- Patient Level:    CSA: None found at the patient level.       Who prescribed the medication?: Dr. Payne    Do you need a refill? Yes    When did you use the medication last? Last Tuesday, 8/27/24    Patient offered an appointment? No    Do you have any questions or concerns?  No      Okay to leave a detailed message?: Yes at Cell number on file:    Telephone Information:   Mobile [phone]

## 2024-09-04 ENCOUNTER — HOSPITAL ENCOUNTER (OUTPATIENT)
Dept: NUTRITION | Facility: CLINIC | Age: 44
Discharge: HOME | End: 2024-09-04
Admitting: STUDENT IN AN ORGANIZED HEALTH CARE EDUCATION/TRAINING PROGRAM
Payer: COMMERCIAL

## 2024-09-04 PROCEDURE — 97803 MED NUTRITION INDIV SUBSEQ: CPT | Mod: GT,95 | Performed by: DIETITIAN, REGISTERED

## 2024-09-04 NOTE — PROGRESS NOTES
"[facility]  Medical Nutrition Therapy     Visit Type: Re-Assessment     Yo East, 44 year old referred by Cornelia Payne MD for MNT related to E66.01, Z68.41 (ICD-10-CM) - Class 3 severe obesity without serious comorbidity with body mass index (BMI) of 40.0 to 44.9 in adult, unspecified obesity type (H)      Virtual Visit Details     Type of service:  Video Visit   Video Start Time:  9:38  Video End Time: 9:59    Originating Location (pt. Location): Home     Distant Location (provider location):  On-site  Platform used for Video Visit: [employer]         Nutrition Assessment:  Anthropometrics  Height:       Ht Readings from Last 1 Encounters:   05/13/24 1.753 m (5' 9\")           BMI:  42.4   Weight Status:  Obesity Grade III BMI >40   Weight:       Wt Readings from Last 1 Encounters:   08/8/24 129.7 kg (286 lb)        UBW: 266 lb       IBW:  73 kg (160 lb) IBW %: 179%         Weight History:   Wt Readings from Last 10 Encounters:   08/08/24 129.7 kg (286 lb)   07/10/24 130.6 kg (288 lb)   05/13/24 127.9 kg (282 lb)   03/19/24 122.9 kg (271 lb)   03/05/24 124.3 kg (274 lb)   02/16/24 124.3 kg (274 lb 1.6 oz)   02/12/24 120.7 kg (266 lb)   05/21/20 107 kg (236 lb)   03/16/20 103.4 kg (228 lb)   03/16/20 103.4 kg (228 lb)     -Wt loss of 2 lb over the past 1 month.   -Wt at home is 175-180 lb.      Goal Weight:   220 lb - personal goal      Nutrition History     PMH:       Patient Active Problem List   Diagnosis    S/P repair of ventral hernia    Mild intermittent asthma without complication    Class 3 severe obesity without serious comorbidity with body mass index (BMI) of 40.0 to 44.9 in adult, unspecified obesity type (H)    Ventral hernia without obstruction or gangrene    History of ADHD    History of depression    Excessive sleepiness    Chronic bilateral low back pain without sciatica    Hypertriglyceridemia    Carpal tunnel syndrome of left wrist    HTN (hypertension)    PTSD (post-traumatic " stress disorder)    Recurrent ventral incisional hernia    Varicose veins of right lower extremity, unspecified whether complicated      Last visit w/patient was on 24. Nutrition goals set at that time were the followin) Increase exercise - aim for 15 min of elliptical machine and do 15 min of free weights per day   -Was exercising for a while but then stopped. Felt better after exercising though.   2) Track calories and aim for 3372-7748 calories per day using My Fitness Pal or Cronometer   -Tracked for 1.5 weeks but then stopped. Intake was 1130-5594 calories per day.   3) Add more veggies to the diet - aim for 2.5 cups of veggies per day   -Eating 1 cup of veggies per day. Eats   4) Instead of eating ice cream, eat yogurt with fruit (Chobani Zero, Oikos Triple Zero, Oikos Pro, or Two Good)   -Did this for a while but then stopped.   5) Work on meal planning - create a weekly menu, make a grocery list, and grocery shop once per week   -Patient has been eating more fast food lately and not meal prepping.       -Patient lost 10 lb but then gained it back.   -Surgery will be in October.   -Patient feels that he's eating better and able to work more hours than he used to.   -He stopped drinking soda for a while but started drinking it again.   -Eating more veggies, no candy.         Labs:       Recent Labs   Lab Test 24  1220   CHOL 162   HDL 30*   TRIG 418*         Meds:   Current Outpatient Medications   Medication Instructions    acetaminophen (TYLENOL) 500-1,000 mg, Oral, EVERY 8 HOURS PRN    buPROPion (WELLBUTRIN XL) 300 mg, Oral, EVERY MORNING    cyclobenzaprine (FLEXERIL) 5 mg, Oral, 3 TIMES DAILY PRN    DULoxetine (CYMBALTA) 30 mg, Oral, 2 TIMES DAILY    ibuprofen (ADVIL/MOTRIN) 600 mg, Oral, EVERY 6 HOURS PRN    OLANZapine (ZYPREXA) 10 mg, Oral, AT BEDTIME    omeprazole (PRILOSEC) 20 mg, Oral, DAILY    polyethylene glycol (MIRALAX) 17 GM/Dose powder 1 Capful, Oral, DAILY    Semaglutide (1  MG/DOSE) (OZEMPIC) 1 mg, Subcutaneous, EVERY 7 DAYS    senna (SENOKOT) 8.6 MG tablet 1 tablet, Oral, 2 TIMES DAILY PRN    VENTOLIN  (90 Base) MCG/ACT inhaler       -Hasn't been able to start Wegovy yet due to insurance issues     Supplements: reviewed        Social/Environmental:   -average sleep per night: not discussed  -level of stress: not discussed         Food Record  Breakfast: 3-4 days per week eats breakfast: bowl of cereal such as Raisin Bran 2 cups with 1% milk OR 2 chorizo burritos (4 eggs, chorizo sausage) OR honeydew, 8 oz coffee  Lunch: Lay's potato chips small bag and other vending machine snacks   Dinner: 7-8:00 pm: salad with pickles OR 16 oz steak OR pork tenderloin with frozen veggies and a dinner salad OR burger and chips and fries and sweet potatoes   Snacks: Ice cream every other day - 2 servings   Beverages: 2 Diet Mt. Dews per day, water (16-24 oz), no juice anymore - used to drink a lot, no sports drinks, energy drinks not anymore  -Take-out more often lately burger and fries, coleslaw with Dr. Pepper (finishes the whole meal)  -Stopped drinking soda   -Eats ice cream   -No butter used in cooking, uses canola oil  -Salt used in cooking sometimes     Nutritional Details:   -Food allergies: none  -Food intolerances: none   -Food sensitivities: none  -GI concerns:  none  -Appetite: good   -Pace of eating: fast  -Role of cooking: self   -Role of food shopping: self         Physical Activity:  Was riding his bike to work but quit doing that.  Plays [employer] golf      ASSESSED MALNUTRITION STATUS  % Weight Loss:  None noted  % Intake:  No decreased intake noted  Subcutaneous Fat Loss:  None observed  Loss of Muscle Mass:  None observed  Fluid Retention:  None noted     Malnutrition Diagnosis:  Patient does not meet two of the above criteria necessary for diagnosing malnutrition           Nutrition Diagnosis:  Excessive energy intake related to food and nutrition knowledge deficit as  evidenced by usual dietary intake exceeding energy needs by and estimated 125%, BMI 41.7, low daily physical activity, and report of not knowing nutrition for weight loss.          Nutrition Intervention:  Nutrition Prescription Summary: MNT for Weight Management       Nutrition Education (Content):  -Discussed goals set at last visit and barriers towards reaching them  -Discussed meal prepping strategies  -Educated patient on lunch ideas to take with to work  -Encouraged patient to eat less fast food  -Encourage patient to get back into exercising and discussed the health benefits of movement     Emailed/mailed information discussed including nutrition handouts to patient.         Nutrition Prescription: Macronutrient and Micronutrient details   Dosing weight: Current wt (128 kg) for energy and fluids, IBW (73 kg) for protein  Energy: 0708-4299 kcals/day ([place] St Jeor)     Justification:  (obesity, to promote a 2 lb wt loss per week)    Protein: 73-88 g Pro/day (1-1.2 g pro/Kg)     Justification:  (obesity guidelines )    Fluid: 7027-6982 mL/day   (1 mL/Kcal)     Justification:  (obese)   Fiber:     Men (19-50 years): 38 grams per day          Carbohydrate: 45-65% kcal   <25 g added sugar/day       Fat: 20-35% kcal  <7% kcal from saturated fat   Micronutrient: DRI  <2,300 mg sodium/day                Vitamin and Mineral Supplements: n/a        Patient Engagement:   Assessed learning needs and learning preference: Yes.  Teaching Method(s) used: Explanation     Nutrition Education (Application):  a)  Discussed current eating plans / recommended alternative food choices     b)  Patient verbalizes understanding of diet by asking questions and goal setting       Anticipate >75% compliance   Stage of Change:  preparation        Nutrition Goals:  1) Increase exercise - aim for 15 min of elliptical machine and do 15 min of free weights per day   2) Track calories and aim for 8513-4319 calories per day using My Fitness  Pal or Cronometer   3) Add more veggies to the diet - aim for 2.5 cups of veggies per day   4) Instead of eating ice cream, eat yogurt with fruit (Chobani Zero, Oikos Triple Zero, Oikos Pro, or Two Good)   5) Work on meal planning - create a weekly menu, make a grocery list, and grocery shop once per week      Nutrition Follow Up / Monitoring:   Weight, PO intake, PA, labs (lipid panel)        Nutrition Recommendations:  Patient to follow-up with RD in 8 weeks.  Patient has RD contact information to call/email if needed.        Start time: 9:38  End time: 9:59     Total Time Duration: 21 min        Laura Thomas MS, RD, LD, [facility]  Clinical Dietitian  [phone]

## 2024-09-05 NOTE — TELEPHONE ENCOUNTER
Pended correct medication, Wegovy 1 mg which is approved by insurance for 1 year. Patient out of medication since 9/5/24  DOD sent in Ozempic, not Wegovy.     Message sent to PCP, Dr Payne for review / refill    Charu Jacobsen RN  [facility]

## 2024-09-06 ENCOUNTER — OFFICE VISIT (OUTPATIENT)
Dept: FAMILY MEDICINE | Facility: CLINIC | Age: 44
End: 2024-09-06
Payer: COMMERCIAL

## 2024-09-06 ENCOUNTER — TELEPHONE (OUTPATIENT)
Dept: FAMILY MEDICINE | Facility: CLINIC | Age: 44
End: 2024-09-06

## 2024-09-06 VITALS
BODY MASS INDEX: 42.36 KG/M2 | WEIGHT: 286 LBS | HEART RATE: 93 BPM | SYSTOLIC BLOOD PRESSURE: 137 MMHG | DIASTOLIC BLOOD PRESSURE: 76 MMHG | RESPIRATION RATE: 21 BRPM | OXYGEN SATURATION: 98 % | HEIGHT: 69 IN | TEMPERATURE: 97.4 F

## 2024-09-06 DIAGNOSIS — E66.01 CLASS 3 SEVERE OBESITY WITHOUT SERIOUS COMORBIDITY WITH BODY MASS INDEX (BMI) OF 40.0 TO 44.9 IN ADULT, UNSPECIFIED OBESITY TYPE (H): Primary | ICD-10-CM

## 2024-09-06 DIAGNOSIS — K59.00 CONSTIPATION, UNSPECIFIED CONSTIPATION TYPE: ICD-10-CM

## 2024-09-06 DIAGNOSIS — Z87.19 S/P REPAIR OF VENTRAL HERNIA: ICD-10-CM

## 2024-09-06 DIAGNOSIS — F41.1 GAD (GENERALIZED ANXIETY DISORDER): ICD-10-CM

## 2024-09-06 DIAGNOSIS — Z98.890 S/P REPAIR OF VENTRAL HERNIA: ICD-10-CM

## 2024-09-06 DIAGNOSIS — G89.29 CHRONIC BILATERAL LOW BACK PAIN WITHOUT SCIATICA: ICD-10-CM

## 2024-09-06 DIAGNOSIS — F33.1 MODERATE EPISODE OF RECURRENT MAJOR DEPRESSIVE DISORDER (H): ICD-10-CM

## 2024-09-06 DIAGNOSIS — M54.50 CHRONIC BILATERAL LOW BACK PAIN WITHOUT SCIATICA: ICD-10-CM

## 2024-09-06 DIAGNOSIS — E66.813 CLASS 3 SEVERE OBESITY WITHOUT SERIOUS COMORBIDITY WITH BODY MASS INDEX (BMI) OF 40.0 TO 44.9 IN ADULT, UNSPECIFIED OBESITY TYPE (H): Primary | ICD-10-CM

## 2024-09-06 DIAGNOSIS — K43.2 RECURRENT VENTRAL INCISIONAL HERNIA: ICD-10-CM

## 2024-09-06 PROCEDURE — 99214 OFFICE O/P EST MOD 30 MIN: CPT | Performed by: STUDENT IN AN ORGANIZED HEALTH CARE EDUCATION/TRAINING PROGRAM

## 2024-09-06 PROCEDURE — G2211 COMPLEX E/M VISIT ADD ON: HCPCS | Performed by: STUDENT IN AN ORGANIZED HEALTH CARE EDUCATION/TRAINING PROGRAM

## 2024-09-06 ASSESSMENT — ASTHMA QUESTIONNAIRES
QUESTION_1 LAST FOUR WEEKS HOW MUCH OF THE TIME DID YOUR ASTHMA KEEP YOU FROM GETTING AS MUCH DONE AT WORK, SCHOOL OR AT HOME: NONE OF THE TIME
ACT_TOTALSCORE: 21
QUESTION_5 LAST FOUR WEEKS HOW WOULD YOU RATE YOUR ASTHMA CONTROL: SOMEWHAT CONTROLLED
QUESTION_4 LAST FOUR WEEKS HOW OFTEN HAVE YOU USED YOUR RESCUE INHALER OR NEBULIZER MEDICATION (SUCH AS ALBUTEROL): NOT AT ALL
QUESTION_3 LAST FOUR WEEKS HOW OFTEN DID YOUR ASTHMA SYMPTOMS (WHEEZING, COUGHING, SHORTNESS OF BREATH, CHEST TIGHTNESS OR PAIN) WAKE YOU UP AT NIGHT OR EARLIER THAN USUAL IN THE MORNING: NOT AT ALL
ACT_TOTALSCORE: 21
QUESTION_2 LAST FOUR WEEKS HOW OFTEN HAVE YOU HAD SHORTNESS OF BREATH: THREE TO SIX TIMES A WEEK

## 2024-09-06 ASSESSMENT — ANXIETY QUESTIONNAIRES
GAD7 TOTAL SCORE: 7
GAD7 TOTAL SCORE: 7
7. FEELING AFRAID AS IF SOMETHING AWFUL MIGHT HAPPEN: SEVERAL DAYS
8. IF YOU CHECKED OFF ANY PROBLEMS, HOW DIFFICULT HAVE THESE MADE IT FOR YOU TO DO YOUR WORK, TAKE CARE OF THINGS AT HOME, OR GET ALONG WITH OTHER PEOPLE?: SOMEWHAT DIFFICULT
GAD7 TOTAL SCORE: 7

## 2024-09-06 ASSESSMENT — PATIENT HEALTH QUESTIONNAIRE - PHQ9: SUM OF ALL RESPONSES TO PHQ QUESTIONS 1-9: 4

## 2024-09-06 NOTE — PROGRESS NOTES
Yo was seen today for recheck.    Diagnoses and all orders for this visit:    Class 3 severe obesity without serious comorbidity with body mass index (BMI) of 40.0 to 44.9 in adult, unspecified obesity type (H)  Comments:  On Wegovy 1mg weekly. Has not lost weight. Has been eating a lot of unhealthy foods 2/2 stress and time. Did see nutrition recently. Discussed importance of a healthy diet and ideas that are easy when he is short on time.   - Continue Wegovy  - Keep food diary  - RTC 4 weeks  - Discussed anxiety and depression below.     Constipation, unspecified constipation type  Comments:  Improved, using miralax as needed. Hasn't needed it in 2 weeks.    Recurrent ventral incisional hernia  S/P repair of ventral hernia  Comments:  Appt with surgeon in December. Pt would like to lose weight prior to surgery.    GARDENIA (generalized anxiety disorder)  Moderate episode of recurrent major depressive disorder (H)  Comments:  On duloxetine (comorbid back pain) and wellbutrin. Depression controlled, anxiety has worsened. Recently started taking duloxetine BID. Discussed therapy, pt declined.     Chronic bilateral low back pain without sciatica  Chronic, intermittent flare ups. Recently increased duloxetine. Can take NSAIDs prn for exacerbations.       The longitudinal plan of care for the diagnosis(es)/condition(s) as documented were addressed during this visit. Due to the added complexity in care, I will continue to support Yo in the subsequent management and with ongoing continuity of care.      Subjective   Yo is a 44 year old, presenting for the following health issues:  RECHECK        9/6/2024    10:32 AM   Additional Questions   Roomed by m   Accompanied by SELF     History of Present Illness       Back Pain:  He presents for follow up of back pain. Patient's back pain is a chronic problem.  Location of back pain:  Left lower back and left hip  Description of back pain: cramping, dull ache and  "sharp  Back pain spreads: left buttocks    Since patient first noticed back pain, pain is: unchanged  Does back pain interfere with his job:  Yes       Mental Health Follow-up:  Patient presents to follow-up on Depression & Anxiety.Patient's depression since last visit has been:  No change  The patient is not having other symptoms associated with depression.  Patient's anxiety since last visit has been:  Medium  The patient is not having other symptoms associated with anxiety.  Any significant life events: No  Patient is feeling anxious or having panic attacks.  Patient has no concerns about alcohol or drug use.    He eats 0-1 servings of fruits and vegetables daily.He consumes 1 sweetened beverage(s) daily.He exercises with enough effort to increase his heart rate 10 to 19 minutes per day.  He exercises with enough effort to increase his heart rate 5 days per week. He is missing 1 dose(s) of medications per week.           3/19/2024     1:10 PM 8/8/2024    10:21 AM 9/6/2024    10:05 AM   GARDENIA-7 SCORE   Total Score 4 (minimal anxiety) 2 (minimal anxiety) 7 (mild anxiety)   Total Score 4 2 7         7/12/2024    10:15 AM 8/8/2024    10:23 AM 9/6/2024    11:09 AM   PHQ   PHQ-9 Total Score 6 3 4   Q9: Thoughts of better off dead/self-harm past 2 weeks Not at all Not at all Not at all     Recently more stressed than usually.   Recently increased duloxetine  Declined therapy      Wt Readings from Last 4 Encounters:   09/06/24 129.7 kg (286 lb)   08/08/24 129.7 kg (286 lb)   07/10/24 130.6 kg (288 lb)   05/13/24 127.9 kg (282 lb)     Has seen nutritionist  Has started keeping log of food            Objective    /76 (BP Location: Left arm, Patient Position: Sitting, Cuff Size: Adult Large)   Pulse 93   Temp 97.4  F (36.3  C) (Temporal)   Resp 21   Ht 1.75 m (5' 8.9\")   Wt 129.7 kg (286 lb)   SpO2 98%   BMI 42.36 kg/m    Body mass index is 42.36 kg/m .  Physical Exam   General: Well developed, well " nourished.  Skin:  Dry without rash.    Head:  Normocephalic-atraumatic.    Eye:  Normal conjunctivae.     Respiratory:  Normal respiratory effort.   Gastrointestinal:  Ventral hernia.  Musculoskeletal:  No deformity or edema.  Neurologic: No focal deficits.          Signed Electronically by: Cornelia Payne MD      Prior to immunization administration, verified patients identity using patient s name and date of birth. Please see Immunization Activity for additional information.     Screening Questionnaire for Adult Immunization    Are you sick today?   No   Do you have allergies to medications, food, a vaccine component or latex?   No   Have you ever had a serious reaction after receiving a vaccination?   No   Do you have a long-term health problem with heart, lung, kidney, or metabolic disease (e.g., diabetes), asthma, a blood disorder, no spleen, complement component deficiency, a cochlear implant, or a spinal fluid leak?  Are you on long-term aspirin therapy?   No   Do you have cancer, leukemia, HIV/AIDS, or any other immune system problem?   No   Do you have a parent, brother, or sister with an immune system problem?   No   In the past 3 months, have you taken medications that affect  your immune system, such as prednisone, other steroids, or anticancer drugs; drugs for the treatment of rheumatoid arthritis, Crohn s disease, or psoriasis; or have you had radiation treatments?   No   Have you had a seizure, or a brain or other nervous system problem?   No   During the past year, have you received a transfusion of blood or blood    products, or been given immune (gamma) globulin or antiviral drug?   No   For women: Are you pregnant or is there a chance you could become       pregnant during the next month?   No   Have you received any vaccinations in the past 4 weeks?   No     Immunization questionnaire answers were all negative.      Patient instructed to remain in clinic for 15 minutes afterwards, and to report any  adverse reactions.     Screening performed by HUGO Richmond MA on 9/6/2024 at 10:36 AM.

## 2024-09-06 NOTE — TELEPHONE ENCOUNTER
Incoming call from patient requesting confirmation of 9/6/24 appointment details. Writer confirmed that appointment is in-person at [facility], arrival time is 9:40AM. Patient verbalizes understanding.    Marylou Marques RN  [facility]

## 2024-09-10 NOTE — TELEPHONE ENCOUNTER
See separate phone thread started on 9/3. Patient was informed of the semiglutide 1mg waiting at the Pharmacy for him

## 2024-10-01 ENCOUNTER — TELEPHONE (OUTPATIENT)
Dept: FAMILY MEDICINE | Facility: CLINIC | Age: 44
End: 2024-10-01
Payer: COMMERCIAL

## 2024-10-01 NOTE — TELEPHONE ENCOUNTER
Received letter from patient's surgeon that hernia repair will be scheduled on 12/18/2024.  Please schedule preop visit within 30 days of this surgery date.

## 2024-10-02 ENCOUNTER — NURSE TRIAGE (OUTPATIENT)
Dept: FAMILY MEDICINE | Facility: CLINIC | Age: 44
End: 2024-10-02
Payer: COMMERCIAL

## 2024-10-02 NOTE — TELEPHONE ENCOUNTER
12/3/2024  2:00 PM PRE-OPERATIVE 30 min [facility]/OB Cornelia Payne MD   Location Instructions:    [facility] is located at [address] [place] [address] in [place], at the intersection of [facility]. This is one block south of the [facility]. Free parking is available in the lot directly north of the clinic across [facility]. The clinic is near stops along bus [address].

## 2024-10-02 NOTE — TELEPHONE ENCOUNTER
New Medication Request        What medication are you requesting?: Pain killer.    Reason for medication request: Patient been having back pain and would like some medication for it.     Have you taken this medication before?: No    Controlled Substance Agreement on file:   CSA -- Patient Level:    CSA: None found at the patient level.         Patient offered an appointment? No but patient have an appointment with Dr. Payne on 10/18/24    Preferred Pharmacy:     [facility][id number] - [place], [place] - [address] [facility] [address]  [facility] [address]  [place] [address]  Phone: [phone] Fax: [phone]      Okay to leave a detailed message?: Yes at Home number on file [phone] (home)

## 2024-10-02 NOTE — TELEPHONE ENCOUNTER
"SEE IN OFFICE WITHIN 2 WEEKS:   * You need to be examined.   * Schedule appointment on 11/7/24 at 1140    Routed to PCP for review    Reason for Disposition   Back pain lasts > 2 weeks    Answer Assessment - Initial Assessment Questions  1. ONSET: \"When did the pain begin?\"       Past few weeks, gets bad after work    2. LOCATION: \"Where does it hurt?\" (upper, mid or lower back)      Lower back, wraps around abdomen    3. SEVERITY: \"How bad is the pain?\"  (e.g., Scale 1-10; mild, moderate, or severe)    4-5/10. Can go up to 7/10    4. PATTERN: \"Is the pain constant?\" (e.g., yes, no; constant, intermittent)       When it starts hurting, it stays. If I lay on my back, it subsides quickly    5. RADIATION: \"Does the pain shoot into your legs or somewhere else?\"      No    6. CAUSE:  \"What do you think is causing the back pain?\"       Hernia     7. BACK OVERUSE:  \"Any recent lifting of heavy objects, strenuous work or exercise?\"      No    8. MEDICINES: \"What have you taken so far for the pain?\" (e.g., nothing, acetaminophen, NSAIDS)      Tylenol and ibuprofen    9. NEUROLOGIC SYMPTOMS: \"Do you have any weakness, numbness, or problems with bowel/bladder control?\"      No    10. OTHER SYMPTOMS: \"Do you have any other symptoms?\" (e.g., fever, abdomen pain, burning with urination, blood in urine)        No    11. PREGNANCY: \"Is there any chance you are pregnant?\" \"When was your last menstrual period?\"        N/A    Protocols used: Back Pain-A-[place]    Real HODGES RN  [facility]     "

## 2024-10-07 ENCOUNTER — OFFICE VISIT (OUTPATIENT)
Dept: FAMILY MEDICINE | Facility: CLINIC | Age: 44
End: 2024-10-07
Payer: COMMERCIAL

## 2024-10-07 ENCOUNTER — ANCILLARY PROCEDURE (OUTPATIENT)
Dept: GENERAL RADIOLOGY | Facility: CLINIC | Age: 44
End: 2024-10-07
Attending: STUDENT IN AN ORGANIZED HEALTH CARE EDUCATION/TRAINING PROGRAM
Payer: COMMERCIAL

## 2024-10-07 VITALS
DIASTOLIC BLOOD PRESSURE: 85 MMHG | HEART RATE: 93 BPM | SYSTOLIC BLOOD PRESSURE: 127 MMHG | RESPIRATION RATE: 18 BRPM | OXYGEN SATURATION: 96 % | HEIGHT: 69 IN | BODY MASS INDEX: 42.36 KG/M2 | WEIGHT: 286 LBS | TEMPERATURE: 97.1 F

## 2024-10-07 DIAGNOSIS — Z23 NEED FOR COVID-19 VACCINE: ICD-10-CM

## 2024-10-07 DIAGNOSIS — G89.29 CHRONIC BILATERAL LOW BACK PAIN WITHOUT SCIATICA: Primary | ICD-10-CM

## 2024-10-07 DIAGNOSIS — G89.29 CHRONIC BILATERAL LOW BACK PAIN WITHOUT SCIATICA: ICD-10-CM

## 2024-10-07 DIAGNOSIS — E66.813 CLASS 3 SEVERE OBESITY WITHOUT SERIOUS COMORBIDITY WITH BODY MASS INDEX (BMI) OF 40.0 TO 44.9 IN ADULT, UNSPECIFIED OBESITY TYPE (H): ICD-10-CM

## 2024-10-07 DIAGNOSIS — M54.50 CHRONIC BILATERAL LOW BACK PAIN WITHOUT SCIATICA: Primary | ICD-10-CM

## 2024-10-07 DIAGNOSIS — E66.01 CLASS 3 SEVERE OBESITY WITHOUT SERIOUS COMORBIDITY WITH BODY MASS INDEX (BMI) OF 40.0 TO 44.9 IN ADULT, UNSPECIFIED OBESITY TYPE (H): ICD-10-CM

## 2024-10-07 DIAGNOSIS — Z87.19 S/P REPAIR OF VENTRAL HERNIA: ICD-10-CM

## 2024-10-07 DIAGNOSIS — K43.2 RECURRENT VENTRAL INCISIONAL HERNIA: ICD-10-CM

## 2024-10-07 DIAGNOSIS — Z23 NEED FOR HEPATITIS B VACCINATION: ICD-10-CM

## 2024-10-07 DIAGNOSIS — G47.10 EXCESSIVE SLEEPINESS: ICD-10-CM

## 2024-10-07 DIAGNOSIS — M54.50 CHRONIC BILATERAL LOW BACK PAIN WITHOUT SCIATICA: ICD-10-CM

## 2024-10-07 DIAGNOSIS — Z23 NEEDS FLU SHOT: ICD-10-CM

## 2024-10-07 DIAGNOSIS — Z23 NEED FOR PROPHYLACTIC VACCINATION AGAINST STREPTOCOCCUS PNEUMONIAE (PNEUMOCOCCUS): ICD-10-CM

## 2024-10-07 DIAGNOSIS — Z98.890 S/P REPAIR OF VENTRAL HERNIA: ICD-10-CM

## 2024-10-07 PROCEDURE — 90677 PCV20 VACCINE IM: CPT | Performed by: STUDENT IN AN ORGANIZED HEALTH CARE EDUCATION/TRAINING PROGRAM

## 2024-10-07 PROCEDURE — 90471 IMMUNIZATION ADMIN: CPT | Performed by: STUDENT IN AN ORGANIZED HEALTH CARE EDUCATION/TRAINING PROGRAM

## 2024-10-07 PROCEDURE — 72100 X-RAY EXAM L-S SPINE 2/3 VWS: CPT | Mod: TC | Performed by: INTERNAL MEDICINE

## 2024-10-07 PROCEDURE — 99214 OFFICE O/P EST MOD 30 MIN: CPT | Mod: 25 | Performed by: STUDENT IN AN ORGANIZED HEALTH CARE EDUCATION/TRAINING PROGRAM

## 2024-10-07 ASSESSMENT — ENCOUNTER SYMPTOMS: BACK PAIN: 1

## 2024-10-07 NOTE — PROGRESS NOTES
Yo was seen today for back pain and abdominal pain.    Diagnoses and all orders for this visit:    Chronic bilateral low back pain without sciatica  Chronic, likely MSK 2/2 obesity/hernia.  Patient reports previously treated with opioids with prior PCP.  Discussed I would not recommend opiates for chronic pain.  Saw pain management 3/5/2024, recommend to maximize neuropathic muscle relaxants in preoperative period, can can after surgery.  Referred to PT and pool therapy.  Recommend psychology for anxiety and stress.  Recommend trialing cyclobenzaprine, x-ray today, full therapy.  Will have TC help schedule.  -     XR Lumbar Spine 2/3 Views; Future  -     Physical Therapy  Referral; Future    Class 3 severe obesity without serious comorbidity with body mass index (BMI) of 40.0 to 44.9 in adult, unspecified obesity type (H)  On semaglutide 1 mg weekly. Has not lost any weight, admits to poor diet. Continue current dose for now given ongoing constipation. Appt with weight management clinic in December.     S/P repair of ventral hernia  Recurrent ventral incisional hernia  Comments:  Surgery planned 12/18/2024. Received preop recs, in inbox.  Appointment 12/3/2024.    Needs flu shot    Need for COVID-19 vaccine    Need for hepatitis B vaccination  Comments:  Discussed, patient would like to defer.    Need for prophylactic vaccination against Streptococcus pneumoniae (pneumococcus)  -     Pneumococcal 20 Valent Conjugate (Prevnar 20)    Excessive sleepiness  Comments:  Sleep medicine appointment 11/1/2024 and 11/25/2024.    The longitudinal plan of care for the diagnosis(es)/condition(s) as documented were addressed during this visit. Due to the added complexity in care, I will continue to support Yo in the subsequent management and with ongoing continuity of care.        Subjective   Yo is a 44 year old, presenting for the following health issues:  Back Pain (Lower back pain for awhile ) and  "Abdominal Pain        10/7/2024    11:58 AM   Additional Questions   Roomed by hser   Accompanied by self     History of Present Illness       Back Pain:  He presents for follow up of back pain. Patient's back pain is a recurring problem.  Location of back pain:  Right lower back, left lower back, right hip and left hip  Description of back pain: gnawing, sharp and stabbing  Back pain spreads: nowhere    Since patient first noticed back pain, pain is: gradually worsening  Does back pain interfere with his job:  Yes      He is taking medications regularly.     Wt Readings from Last 4 Encounters:   10/07/24 129.7 kg (286 lb)   09/06/24 129.7 kg (286 lb)   08/08/24 129.7 kg (286 lb)   07/10/24 130.6 kg (288 lb)   Appointment with weight management clinic in December.    Chronic bilateral low back pain without sciatica  - Denies red flags, including: Fever, history of IV drug use or malignancy, trauma or injury, loss of bowel or bladder control, saddle anesthesia, numbness, weakness, or tingling of lower extremities.  Denies neurological red flag symptoms.  Saw pain management, recommend neuropathic muscle relaxants, PT, pool therapy.  Duloxetine as noted below.  Current management:  -     DULoxetine (CYMBALTA) 30 MG capsule; Take 1 capsule (30 mg) by mouth 2 times daily  -Tylenol and ibuprofen as needed  - Has Cyclobenzaprine 5 mg 3 times daily as needed -patient reports he forgot about it and has not been using it.          Objective    /85 (BP Location: Right arm, Patient Position: Sitting, Cuff Size: Adult Large)   Pulse 93   Temp 97.1  F (36.2  C) (Temporal)   Resp 18   Ht 1.75 m (5' 8.9\")   Wt 129.7 kg (286 lb)   SpO2 96%   BMI 42.36 kg/m    Body mass index is 42.36 kg/m .  Physical Exam   General Appearance:  Alert, cooperative, no distress, appears stated age.  Head:  Normocephalic, without obvious abnormality, atraumatic.  Eyes:  Conjunctivae/corneas clear, extraocular movements intact both " eyes.  Abdomen: Ventral hernia.  Extremities:  Atraumatic, no cyanosis or edema.  No tenderness to palpation of the spine or paraspinal muscles.  Normal range of motion.  Straight leg raise test negative bilaterally.  Skin:  Skin color, texture, turgor normal, no rashes or lesions.  Neurologic: No focal deficits.          Signed Electronically by: Cornelia Payne MD      Prior to immunization administration, verified patients identity using patient s name and date of birth. Please see Immunization Activity for additional information.     Screening Questionnaire for Adult Immunization    Are you sick today?   No   Do you have allergies to medications, food, a vaccine component or latex?   Yes   Have you ever had a serious reaction after receiving a vaccination?   No   Do you have a long-term health problem with heart, lung, kidney, or metabolic disease (e.g., diabetes), asthma, a blood disorder, no spleen, complement component deficiency, a cochlear implant, or a spinal fluid leak?  Are you on long-term aspirin therapy?   No   Do you have cancer, leukemia, HIV/AIDS, or any other immune system problem?   No   Do you have a parent, brother, or sister with an immune system problem?   No   In the past 3 months, have you taken medications that affect  your immune system, such as prednisone, other steroids, or anticancer drugs; drugs for the treatment of rheumatoid arthritis, Crohn s disease, or psoriasis; or have you had radiation treatments?   No   Have you had a seizure, or a brain or other nervous system problem?   No   During the past year, have you received a transfusion of blood or blood    products, or been given immune (gamma) globulin or antiviral drug?   No   For women: Are you pregnant or is there a chance you could become       pregnant during the next month?   No   Have you received any vaccinations in the past 4 weeks?   No     Immunization questionnaire was positive for at least one answer.  Notified  provider.      Patient instructed to remain in clinic for 15 minutes afterwards, and to report any adverse reactions.     Screening performed by Basilio Juarez MA on 10/7/2024 at 12:01 PM.

## 2024-10-07 NOTE — PROGRESS NOTES
Chronic bilateral low back pain without sciatica  Comments:  Denies neurological red flag symptoms.  Saw pain management, recommend neuropathic muscle relaxants, PT, pool therapy.  Duloxetine as noted below.  Orders:  -     DULoxetine (CYMBALTA) 30 MG capsule; Take 1 capsule (30 mg) by mouth 2 times daily  -Tylenol and ibuprofen as needed  -Cyclobenzaprine 5 mg 3 times daily as needed***

## 2024-10-08 ENCOUNTER — TELEPHONE (OUTPATIENT)
Dept: FAMILY MEDICINE | Facility: CLINIC | Age: 44
End: 2024-10-08
Payer: COMMERCIAL

## 2024-10-08 NOTE — TELEPHONE ENCOUNTER
Call #1. LM on VM.    Elin MAGDALENO RN  [facility]      ----- Message from Corneliamisha Payne sent at 10/8/2024 10:47 AM CDT -----  X-ray of your low back does show a mild right curvature, small misalignment of the lumbar third and fourth vertebrae.  Mild disc space narrowing at the fifth lumbar bone and first sacral bone.  There is some enlargement of parts of the bones of the lumbar spine that could cause pain and stiffness, although I am not sure if this is the cause of your pain.  I still strongly recommend pool therapy and conservative treatment with medications. If you would like to see a spine doctor, I can place a referral.

## 2024-10-08 NOTE — TELEPHONE ENCOUNTER
"Call received from patient, not happy to be connected to [facility] instead of [facility].  Requested to speak to YON Worthington as he had not heard from PCP  Writer reviewed several TE's from today and advised patient messages had been sent to PCP who had not had a chance to address at this time, and that typically 48-72 time frame. However, patient states this is urgent and PCP site is aware of that. States he presented on site earlier and thought he would have heard back by now.  States talked to RN who told him \"three different things so now I'm confused and instead of having other people tell me her message I would think she could call me\" speaking of PCP.  Writer advised patient should return call tomorrow before end of day if has not heard back, but patient states he will present again on site if he does not hear back tonight.  "

## 2024-10-08 NOTE — PROGRESS NOTES
Called patient he ha some other issues, sent the call to the nurse. Will follow up with Pool therapy. Regular PT doesn't schedule this. He was given a number to call

## 2024-10-08 NOTE — TELEPHONE ENCOUNTER
Please call to triage pt. Given complex history, he will likely need to be evaluated in person in UC or ED.

## 2024-10-08 NOTE — TELEPHONE ENCOUNTER
New Medication Request        What medication are you requesting?: Ciprofloxacin and Metronidazole     Reason for medication request: Diverticulitis flare up    Have you taken this medication before?: Yes: It has helped in the past     Controlled Substance Agreement on file:   CSA -- Patient Level:    CSA: None found at the patient level.         Patient offered an appointment? No just saw Thor 10/7    Preferred Pharmacy:   WRITTEN PRESCRIPTION REQUESTED  No address on file    [id number] - [place], [place] - [address] AT [facility] [address]  [place] [address]  Phone: [phone] Fax: [phone]      Okay to leave a detailed message?: Yes at Cell number on file:    Telephone Information:   Mobile [phone]

## 2024-10-08 NOTE — TELEPHONE ENCOUNTER
Patient returned call.  Provider following test result message and recommendation provided.  - patient not interested in spine therapy.     Message from Corneliamisha Payne sent at 10/8/2024 10:47 AM CDT -----  X-ray of your low back does show a mild right curvature, small misalignment of the lumbar third and fourth vertebrae.  Mild disc space narrowing at the fifth lumbar bone and first sacral bone.  There is some enlargement of parts of the bones of the lumbar spine that could cause pain and stiffness, although I am not sure if this is the cause of your pain.  I still strongly recommend pool therapy and conservative treatment with medications. If you would like to see a spine doctor, I can place a referral.           Will route encounter to provider for review and recommend regarding antibiotics to prevent worsening diverticulitis flare up.      Fadi Burton RN  [facility]

## 2024-10-09 NOTE — TELEPHONE ENCOUNTER
Patient called back to update provider he just had a very large bowel movement and his stomach pain has felt much better.  However, still would like the Flagly and Cipro to prevent diverticulitis flare up prior to surgery.    RN advised to continue to monitor symptoms and if not better, please proceed to the ER/UC per provider recommendation below.    Cornelia Payne MD   Physician  Specialty: Family Medicine     Telephone Encounter  Signed     Encounter Date: 10/8/2024       Please call to triage pt. Given complex history, he will likely need to be evaluated in person in UC or ED.            Fadi Burton RN  [facility]

## 2024-10-09 NOTE — TELEPHONE ENCOUNTER
Spoke to pt and relayed Karen's recommendation for pt to seek UC or ED evaluation. Pt verb agreement with plan, states will proceed to ED.     Elin MAGDALENO RN  [facility]

## 2024-10-09 NOTE — TELEPHONE ENCOUNTER
I concur with Dr Payne--pt needs to be evaluated in urgent care or ED.  [facility]  Karen Robledo PA-C on 10/9/2024 at 12:37 PM

## 2024-10-09 NOTE — TELEPHONE ENCOUNTER
Spoke to pt. Pt states abd is minimal today, but after 8 to 10 hrs of working, abdominal pain on set is sudden and severe when he stands. Pt adamantly feels abd pain is related to   diverticulitis (resulting in bowel resection).  Pt verb he is certain that abd pain is related to diverticulitis. Historically his abdominal pain has been effectively managed with combination of two antibiotics Metronidazole 500 mg and Ciprofloxacin.     Pt is requesting prescriptions for Metronidazole 500 mg and Ciprofloxacin first, if abd pain is not resolved after abx treatment, then will proceed to ER for further evaluation.     Note: Pt stopped by clinic yesterday to request for the above antibiotics. Dr Payne recommended UC or ED yesterday due to pt's complex medical history with diverticulitis and hernia. However, pt declined and would like to prevent going to the ED by trying the requested antibiotics first.     Please advise,    Elin MAGDALENO RN  [facility]

## 2024-10-10 ENCOUNTER — NURSE TRIAGE (OUTPATIENT)
Dept: FAMILY MEDICINE | Facility: CLINIC | Age: 44
End: 2024-10-10
Payer: COMMERCIAL

## 2024-10-10 NOTE — TELEPHONE ENCOUNTER
Dr. Payne - Please review and advise.    Patient forgot to discuss abdominal pain during 10/7/24 visit with you. He is requesting antibiotic treatment for diverticulitis. Writer advised he will probably need an appointment for eval prior to treatment. Has follow-up scheduled with you 10/18/24, was reluctant to schedule something sooner.    Nurse Triage SBAR    Is this a 2nd Level Triage? Yes    Situation: Intermittent abdominal pain x4 months.    Background:   Hx of diverticulitis, perforated bowel with surgical repair--notes pain is similar.    Assessment:   Notes symptoms started shortly after starting Wegovy earlier this year.  Endorses pain with bowel motility.  Comes and goes, variable intensity--10/10 pain at worst, currently 4/10  Endorses constipation.   Had large BM yesterday and pain has decreased since.  Pain improves with rest, worsens with prolonged activity.  Denies vomiting, fever, bloating, blood in stool, urinary symptoms.  Has started taking probiotics, increasing water consumption, taking probiotics.    Protocol Recommended Disposition:   See in Office Today or Tomorrow    Recommendation:   Patient declines appointment--was seen 10/8, has follow-up scheduled 10/18.  Patient requests Dr. Payne order metronidazole and ciprofloxacin, as this helped when he had diverticulitis previously.   See Care Advice section for home care recommendations.    Routed to provider    Does the patient meet one of the following criteria for ADS visit consideration? 16+ years old, with an [facility] PCP     TIP  Providers, please consider if this condition is appropriate for management at one of our Acute and Diagnostic Services sites.     If patient is a good candidate, please use dotphrase <dot>triageresponse and select Refer to ADS to document.      Reason for Disposition   MILD pain (e.g., does not interfere with normal activities) and pain comes and goes (cramps) lasts > 48 hours  (Exception: This same abdominal pain is a  chronic symptom recurrent or ongoing AND present > 4 weeks.)    Additional Information   Negative: Passed out (i.e., fainted, collapsed and was not responding)   Negative: Shock suspected (e.g., cold/pale/clammy skin, too weak to stand, low BP, rapid pulse)   Negative: Sounds like a life-threatening emergency to the triager   Negative: Followed an abdomen (stomach) injury   Negative: Chest pain   Negative: Pain is mainly in upper abdomen (if needed ask: 'is it mainly above the belly button?')   Negative: Abdomen bloating or swelling are main symptoms   Negative: SEVERE abdominal pain (e.g., excruciating)   Negative: Vomiting red blood or black (coffee ground) material   Negative: Blood in bowel movements  (Exception: Blood on surface of BM with constipation.)   Negative: Black or tarry bowel movements  (Exception: Chronic-unchanged black-grey BMs AND is taking iron pills or Pepto-Bismol.)   Negative: Unable to urinate (or only a few drops) and bladder feels very full   Negative: Pain in scrotum persists > 1 hour   Negative: MILD TO MODERATE constant pain lasting > 2 hours   Negative: Vomiting bile (green color)   Negative: Patient sounds very sick or weak to the triager   Negative: Vomiting and abdomen looks much more swollen than usual   Negative: White of the eyes have turned yellow (i.e., jaundice)   Negative: Blood in urine (red, pink, or tea-colored)   Negative: Fever > 103 F (39.4 C)   Negative: Fever > 101 F (38.3 C) and over 60 years of age   Negative: Fever > 100.0 F (37.8 C) and has diabetes mellitus or a weak immune system (e.g., HIV positive, cancer chemotherapy, organ transplant, splenectomy, chronic steroids)   Negative: Fever > 100.0 F (37.8 C) and bedridden (e.g., CVA, chronic illness, recovering from surgery)   Negative: MODERATE pain (e.g., interferes with normal activities) that comes and goes (cramps) lasts > 24 hours  (Exception: Pain with Vomiting or Diarrhea - see that Protocol.)   Negative:  Age > 60 years   Negative: Patient wants to be seen    Protocols used: Abdominal Pain - Male-A-[place]

## 2024-10-10 NOTE — TELEPHONE ENCOUNTER
I recommend he go to ED or ADS so he can get imaging done. I can't be sure that this isn't a more severe concern, given his complex abdominal history. and he needs to be evaluated. I also recommend we stop Wegovy.

## 2024-10-10 NOTE — PROGRESS NOTES
10/10/24 Talked to patient about scheduling his pool therapy. He has the phone number and wants to schedule this himself. Closing encounter

## 2024-10-10 NOTE — TELEPHONE ENCOUNTER
Patient Returning Call    Reason for call:  patient was seen by Dr Payne on 10/8/24 for abdominal and back. Spend the visit discussing Back and they forgot about his abdominal issues, he has this issue before and went UC and was given the following medications - Ciprofloxacin and Metronidazole  for a Diverticulitis         He was told to go to UC, he Didn't go since he and just seen Dr Payne that day but only wanted these medications that worked the meds worked and these meds worked.    He is now on probiotics,Senna and miralax and changed his diet,he has  bowel issues but the anti biotics seem to be worked before . Should he  be on the antibiotics or continue the course .      He has an appt on 10/18/24   Information relayed to patient:  told him we will send to nurse [place], he would actually like a call from Dr Payne. So there is no mix up    Patient has additional questions:  No      Okay to leave a detailed message?: Yes at Home number on file [phone] (home)

## 2024-10-10 NOTE — TELEPHONE ENCOUNTER
"Called and spoke with patient. He declined being seen at ADS or ED. He states, \"I will keep an eye on it and if it gets worse again I will go to [facility]\".     Patient states that he thinks it is just constipation that is improving. He states he does not want possibly surgery again so does not want to go in again. Educated patient that waiting until it is emergent may increase chances of it being another surgery versus addressing these symptoms via other interventions.     Valarie Day RN  [facility]        "

## 2024-10-15 NOTE — TELEPHONE ENCOUNTER
As per my prior messages to pt, I recommend he be seen at ADS or ED. He has a complex history and I don't want to miss anything more serious.

## 2024-10-15 NOTE — TELEPHONE ENCOUNTER
Dr. Payne,    Please review.    Real HODGES RN  [facility]

## 2024-10-15 NOTE — TELEPHONE ENCOUNTER
Called patient, relayed message.  He is feeling better, taking probiotics and stool softeners.  Advancing a bland diet.  Has appt with PCP 10/18/24 for follow-up.   Agrees to call back if new/worsening symptoms.    Marylou Marques RN  [facility]

## 2024-10-18 ENCOUNTER — OFFICE VISIT (OUTPATIENT)
Dept: FAMILY MEDICINE | Facility: CLINIC | Age: 44
End: 2024-10-18
Payer: COMMERCIAL

## 2024-10-18 VITALS
WEIGHT: 281 LBS | DIASTOLIC BLOOD PRESSURE: 80 MMHG | HEART RATE: 72 BPM | SYSTOLIC BLOOD PRESSURE: 117 MMHG | OXYGEN SATURATION: 99 % | TEMPERATURE: 97.2 F | HEIGHT: 69 IN | BODY MASS INDEX: 41.62 KG/M2 | RESPIRATION RATE: 16 BRPM

## 2024-10-18 DIAGNOSIS — Z87.19 HISTORY OF DIVERTICULITIS: ICD-10-CM

## 2024-10-18 DIAGNOSIS — J45.20 MILD INTERMITTENT ASTHMA WITHOUT COMPLICATION: ICD-10-CM

## 2024-10-18 DIAGNOSIS — R06.02 SOB (SHORTNESS OF BREATH): ICD-10-CM

## 2024-10-18 DIAGNOSIS — E66.01 CLASS 3 SEVERE OBESITY WITHOUT SERIOUS COMORBIDITY WITH BODY MASS INDEX (BMI) OF 40.0 TO 44.9 IN ADULT, UNSPECIFIED OBESITY TYPE (H): ICD-10-CM

## 2024-10-18 DIAGNOSIS — Z87.19 S/P REPAIR OF VENTRAL HERNIA: ICD-10-CM

## 2024-10-18 DIAGNOSIS — K59.00 CONSTIPATION, UNSPECIFIED CONSTIPATION TYPE: Primary | ICD-10-CM

## 2024-10-18 DIAGNOSIS — Z87.891 FORMER SMOKER: ICD-10-CM

## 2024-10-18 DIAGNOSIS — K43.2 RECURRENT VENTRAL INCISIONAL HERNIA: ICD-10-CM

## 2024-10-18 DIAGNOSIS — E66.813 CLASS 3 SEVERE OBESITY WITHOUT SERIOUS COMORBIDITY WITH BODY MASS INDEX (BMI) OF 40.0 TO 44.9 IN ADULT, UNSPECIFIED OBESITY TYPE (H): ICD-10-CM

## 2024-10-18 DIAGNOSIS — G89.29 CHRONIC BILATERAL LOW BACK PAIN WITHOUT SCIATICA: ICD-10-CM

## 2024-10-18 DIAGNOSIS — Z98.890 S/P REPAIR OF VENTRAL HERNIA: ICD-10-CM

## 2024-10-18 DIAGNOSIS — M54.50 CHRONIC BILATERAL LOW BACK PAIN WITHOUT SCIATICA: ICD-10-CM

## 2024-10-18 PROCEDURE — G2211 COMPLEX E/M VISIT ADD ON: HCPCS | Performed by: STUDENT IN AN ORGANIZED HEALTH CARE EDUCATION/TRAINING PROGRAM

## 2024-10-18 PROCEDURE — 99215 OFFICE O/P EST HI 40 MIN: CPT | Performed by: STUDENT IN AN ORGANIZED HEALTH CARE EDUCATION/TRAINING PROGRAM

## 2024-10-18 RX ORDER — SENNOSIDES A AND B 8.6 MG/1
1 TABLET, FILM COATED ORAL 2 TIMES DAILY PRN
Qty: 120 TABLET | Refills: 1 | Status: SHIPPED | OUTPATIENT
Start: 2024-10-18

## 2024-10-18 RX ORDER — ALBUTEROL SULFATE 90 UG/1
2 AEROSOL, METERED RESPIRATORY (INHALATION) EVERY 4 HOURS PRN
Qty: 18 G | Refills: 1 | Status: SHIPPED | OUTPATIENT
Start: 2024-10-18

## 2024-10-18 NOTE — PROGRESS NOTES
Yo was seen today for office visit.    Diagnoses and all orders for this visit:    Constipation, unspecified constipation type  History of diverticulitis  Recurrent ventral incisional hernia  S/P repair of ventral hernia  Patient made call to clinic recently regarding abdominal pain, requesting antibiotics for diverticulitis.  Recommended the patient go to ED/ADS, patient declined, due to concern that they would admit him.  Discussed that if pain is severe, given his complex medical history, that he should report to ED ADS and obtain imaging as needed.  Patient was agreeable.  Abdominal pain has resolved today.  Benign abdominal exam.  Ventral hernia was reducible.  I do not think imaging is indicated at this time.  - Continue medications for constipation as needed  -     senna (SENOKOT) 8.6 MG tablet; Take 1 tablet by mouth 2 times daily as needed for constipation.  -Ventral hernia surgery in December as scheduled  -Discontinue Wegovy as this may be worsening patient's constipation/symptoms    Chronic bilateral low back pain  Stable, no red flag symptoms. has not tried cyclobenzaprine.  Recommended trying this.  Declined referral to PT or spine.  Continue ibuprofen sparingly, Tylenol.    Mild intermittent asthma without complication  SOB (shortness of breath)  Reports he was told he had asthma, has never had PFT.  Thinks that symptoms started after he had COVID.  Discussed PFT, patient would like to complete PFT.  Refill Ventolin  -     VENTOLIN  (90 Base) MCG/ACT inhaler; Inhale 2 puffs into the lungs every 4 hours as needed for shortness of breath, wheezing or cough.  -     General PFT Lab (Please always keep checked); Future  -     Pulmonary Function Test; Future    Former smoker  Comments:  Started 20, stopped at 39yo. 0.5-1PPD.  Orders:  -     General PFT Lab (Please always keep checked); Future  -     Pulmonary Function Test; Future    Class 3 severe obesity without serious comorbidity with body  mass index (BMI) of 40.0 to 44.9 in adult, unspecified obesity type (H)  Discontinue Wegovy as this may be worsening constipation and abdominal symptoms.  Follow-up with weight loss clinic as scheduled.  Continue lifestyle modifications      50 minutes spent precharting, face-to-face, and documentation    The longitudinal plan of care for the diagnosis(es)/condition(s) as documented were addressed during this visit. Due to the added complexity in care, I will continue to support Aníbal in the subsequent management and with ongoing continuity of care.      Zechariah Ram is a 44 year old, presenting for the following health issues:  office visit (1 mon follow up)        10/18/2024    10:22 AM   Additional Questions   Roomed by eh   Accompanied by self     History of Present Illness       Back Pain:  He presents for follow up of back pain. Patient's back pain is a recurring problem.  Location of back pain:  Right lower back, left lower back, right hip and left hip  Description of back pain: gnawing, sharp and stabbing  Back pain spreads: nowhere    Since patient first noticed back pain, pain is: gradually worsening  Does back pain interfere with his job:  Yes      He is taking medications regularly.    Lumbar XR 10/7/24:  IMPRESSION: 5 lumbar type vertebral bodies. Mild rightward curvature of the midlumbar spine. Trace retrolisthesis of L3 on L4. Lumbar vertebral body heights appear maintained. Mild disc space narrowing at L5-S1. Facet hypertrophy in the lower lumbar   spine. Visualized soft tissues are unremarkable.  - Saw pain management 3/5/2024, recommend to maximize neuropathic muscle relaxants in preoperative period, can can after surgery.  Referred to PT and pool therapy.  Recommend psychology for anxiety and stress.  - Declined referral to spine  - Current management: Tylenol, Flexeril, duloxetine, ibuprofen        Obesity  Wt Readings from Last 4 Encounters:   10/18/24 127.5 kg (281 lb)   10/07/24 129.7  "kg (286 lb)   09/06/24 129.7 kg (286 lb)   08/08/24 129.7 kg (286 lb)     Constipation  Has not been using constipation meds as well              Objective    /80 (BP Location: Left arm, Patient Position: Sitting, Cuff Size: Adult Large)   Pulse 72   Temp 97.2  F (36.2  C) (Temporal)   Resp 16   Ht 1.75 m (5' 8.9\")   Wt 127.5 kg (281 lb)   SpO2 99%   BMI 41.62 kg/m    Body mass index is 41.62 kg/m .  Physical Exam   General Appearance:  Alert, cooperative, no distress, appears stated age.  Head:  Normocephalic, without obvious abnormality, atraumatic.  Heart:  Regular rate and rhythm, S1 and S2 normal, no murmur, rub or gallop.  Abdomen: Large ventral hernia.  Reducible.  Soft, non-tender, bowel sounds active all four quadrants.   Extremities:  Atraumatic, no cyanosis or edema.  Skin:  Skin color, texture, turgor normal, no rashes or lesions.  Neurologic: No focal deficits.          Signed Electronically by: Cornelia Payne MD    Prior to immunization administration, verified patients identity using patient s name and date of birth. Please see Immunization Activity for additional information.     Screening Questionnaire for Adult Immunization    Are you sick today?   No   Do you have allergies to medications, food, a vaccine component or latex?   No   Have you ever had a serious reaction after receiving a vaccination?   No   Do you have a long-term health problem with heart, lung, kidney, or metabolic disease (e.g., diabetes), asthma, a blood disorder, no spleen, complement component deficiency, a cochlear implant, or a spinal fluid leak?  Are you on long-term aspirin therapy?   No   Do you have cancer, leukemia, HIV/AIDS, or any other immune system problem?   No   Do you have a parent, brother, or sister with an immune system problem?   No   In the past 3 months, have you taken medications that affect  your immune system, such as prednisone, other steroids, or anticancer drugs; drugs for the treatment of " rheumatoid arthritis, Crohn s disease, or psoriasis; or have you had radiation treatments?   No   Have you had a seizure, or a brain or other nervous system problem?   No   During the past year, have you received a transfusion of blood or blood    products, or been given immune (gamma) globulin or antiviral drug?   No   For women: Are you pregnant or is there a chance you could become       pregnant during the next month?   No   Have you received any vaccinations in the past 4 weeks?   Yes     Immunization questionnaire was positive for at least one answer.  Notified provider.      Patient instructed to remain in clinic for 15 minutes afterwards, and to report any adverse reactions.     Screening performed by Lakhwinder Garcia MA on 10/18/2024 at 10:26 AM.

## 2024-11-01 ENCOUNTER — DOCUMENTATION ONLY (OUTPATIENT)
Dept: SLEEP MEDICINE | Facility: CLINIC | Age: 44
End: 2024-11-01

## 2024-11-01 ENCOUNTER — THERAPY VISIT (OUTPATIENT)
Dept: SLEEP MEDICINE | Facility: CLINIC | Age: 44
End: 2024-11-01
Payer: COMMERCIAL

## 2024-11-01 DIAGNOSIS — G47.10 EXCESSIVE SLEEPINESS: ICD-10-CM

## 2024-11-01 DIAGNOSIS — R53.83 OTHER FATIGUE: ICD-10-CM

## 2024-11-01 DIAGNOSIS — R06.83 SNORING: ICD-10-CM

## 2024-11-01 DIAGNOSIS — G47.9 SLEEP DISTURBANCE: ICD-10-CM

## 2024-11-01 DIAGNOSIS — E66.01 MORBID OBESITY WITH BMI OF 40.0-44.9, ADULT (H): ICD-10-CM

## 2024-11-01 DIAGNOSIS — R35.1 NOCTURIA: ICD-10-CM

## 2024-11-01 DIAGNOSIS — G47.00 INSOMNIA, UNSPECIFIED TYPE: ICD-10-CM

## 2024-11-01 PROCEDURE — 95810 POLYSOM 6/> YRS 4/> PARAM: CPT | Performed by: NURSE PRACTITIONER

## 2024-11-01 NOTE — Clinical Note
Naveed Zacarias, Hope you are doing well.  Please find the finalized PSG report in epic.  Patient has follow-up with you on November 25, 2024. Phylicia Nguyen

## 2024-11-08 LAB — SLPCOMP: NORMAL

## 2024-11-08 NOTE — PROCEDURES
"   SLEEP STUDY INTERPRETATION  DIAGNOSTIC POLYSOMNOGRAPHY REPORT      Patient: JAKE HARO  YOB: 1980  Study Date: 11/1/2024  MRN: [id number]  Referring Provider: Elmer Locke MD  Ordering Provider: Fox Burnett APRN CNP    Indications for Polysomnography: The patient is a 44-year-old Male who is 5' 9\" and weighs 282.0 lbs. His BMI is 41.8, Buckner sleepiness scale 8/24 and neck circumference is 47 cm. Relevant medical history includes HTN, Asthma, Major depression, ADHD, PTSD, Chronic bilateral low back pain, Recurrent ventral incisional hernia. Patient reports snoring, excessive sleepiness, fatigue, insomnia, and nocturia. A diagnostic polysomnogram was performed to evaluate for sleep apnea.    Polysomnogram Data: A full night polysomnogram recorded the standard physiologic parameters including EEG, EOG, EMG, ECG, nasal and oral airflow. Respiratory parameters of chest and abdominal movements were recorded with respiratory inductance plethysmography. Oxygen saturation was recorded by pulse oximetry. Hypopnea scoring rule used: 1B 4%.    Sleep Architecture:   The total recording time of the polysomnogram was 414.5 minutes. The total sleep time was 318.5 minutes. Sleep latency was decreased at 9.5 minutes without the use of a sleep aid. REM latency was 371.0 minutes. Arousal index was increased at 36.9 arousals per hour. Sleep efficiency was decreased at 76.8%. Wake after sleep onset was 86.5 minutes. The patient spent 17.3% of total sleep time in Stage N1, 73.3% in Stage N2, 8.3% in Stage N3, and 1.1% in REM. Time in REM supine was - minutes.    Respiration:   Events ? The polysomnogram revealed a presence of 9 obstructive, 2 centrals, and - mixed apneas resulting in an apnea index of 2.1 events per hour. There were 16 obstructive hypopneas and - central hypopneas resulting in an obstructive hypopnea index of 3.0 and central hypopnea index of - events per hour. The combined apnea/hypopnea index was " 5.1 events per hour (central apnea/hypopnea index was 0.4 events per hour). The REM AHI was 34.3 events per hour (REM sleep was seen for only 3.5 minutes). The supine AHI was 24.8 events per (supine sleep was seen for only 31.5 minutes). The RERA index was 0.9 events per hour.  The RDI was 6.0 events per hour.  Snoring - was reported as mild-moderate and intermittent, reported even during non-supine sleep.  Respiratory rate and pattern - was notable for normal respiratory rate and pattern.  Sustained Sleep Associated Hypoventilation - Transcutaneous carbon dioxide monitoring was not used, however significant hypoventilation was not suggested by oximetry.  Sleep Associated Hypoxemia - (Greater than 5 minutes O2 sat at or below 88%) was not present. Baseline oxygen saturation was 94.0%. Lowest oxygen saturation was 87.0%. Time spent less than or equal to 88% was 0.3 minutes. Time spent less than or equal to 89% was 0.6 minutes.    Movement Activity:    Periodic Limb Activity - There were - PLMs during the entire study. The PLM index was - movements per hour. The PLM Arousal Index was - per hour.  REM EMG Activity - Excessive transient/sustained muscle activity was not present.  Nocturnal Behavior - Abnormal sleep related behaviors were not noted during/arising out of NREM / REM sleep.   Bruxism - Not apparent.    Cardiac Summary:    The average pulse rate was 69.5 bpm. The minimum pulse rate was 59.0 bpm while the maximum pulse rate was 97.0 bpm.  Arrhythmias were not noted.     Assessment:   Sleep architecture was remarkable for sleep fragmentation (mostly due to spontaneous arousals), increased wake after sleep onset, with reduced sleep efficiency.  Both sleep stages N3 and REM sleep were reduced.  Mild ABDOUL was present, predominant during supine sleep, without sleep associated hypoxemia. Obstructive events were seen during the brief REM sleep of 3.5 minutes.  Since REM sleep was significant reduced, and supine REM  sleep was not seen, it is plausible that the overall severity of the sleep related breathing disorder may have been underestimated.  There were no periodic limb movements or abnormal sleep-related behaviors or arrythmias during the study.    Recommendations:  Suggest the following options for the treatment of obstructive sleep apnea and snoring: a) combination treatment using positional device like zzoma pillow or other devices of similar type during sleep and dental appliance through referral to Sleep Dentistry or b) empirically initiating auto titrating CPAP therapy with a range of 5-15 cmH2O and clinical follow up with sleep management team.  Advice regarding the risks of drowsy driving.  Suggest optimizing sleep schedule and avoiding sleep deprivation.  Weight management (if BMI > 30).  Pharmacologic therapy should be used for management of restless legs syndrome only if present and clinically indicated and not based on the presence of periodic limb movements alone.    Diagnostic Codes:   Obstructive Sleep Apnea G47.33  Repetitive Intrusions Into Sleep F51.8    11/1/2024 [facility] Sleep Study (282.0 lbs) - AHI 5.1, RDI 6.0, Supine AHI 24.8, REM AHI 34.3, Low O2 87.0%, Time Spent <=88% 0.3 minutes / Time Spent <=89% 0.6 minutes.     _____________________________________   Electronically Signed By: (Charan Gonzalez MD), 11/7/24

## 2024-11-14 ENCOUNTER — OFFICE VISIT (OUTPATIENT)
Dept: PULMONOLOGY | Facility: CLINIC | Age: 44
End: 2024-11-14
Payer: COMMERCIAL

## 2024-11-14 DIAGNOSIS — R06.02 SOB (SHORTNESS OF BREATH): ICD-10-CM

## 2024-11-14 DIAGNOSIS — Z87.891 FORMER SMOKER: ICD-10-CM

## 2024-11-14 LAB — HGB BLD-MCNC: 13.6 G/DL

## 2024-11-14 PROCEDURE — 85018 HEMOGLOBIN: CPT | Mod: QW

## 2024-11-15 LAB
DLCOCOR-%PRED-PRE: 144 %
DLCOCOR-PRE: 42.3 ML/MIN/MMHG
DLCOUNC-%PRED-PRE: 140 %
DLCOUNC-PRE: 41.06 ML/MIN/MMHG
DLCOUNC-PRED: 29.32 ML/MIN/MMHG
ERV-%PRED-PRE: 52 %
ERV-PRE: 0.87 L
ERV-PRED: 1.65 L
EXPTIME-PRE: 8.96 SEC
FEF2575-%PRED-PRE: 78 %
FEF2575-PRE: 2.8 L/SEC
FEF2575-PRED: 3.57 L/SEC
FEFMAX-%PRED-PRE: 113 %
FEFMAX-PRE: 11.09 L/SEC
FEFMAX-PRED: 9.79 L/SEC
FEV1-%PRED-PRE: 105 %
FEV1-PRE: 3.89 L
FEV1FEV6-PRE: 74 %
FEV1FEV6-PRED: 81 %
FEV1FVC-PRE: 73 %
FEV1FVC-PRED: 81 %
FEV1SVC-PRE: 71 %
FEV1SVC-PRED: 77 %
FIFMAX-PRE: 10.01 L/SEC
FRCPLETH-%PRED-PRE: 95 %
FRCPLETH-PRE: 3.24 L
FRCPLETH-PRED: 3.41 L
FVC-%PRED-PRE: 117 %
FVC-PRE: 5.34 L
FVC-PRED: 4.55 L
IC-%PRED-PRE: 139 %
IC-PRE: 4.59 L
IC-PRED: 3.3 L
RVPLETH-%PRED-PRE: 115 %
RVPLETH-PRE: 2.37 L
RVPLETH-PRED: 2.05 L
TLCPLETH-%PRED-PRE: 113 %
TLCPLETH-PRE: 7.83 L
TLCPLETH-PRED: 6.9 L
VA-%PRED-PRE: 111 %
VA-PRE: 7.14 L
VC-%PRED-PRE: 114 %
VC-PRE: 5.46 L
VC-PRED: 4.79 L

## 2024-11-18 ENCOUNTER — TELEPHONE (OUTPATIENT)
Dept: FAMILY MEDICINE | Facility: CLINIC | Age: 44
End: 2024-11-18
Payer: COMMERCIAL

## 2024-11-18 PROBLEM — R06.02 SOB (SHORTNESS OF BREATH): Status: ACTIVE | Noted: 2020-05-20

## 2024-11-25 ENCOUNTER — OFFICE VISIT (OUTPATIENT)
Dept: SLEEP MEDICINE | Facility: CLINIC | Age: 44
End: 2024-11-25
Payer: COMMERCIAL

## 2024-11-25 VITALS
DIASTOLIC BLOOD PRESSURE: 86 MMHG | WEIGHT: 286.6 LBS | OXYGEN SATURATION: 97 % | HEART RATE: 90 BPM | HEIGHT: 69 IN | BODY MASS INDEX: 42.45 KG/M2 | SYSTOLIC BLOOD PRESSURE: 133 MMHG

## 2024-11-25 DIAGNOSIS — G47.33 OSA (OBSTRUCTIVE SLEEP APNEA): Primary | ICD-10-CM

## 2024-11-25 DIAGNOSIS — I10 HYPERTENSION, UNSPECIFIED TYPE: ICD-10-CM

## 2024-11-25 DIAGNOSIS — E66.01 MORBID OBESITY WITH BMI OF 40.0-44.9, ADULT (H): ICD-10-CM

## 2024-11-25 PROCEDURE — 99214 OFFICE O/P EST MOD 30 MIN: CPT | Performed by: NURSE PRACTITIONER

## 2024-11-25 RX ORDER — METRONIDAZOLE 500 MG/1
500 TABLET ORAL 2 TIMES DAILY
COMMUNITY
Start: 2024-11-18 | End: 2024-11-25

## 2024-11-25 RX ORDER — CIPROFLOXACIN 500 MG/1
500 TABLET, FILM COATED ORAL 2 TIMES DAILY
COMMUNITY
Start: 2024-11-18 | End: 2024-11-25

## 2024-11-25 NOTE — PATIENT INSTRUCTIONS
"MY INFORMATION ON SLEEP APNEA-  [place]    DOCTOR : ANA Cleaning CNP  SLEEP CENTER :      MY CONTACT NUMBER:   [facility]  [phone]  [facility]   [phone]     [facility]  [phone]    DME:  [facility] [place] [address], [address]  [place] [address]  Phone: [phone]    Hours:  Mon - Fri: 8:00 a.m. - 4:30 p.m.  Sat: Closed  Sun: Closed    Key Points:  1. What is Obstructive Sleep Apnea (ABDOUL)? ABDOUL is the most common type of sleep apnea. Apnea literally means, \"without breath.\" It is characterized by repetitive pauses in breathing, despite continued effort to breathe, and is usually associated with a reduction in blood oxygen saturation. Apneas can last 10 to over 60 seconds. It is caused by narrowing or collapse of the upper airway as muscles relax during sleep.   2. What are the consequences of ABDOUL? Symptoms include: daytime sleepiness- possibly increasing the risk of falling asleep while driving, unrefreshing/restless sleep, snoring, insomnia, waking frequently to urinate, waking with heartburn or reflux, reduced concentration and memory, and morning headaches. Other health consequences may include development of high blood pressure. Untreated ABDOUL also can contribute to heart disease, stroke and diabetes.   3. What are the treatment options? In most situations, sleep apnea is a lifelong disease that must be managed with daily therapy. Continuous Positive Airway (CPAP) is the most reliable treatment. A mouthguard to hold your jaw forward is usually the next most reliable option. Other options include postioning devices (to keep you off your back), nasal valves, tongue retaining device, weight loss, surgery. There is more detail about these options toward the end of this document.  4. What are the most important things to remember about using CPAP?     WHERE CAN I FIND MORE " INFORMATION?    American Academy of Sleep Medicine Patient information on sleep disorders:  http://yoursleep.aasmnet.org    CPAP -  WHY AND HOW?                 Continuous positive airway pressure, or CPAP, is the most effective treatment for obstructive sleep apnea. It works by blowing room air, through a mask, to hold your throat open. A decision to use CPAP is a major step forward in the pursuit of a healthier life. The successful use of CPAP will help you breathe easier, sleep better and live healthier. Using CPAP can be a positive experience if you keep these bhardwaj points in mind:  Commitment  CPAP is not a quick fix for your problem. It involves a long-term commitment to improve your sleep and your health.    Communication  Stay in close communication with both your sleep doctor and your CPAP supplier. Ask lots of questions and seek help when you need it.    Consistency  Use CPAP all night, every night and for every nap. You will receive the maximum health benefits from CPAP when you use it every time that you sleep. This will also make it easier for your body to adjust to the treatment.    Correction  The first machine and mask that you try may not be the best ones for you. Work with your sleep doctor and your CPAP supplier to make corrections to your equipment selection. Ask about trying a different type of machine or mask if you have ongoing problems. Make sure that your mask is a good fit and learn to use your equipment properly.    Challenge  Tell a family member or close friend to ask you each morning if you used your CPAP the previous night. Have someone to challenge you to give it your best effort.    Connection   Your adjustment to CPAP will be easier if you are able to connect with others who use the same treatment. Ask your sleep doctor if there is a support group in your area for people who have sleep apnea, or look for one on the Internet.  Comfort   Increase your level of comfort by using a saline  "spray, decongestant or heated humidifier if CPAP irritates your nose, mouth or throat. Use your unit's \"ramp\" setting to slowly get used to the air pressure level. There may be soft pads you can buy that will fit over your mask straps. Look on www.CPAP.com for accessories that can help make CPAP use more comfortable.  Cleaning   Clean your mask, tubing and headgear on a regular basis. Put this time in your schedule so that you don't forget to do it. Check and replace the filters for your CPAP unit and humidifier.    Completion   Although you are never finished with CPAP therapy, you should reward yourself by celebrating the completion of your first month of treatment. Expect this first month to be your hardest period of adjustment. It will involve some trial and error as you find the machine, mask and pressure settings that are right for you.    Continuation  After your first month of treatment, continue to make a daily commitment to use your CPAP all night, every night and for every nap.    CPAP-Tips to starting with success:  Begin using your CPAP for short periods of time during the day while you watch TV or read.    Use CPAP every night and for every nap. Using it less often reduces the health benefits and makes it harder for your body to get used to it.    Newer CPAP models are virtually silent; however, if you find the sound of your CPAP machine to be bothersome, place the unit under your bed to dampen the sound.     Make small adjustments to your mask, tubing, straps and headgear until you get the right fit. Tightening the mask may actually worsen the leak.  If it leaves significant marks on your face or irritates the bridge of your nose, it may not be the best mask for you.  Speak with the person who supplied the mask and consider trying other masks. Insurances will allow you to try different masks during the first month of starting CPAP.  Insurance also covers a new mask, hose and filter about every 6 " months.    Use a saline nasal spray to ease mild nasal congestion. Neti-Pot or saline nasal rinses may also help. Nasal gel sprays can help reduce nasal dryness.  Biotene mouthwash can be helpful to protect your teeth if you experience frequent dry mouth.  Dry mouth may be a sign of air escaping out of your mouth or out of the mask in the case of a full face mask.  Speak with your provider if you expect that is the case.     Take a nasal decongestant to relieve more severe nasal or sinus congestion.  Do not use Afrin (oxymetazoline) nasal spray more than 3 days in a row.  Speak with your sleep doctor if your nasal congestion is chronic.    Use a heated humidifier that fits your CPAP model to enhance your breathing comfort. Adjust the heat setting up if you get a dry nose or throat, down if you get condensation in the hose or mask.  Position the CPAP lower than you so that any condensation in the hose drains back into the machine rather than towards the mask.    Try a system that uses nasal pillows if traditional masks give you problems.    Clean your mask, tubing and headgear once a week. Make sure the equipment dries fully.    Regularly check and replace the filters for your CPAP unit and humidifier.    Work closely with your sleep provider and your CPAP supplier to make sure that you have the machine, mask and air pressure setting that works best for you. It is better to stop using it and call your provider to solve problems than to lay awake all night frustrated with the device.  Weight Loss:    Weight loss decreases severity of sleep apnea in most people with obesity. For those with mild obesity who have developed snoring with weight gain, even 15-30 pound weight loss can improve and occasionally eliminate sleep apnea.  Structured and life-long dietary and health habits are necessary to lose weight and keep healthier weight levels.     Though there are significant health benefits from weight loss, long-term  weight loss is very difficult to achieve- studies show success with dietary management in less than 10% of people. In addition, substantial weight loss may require years of dietary control and may be difficult if patients have severe obesity. In these cases, surgical management may be considered.    If you are interested in methods for weight loss, you should review the options discussed at the National Institutes of Health patient information sites:     http://win.niddk.nih.gov/publications/index.htm  http:/www.health.nih.gov/topic/WeightLossDieting    Bariatric programs offer counseling in all methods of weight loss:    Http:/www.[facility].org/Specialties/WeightLossSurgeryandMedicalMgmt/htm    Your BMI is Body mass index is 42.45 kg/m .    Weight management plan: Patient was referred to their PCP to discuss a diet and exercise plan.    Body mass index (BMI) is one way to tell whether you are at a healthy weight, overweight, or obese. It measures your weight in relation to your height.  A BMI of 18.5 to 24.9 is in the healthy range. A person with a BMI of 25 to 29.9 is considered overweight, and someone with a BMI of 30 or greater is considered obese.  Another way to find out if you are at risk for health problems caused by overweight and obesity is to measure your waist. If you are a woman and your waist is more than 35 inches, or if you are a man and your waist is more than 40 inches, your risk of disease may be higher.  More than two-thirds of American adults are considered overweight or obese. Being overweight or obese increases the risk for further weight gain.  Excess weight may lead to heart disease and diabetes. Creating and following plans for healthy eating and physical activity may help you improve your health.    Methods for maintaining or losing weight.    Weight control is part of healthy lifestyle and includes exercise, emotional health, and healthy eating habits.  Careful eating habits  lifelong is the mainstay of weight control.  Though there are significant health benefits from weight loss, long-term weight loss with diet alone may be very difficult to achieve- studies show long-term success with dietary management in less than 10% of people. Attaining a healthy weight may be especially difficult to achieve in those with severe obesity. In some cases, medications, devices and surgical management might be considered.    What can you do?    If you are overweight or obese and are interested in methods for weight loss, you should discuss this with your provider. In addition, we recommend that you review healthy life styles and methods for weight loss available through the National Institutes of Health patient information sites:     http://win.niddk.nih.gov/publications/index.htm

## 2024-11-25 NOTE — PROGRESS NOTES
Sleep Study Follow-Up Visit:    Date on this visit: 11/25/2024    Yo East is a 44-year-old male with a PMH significant for HTN, Asthma, Major depression, ADHD, PTSD, Chronic bilateral low back pain, Recurrent ventral incisional hernia, and morbid obesity who comes in today for follow-up of his sleep study done on 11/01/2024 at the [facility] for possible sleep apnea.            These findings were reviewed with patient.     Past medical/surgical history, family history, social history, medications and allergies were reviewed.      Problem List:  Patient Active Problem List    Diagnosis Date Noted    ABDOUL (obstructive sleep apnea) 11/25/2024     Priority: Medium     11/1/2024 [facility] (282.0 lbs) - AHI 5.1, RDI 6.0, Supine AHI 24.8, REM AHI 34.3, Low O2 87.0%, Time Spent <=88% 0.3 minutes / Time Spent <=89% 0.6 minutes.       History of diverticulitis 10/18/2024     Priority: Medium    Constipation, unspecified constipation type 07/10/2024     Priority: Medium    Gastroesophageal reflux disease with esophagitis, unspecified whether hemorrhage 07/10/2024     Priority: Medium    History of methamphetamine use 07/10/2024     Priority: Medium    Varicose veins of right lower extremity, unspecified whether complicated 03/19/2024     Priority: Medium     Right thigh, chronic, mild pain sometimes. Will monitor.      Class 3 severe obesity without serious comorbidity with body mass index (BMI) of 40.0 to 44.9 in adult, unspecified obesity type (H) 02/12/2024     Priority: Medium    History of ADHD 02/12/2024     Priority: Medium     Dx as a kid. Will await records.      History of psychosis 02/12/2024     Priority: Medium     Hospitalization in [facility] 5/2023 for psychosis.  Brought in by the police.  Resolved screen positive for methamphetamine.  Not clear if this was caused.      Excessive sleepiness 02/12/2024     Priority: Medium    Chronic bilateral low  back pain without sciatica 02/12/2024     Priority: Medium    Hypertriglyceridemia 02/12/2024     Priority: Medium    Recurrent ventral incisional hernia 06/09/2023     Priority: Medium    Carpal tunnel syndrome of left wrist 07/04/2021     Priority: Medium    HTN (hypertension) 07/04/2021     Priority: Medium    PTSD (post-traumatic stress disorder) 07/04/2021     Priority: Medium    S/P repair of ventral hernia 05/20/2020     Priority: Medium    Mild intermittent asthma 05/20/2020     Priority: Medium     PFT 11/14/24:  IMPRESSION:   Normal spirometry and lung volumes.   The diffusing capacity for CO is supranormal. This can be seen in physiologic states with increased pulmonary blood flow, such as with pregnancy, asthma, obesity, etc. Clinical correlation is recommended.         Current Outpatient Medications   Medication Sig Dispense Refill    acetaminophen (TYLENOL) 500 MG tablet Take 1-2 tablets (500-1,000 mg) by mouth every 8 hours as needed for mild pain 90 tablet 3    buPROPion (WELLBUTRIN XL) 300 MG 24 hr tablet Take 1 tablet (300 mg) by mouth every morning 90 tablet 1    ciprofloxacin (CIPRO) 500 MG tablet Take 500 mg by mouth 2 times daily.      cyclobenzaprine (FLEXERIL) 5 MG tablet Take 1 tablet (5 mg) by mouth 3 times daily as needed for muscle spasms 30 tablet 3    DULoxetine (CYMBALTA) 30 MG capsule Take 1 capsule (30 mg) by mouth 2 times daily 180 capsule 0    ibuprofen (ADVIL/MOTRIN) 600 MG tablet Take 1 tablet (600 mg) by mouth every 6 hours as needed for moderate pain 30 tablet 1    metroNIDAZOLE (FLAGYL) 500 MG tablet Take 500 mg by mouth 2 times daily.      OLANZapine (ZYPREXA) 10 MG tablet Take 1 tablet (10 mg) by mouth at bedtime 90 tablet 1    omeprazole (PRILOSEC) 20 MG DR capsule Take 1 capsule (20 mg) by mouth daily 30 capsule 3    polyethylene glycol (MIRALAX) 17 GM/Dose powder Take 17 g (1 Capful) by mouth daily 510 g 2    senna (SENOKOT) 8.6 MG tablet Take 1 tablet by mouth 2 times  "daily as needed for constipation. 120 tablet 1    VENTOLIN  (90 Base) MCG/ACT inhaler Inhale 2 puffs into the lungs every 4 hours as needed for shortness of breath, wheezing or cough. 18 g 1     No current facility-administered medications for this visit.     /86   Pulse 90   Ht 1.75 m (5' 8.9\")   Wt 130 kg (286 lb 9.6 oz)   SpO2 97%   BMI 42.45 kg/m      Impression/Plan:  Mild, predominant during supine sleep, Obstructive Sleep Apnea.   - Sleep associated hypoxemia was not present.  - No periodic limb movements or abnormal sleep-related behaviors or arrythmias during the study.   Hypertension, unspecified type  Morbid obesity with BMI of 40.0-44.9, adult (H)  - Comprehensive DME     Treatment options discussed today including:  auto-CPAP at 5-15 cmH2O, or combination treatment of oral appliance therapy and positional therapy.  Additionally, we also discussed the role of body weight with regard to ABDOUL and I have recommended weight loss as the patient is able.    Elected treatment with auto-CPAP at 5-15 cmH2O. A comprehensive DME order was placed for new APAP device with pressure setting 5-15 cm H2O, mask and supplies sent to [facility], today, per patient request.  We also discussed usual use/compliance requirements associated with new PAP device therapy.    He will follow up with me in about 2 month(s) after obtaining new APAP device to review download data and use/compliance.     35 minutes spent with patient, all of which were spent face-to-face counseling, consulting, chart review/documentation, and coordinating plan of care on the date of the encounter.      ANA Cleaning CNP  Sleep Medicine      CC: Cornelia Payne     This note was written with the assistance of the Dragon voice-dictation technology software. The final document, although reviewed, may contain errors. For corrections, please contact the office.    "

## 2024-12-02 PROBLEM — G47.10 EXCESSIVE SLEEPINESS: Status: RESOLVED | Noted: 2024-02-12 | Resolved: 2024-12-02

## 2024-12-02 NOTE — ASSESSMENT & PLAN NOTE
Saw Sleep Medicine 11/25/24:  Elected treatment with auto-CPAP at 5-15 cmH2O. A comprehensive DME order was placed for new APAP device with pressure setting 5-15 cm H2O, mask and supplies sent to [facility], today, per patient request.  We also discussed usual use/compliance requirements associated with new PAP device therapy.  He will follow up with me in about 2 month(s) after obtaining new APAP device to review download data and use/compliance.

## 2024-12-02 NOTE — PATIENT INSTRUCTIONS
How to Take Your Medication Before Surgery  Preoperative Medication Instructions   Antiplatelet or Anticoagulation Medication Instructions   - Patient is on no antiplatelet or anticoagulation medications.    Additional Medication Instructions   - fiber, laxatives: DO NOT TAKE day of surgery   - ibuprofen (Advil, Motrin): DO NOT TAKE 1 day before surgery.    - SSRIs, SNRIs, TCAs, Antipsychotics: Continue without modification.    - LABA, inhaled corticosteroid, long-acting anticholinergics: Continue without modification.       Patient Education   Preparing for Your Surgery  For Adults  Getting started  In most cases, a nurse will call to review your health history and instructions. They will give you an arrival time based on your scheduled surgery time. Please be ready to share:  Your doctor's clinic name and phone number  Your medical, surgical, and anesthesia history  A list of allergies and sensitivities  A list of medicines, including herbal treatments and over-the-counter drugs  Whether the patient has a legal guardian (ask how to send us the papers in advance)  Note: You may not receive a call if you were seen at our PAC (Preoperative Assessment Center).  Please tell us if you're pregnant--or if there's any chance you might be pregnant. Some surgeries may injure a fetus (unborn baby), so they require a pregnancy test. Surgeries that are safe for a fetus don't always need a test, and you can choose whether to have one.   Preparing for surgery  Within 10 to 30 days of surgery: Have a pre-op exam (sometimes called an H&P, or History and Physical). This can be done at a clinic or pre-operative center.  If you're having a , you may not need this exam. Talk to your care team.  At your pre-op exam, talk to your care team about all medicines you take. (This includes CBD oil and any drugs, such as THC, marijuana, and other forms of cannabis.) If you need to stop any medicine before surgery, ask when to start  taking it again.  This is for your safety. Many medicines and drugs can make you bleed too much during surgery. Some change how well surgery (anesthesia) drugs work.  Call your insurance company to let them know you're having surgery. (If you don't have insurance, call [phone].)  Call your clinic if there's any change in your health. This includes a scrape or scratch near the surgery site, or any signs of a cold (sore throat, runny nose, cough, rash, fever).  Eating and drinking guidelines  For your safety: Unless your surgeon tells you otherwise, follow the guidelines below.  Eat and drink as normal until 8 hours before you arrive for surgery. After that, no food or milk. You can spit out gum when you arrive.  Drink clear liquids until 2 hours before you arrive. These are liquids you can see through, like water, Gatorade, and Propel Water. They also include plain black coffee and tea (no cream or milk).  No alcohol for 24 hours before you arrive. The night before surgery, stop any drinks that contain THC.  If your care team tells you to take medicine on the morning of surgery, it's okay to take it with a sip of water. No other medicines or drugs are allowed (including CBD oil)--follow your care team's instructions.  If you have questions the day of surgery, call your hospital or surgery center.   Preventing infection  Shower or bathe the night before and the morning of surgery. Follow the instructions your clinic gave you. (If no instructions, use regular soap.)  Don't shave or clip hair near your surgery site. We'll remove the hair if needed.  Don't smoke or vape the morning of surgery. No chewing tobacco for 6 hours before you arrive. A nicotine patch is okay. You may spit out nicotine gum when you arrive.  For some surgeries, the surgeon will tell you to fully quit smoking and nicotine.  We will make every effort to keep you safe from infection. We will:  Clean our hands often with soap and water (or an  alcohol-based hand rub).  Clean the skin at your surgery site with a special soap that kills germs.  Give you a special gown to keep you warm. (Cold raises the risk of infection.)  Wear hair covers, masks, gowns, and gloves during surgery.  Give antibiotic medicine, if prescribed. Not all surgeries need this medicine.  What to bring on the day of surgery  Photo ID and insurance card  Copy of your health care directive, if you have one  Glasses and hearing aids (bring cases)  You can't wear contacts during surgery  Inhaler and eye drops, if you use them (tell us about these when you arrive)  CPAP machine or breathing device, if you use them  A few personal items, if spending the night  If you have . . .  A pacemaker, ICD (cardiac defibrillator), or other implant: Bring the ID card.  An implanted stimulator: Bring the remote control.  A legal guardian: Bring a copy of the certified (court-stamped) guardianship papers.  Please remove any jewelry, including body piercings. Leave jewelry and other valuables at home.  If you're going home the day of surgery  You must have a responsible adult drive you home. They should stay with you overnight as well.  If you don't have someone to stay with you, and you aren't safe to go home alone, we may keep you overnight. Insurance often won't pay for this.  After surgery  If it's hard to control your pain or you need more pain medicine, please call your surgeon's office.  Questions?   If you have any questions for your care team, list them here:   ____________________________________________________________________________________________________________________________________________________________________________________________________________________________________________________________  For informational purposes only. Not to replace the advice of your health care provider. Copyright   2003, 2019 [facility]. All rights reserved. Clinically reviewed by Timothy  MD Lexi. InboxQ [id number] - REV 08/24.

## 2024-12-02 NOTE — ASSESSMENT & PLAN NOTE
Saw Sleep Medicine 11/25/24:  Elected treatment with auto-CPAP at 5-15 cmH2O. A comprehensive DME order was placed for new APAP device with pressure setting 5-15 cm H2O, mask and supplies sent to [facility], today, per patient request.  We also discussed usual use/compliance requirements associated with new PAP device therapy.  He will follow up with me in about 2 month(s) after obtaining new APAP device to review download data and use/compliance.

## 2024-12-03 ENCOUNTER — OFFICE VISIT (OUTPATIENT)
Dept: FAMILY MEDICINE | Facility: CLINIC | Age: 44
End: 2024-12-03
Payer: COMMERCIAL

## 2024-12-03 VITALS
DIASTOLIC BLOOD PRESSURE: 79 MMHG | SYSTOLIC BLOOD PRESSURE: 136 MMHG | TEMPERATURE: 97.7 F | RESPIRATION RATE: 16 BRPM | HEART RATE: 64 BPM | OXYGEN SATURATION: 98 % | WEIGHT: 283 LBS | BODY MASS INDEX: 41.92 KG/M2 | HEIGHT: 69 IN

## 2024-12-03 DIAGNOSIS — F43.10 PTSD (POST-TRAUMATIC STRESS DISORDER): ICD-10-CM

## 2024-12-03 DIAGNOSIS — Z86.59 HISTORY OF DEPRESSION: ICD-10-CM

## 2024-12-03 DIAGNOSIS — Z86.59 HISTORY OF PSYCHOSIS: ICD-10-CM

## 2024-12-03 DIAGNOSIS — Z98.890 S/P REPAIR OF VENTRAL HERNIA: ICD-10-CM

## 2024-12-03 DIAGNOSIS — Z23 NEEDS FLU SHOT: ICD-10-CM

## 2024-12-03 DIAGNOSIS — Z87.19 S/P REPAIR OF VENTRAL HERNIA: ICD-10-CM

## 2024-12-03 DIAGNOSIS — G89.29 CHRONIC BILATERAL LOW BACK PAIN WITHOUT SCIATICA: ICD-10-CM

## 2024-12-03 DIAGNOSIS — K21.00 GASTROESOPHAGEAL REFLUX DISEASE WITH ESOPHAGITIS, UNSPECIFIED WHETHER HEMORRHAGE: ICD-10-CM

## 2024-12-03 DIAGNOSIS — J45.20 MILD INTERMITTENT ASTHMA WITHOUT COMPLICATION: ICD-10-CM

## 2024-12-03 DIAGNOSIS — Z23 NEED FOR HEPATITIS B VACCINATION: ICD-10-CM

## 2024-12-03 DIAGNOSIS — E66.01 CLASS 3 SEVERE OBESITY WITHOUT SERIOUS COMORBIDITY WITH BODY MASS INDEX (BMI) OF 40.0 TO 44.9 IN ADULT, UNSPECIFIED OBESITY TYPE (H): ICD-10-CM

## 2024-12-03 DIAGNOSIS — Z72.89 ENGAGES IN VAPING: ICD-10-CM

## 2024-12-03 DIAGNOSIS — F41.1 GAD (GENERALIZED ANXIETY DISORDER): ICD-10-CM

## 2024-12-03 DIAGNOSIS — E66.813 CLASS 3 SEVERE OBESITY WITHOUT SERIOUS COMORBIDITY WITH BODY MASS INDEX (BMI) OF 40.0 TO 44.9 IN ADULT, UNSPECIFIED OBESITY TYPE (H): ICD-10-CM

## 2024-12-03 DIAGNOSIS — Z01.818 PREOP GENERAL PHYSICAL EXAM: Primary | ICD-10-CM

## 2024-12-03 DIAGNOSIS — E78.1 HYPERTRIGLYCERIDEMIA: ICD-10-CM

## 2024-12-03 DIAGNOSIS — Z23 NEED FOR COVID-19 VACCINE: ICD-10-CM

## 2024-12-03 DIAGNOSIS — M54.50 CHRONIC BILATERAL LOW BACK PAIN WITHOUT SCIATICA: ICD-10-CM

## 2024-12-03 DIAGNOSIS — G47.33 OSA (OBSTRUCTIVE SLEEP APNEA): ICD-10-CM

## 2024-12-03 DIAGNOSIS — K59.00 CONSTIPATION, UNSPECIFIED CONSTIPATION TYPE: ICD-10-CM

## 2024-12-03 DIAGNOSIS — K43.2 RECURRENT VENTRAL INCISIONAL HERNIA: ICD-10-CM

## 2024-12-03 PROBLEM — I10 HTN (HYPERTENSION): Status: RESOLVED | Noted: 2021-07-04 | Resolved: 2024-12-03

## 2024-12-03 LAB
ERYTHROCYTE [DISTWIDTH] IN BLOOD BY AUTOMATED COUNT: 12.4 % (ref 10–15)
HCT VFR BLD AUTO: 45.3 % (ref 40–53)
HGB BLD-MCNC: 14.8 G/DL (ref 13.3–17.7)
MCH RBC QN AUTO: 27.3 PG (ref 26.5–33)
MCHC RBC AUTO-ENTMCNC: 32.7 G/DL (ref 31.5–36.5)
MCV RBC AUTO: 83 FL (ref 78–100)
PLATELET # BLD AUTO: 267 10E3/UL (ref 150–450)
RBC # BLD AUTO: 5.43 10E6/UL (ref 4.4–5.9)
WBC # BLD AUTO: 10.1 10E3/UL (ref 4–11)

## 2024-12-03 PROCEDURE — 99214 OFFICE O/P EST MOD 30 MIN: CPT | Performed by: STUDENT IN AN ORGANIZED HEALTH CARE EDUCATION/TRAINING PROGRAM

## 2024-12-03 PROCEDURE — G2211 COMPLEX E/M VISIT ADD ON: HCPCS | Performed by: STUDENT IN AN ORGANIZED HEALTH CARE EDUCATION/TRAINING PROGRAM

## 2024-12-03 PROCEDURE — 80048 BASIC METABOLIC PNL TOTAL CA: CPT | Performed by: STUDENT IN AN ORGANIZED HEALTH CARE EDUCATION/TRAINING PROGRAM

## 2024-12-03 PROCEDURE — 36415 COLL VENOUS BLD VENIPUNCTURE: CPT | Performed by: STUDENT IN AN ORGANIZED HEALTH CARE EDUCATION/TRAINING PROGRAM

## 2024-12-03 PROCEDURE — 85027 COMPLETE CBC AUTOMATED: CPT | Performed by: STUDENT IN AN ORGANIZED HEALTH CARE EDUCATION/TRAINING PROGRAM

## 2024-12-03 RX ORDER — SENNOSIDES A AND B 8.6 MG/1
1 TABLET, FILM COATED ORAL 2 TIMES DAILY PRN
Qty: 120 TABLET | Refills: 1 | Status: SHIPPED | OUTPATIENT
Start: 2024-12-03

## 2024-12-03 NOTE — PROGRESS NOTES
Preoperative Evaluation  [facility] [address] [place] [address]  Phone: [phone]  Fax: [phone]  Primary Provider: Cornelia Payne MD  Pre-op Performing Provider: Cornelia Payne MD  Dec 3, 2024             12/3/2024   Surgical Information   What procedure is being done? abdominal reconstruction    Facility or Hospital where procedure/surgery will be performed: [facility]    Who is doing the procedure / surgery? Dr. Horacio Lepe    Date of surgery / procedure: December 18th    Time of surgery / procedure: Do not know yet    Where do you plan to recover after surgery? Other -         Patient-reported     Fax number for surgical facility: [phone]    Assessment & Plan     The proposed surgical procedure is considered INTERMEDIATE risk.    Yo was seen today for pre-op exam.    Diagnoses and all orders for this visit:    Preop general physical exam  Recurrent ventral incisional hernia  S/P repair of ventral hernia  Complicated history of recurrent ventral hernia s/p repair.  No contraindications to anesthesia.  Medically optimized for procedure.  Approval to proceed.  Will check labs below and inform surgeon if abnormal.  -     CBC with platelets; Future  -     Basic metabolic panel  (Ca, Cl, CO2, Creat, Gluc, K, Na, BUN); Future  -     CBC with platelets  -     Basic metabolic panel  (Ca, Cl, CO2, Creat, Gluc, K, Na, BUN)    Gastroesophageal reflux disease with esophagitis, unspecified whether hemorrhage  Chronic, well-controlled with omeprazole.  Continue.    Class 3 severe obesity without serious comorbidity with body mass index (BMI) of 40.0 to 44.9 in adult, unspecified obesity type (H)  Chronic, discussed lifestyle.  Did try a trial of semaglutide, however patient had worsening constipation.  Discontinued.  Continue to work on lifestyle.    Constipation, unspecified constipation type  Chronic, well-controlled with MiraLAX and Senokot.  Will hold laxatives a day prior  to surgery.  -     senna (SENOKOT) 8.6 MG tablet; Take 1 tablet by mouth 2 times daily as needed for constipation.    ABDOUL (obstructive sleep apnea)  Follows with sleep medicine.  Recently diagnosed.  Does need a CPAP machine, however has not obtained it yet.    Chronic bilateral low back pain without sciatica  Chronic, no red flag symptoms.  On duloxetine.    Mild intermittent asthma without complication  Chronic, well-controlled on albuterol as needed.    Hypertriglyceridemia  Chronic, stable.  Not on medication.  Work on lifestyle.    GARDENIA (generalized anxiety disorder)  PTSD (post-traumatic stress disorder)  History of depression  History of psychosis  Symptoms well-controlled.  On Wellbutrin, duloxetine and olanzapine.    Needs flu shot  Need for COVID-19 vaccine  Need for hepatitis B vaccination  Discussed, patient would like to defer.    Engages in vaping  Comments:  encouraged to stop. Has stopped since yesterday.                    - No identified additional risk factors other than previously addressed    Antiplatelet or Anticoagulation Medication Instructions   - Patient is on no antiplatelet or anticoagulation medications.    Additional Medication Instructions   - fiber, laxatives: DO NOT TAKE day of surgery   - ibuprofen (Advil, Motrin): DO NOT TAKE 1 day before surgery.    - SSRIs, SNRIs, TCAs, Antipsychotics: Continue without modification.    - LABA, inhaled corticosteroid, long-acting anticholinergics: Continue without modification.    Recommendation  Approval given to proceed with proposed procedure, without further diagnostic evaluation.    Zechariah Ram is a 44 year old, presenting for the following:  Pre-Op Exam        3/19/2024     1:10 PM 8/8/2024    10:21 AM 9/6/2024    10:05 AM   GARDENIA-7 SCORE   Total Score 4 (minimal anxiety) 2 (minimal anxiety) 7 (mild anxiety)   Total Score 4 2 7           7/12/2024    10:15 AM 8/8/2024    10:23 AM 9/6/2024    11:09 AM   PHQ   PHQ-9 Total Score 6 3 4    Q9: Thoughts of better off dead/self-harm past 2 weeks Not at all  Not at all Not at all       Patient-reported           12/3/2024     2:06 PM   Additional Questions   Roomed by eh   Accompanied by self     HPI related to upcoming procedure:         12/3/2024   Pre-Op Questionnaire   Have you ever had a heart attack or stroke? No    Have you ever had surgery on your heart or blood vessels, such as a stent placement, a coronary artery bypass, or surgery on an artery in your head, neck, heart, or legs? No    Do you have chest pain with activity? No    Do you have a history of heart failure? No    Do you currently have a cold, bronchitis or symptoms of other infection? No    Do you have a cough, shortness of breath, or wheezing? No    Do you or anyone in your family have previous history of blood clots? No    Do you or does anyone in your family have a serious bleeding problem such as prolonged bleeding following surgeries or cuts? No    Have you ever had problems with anemia or been told to take iron pills? No    Have you had any abnormal blood loss such as black, tarry or bloody stools? No    Have you ever had a blood transfusion? No    Are you willing to have a blood transfusion if it is medically needed before, during, or after your surgery? Yes    Have you or any of your relatives ever had problems with anesthesia? No    Do you have sleep apnea, excessive snoring or daytime drowsiness? (!) YES    Do you have a CPAP machine? (!) NO - pt reports he will be getting machine soon.    Do you have any artifical heart valves or other implanted medical devices like a pacemaker, defibrillator, or continuous glucose monitor? No    Do you have artificial joints? No    Are you allergic to latex? No        Patient-reported     Health Care Directive  Patient does not have a Health Care Directive: Discussed advance care planning with patient; information given to patient to review.    Preoperative Review of    reviewed -  no record of controlled substances prescribed.          Patient Active Problem List    Diagnosis Date Noted    ABDOUL (obstructive sleep apnea) 11/25/2024     Priority: Medium     11/1/2024 [facility] Study (282.0 lbs) - AHI 5.1, RDI 6.0, Supine AHI 24.8, REM AHI 34.3, Low O2 87.0%, Time Spent <=88% 0.3 minutes / Time Spent <=89% 0.6 minutes.       History of diverticulitis 10/18/2024     Priority: Medium    Constipation, unspecified constipation type 07/10/2024     Priority: Medium    Gastroesophageal reflux disease with esophagitis, unspecified whether hemorrhage 07/10/2024     Priority: Medium    History of methamphetamine use 07/10/2024     Priority: Medium    Varicose veins of right lower extremity, unspecified whether complicated 03/19/2024     Priority: Medium     Right thigh, chronic, mild pain sometimes. Will monitor.      Class 3 severe obesity without serious comorbidity with body mass index (BMI) of 40.0 to 44.9 in adult, unspecified obesity type (H) 02/12/2024     Priority: Medium    History of ADHD 02/12/2024     Priority: Medium     Dx as a kid. Will await records.      History of psychosis 02/12/2024     Priority: Medium     Hospitalization in [facility] 5/2023 for psychosis.  Brought in by the police.  Resolved screen positive for methamphetamine.  Not clear if this was caused.      Chronic bilateral low back pain without sciatica 02/12/2024     Priority: Medium    Hypertriglyceridemia 02/12/2024     Priority: Medium    Recurrent ventral incisional hernia 06/09/2023     Priority: Medium    Carpal tunnel syndrome of left wrist 07/04/2021     Priority: Medium    HTN (hypertension) 07/04/2021     Priority: Medium    PTSD (post-traumatic stress disorder) 07/04/2021     Priority: Medium    S/P repair of ventral hernia 05/20/2020     Priority: Medium    Mild intermittent asthma 05/20/2020     Priority: Medium     PFT 11/14/24:  IMPRESSION:   Normal spirometry and lung volumes.   The  diffusing capacity for CO is supranormal. This can be seen in physiologic states with increased pulmonary blood flow, such as with pregnancy, asthma, obesity, etc. Clinical correlation is recommended.         Past Medical History:   Diagnosis Date    Asthma     Excessive sleepiness 02/12/2024     Past Surgical History:   Procedure Laterality Date    COLON SURGERY      COLON SURGERY N/A     HERNIORRHAPHY VENTRAL N/A 5/20/2020    Procedure: REPAIR RECURRENT VENTRAL HERNIA;  Surgeon: Jong Juan MD;  Location: [facility] OR;  Service: General     Current Outpatient Medications   Medication Sig Dispense Refill    acetaminophen (TYLENOL) 500 MG tablet Take 1-2 tablets (500-1,000 mg) by mouth every 8 hours as needed for mild pain 90 tablet 3    buPROPion (WELLBUTRIN XL) 300 MG 24 hr tablet Take 1 tablet (300 mg) by mouth every morning 90 tablet 1    cyclobenzaprine (FLEXERIL) 5 MG tablet Take 1 tablet (5 mg) by mouth 3 times daily as needed for muscle spasms 30 tablet 3    DULoxetine (CYMBALTA) 30 MG capsule Take 1 capsule (30 mg) by mouth 2 times daily 180 capsule 0    ibuprofen (ADVIL/MOTRIN) 600 MG tablet Take 1 tablet (600 mg) by mouth every 6 hours as needed for moderate pain 30 tablet 1    OLANZapine (ZYPREXA) 10 MG tablet Take 1 tablet (10 mg) by mouth at bedtime 90 tablet 1    omeprazole (PRILOSEC) 20 MG DR capsule Take 1 capsule (20 mg) by mouth daily 30 capsule 3    polyethylene glycol (MIRALAX) 17 GM/Dose powder Take 17 g (1 Capful) by mouth daily 510 g 2    senna (SENOKOT) 8.6 MG tablet Take 1 tablet by mouth 2 times daily as needed for constipation. 120 tablet 1    VENTOLIN  (90 Base) MCG/ACT inhaler Inhale 2 puffs into the lungs every 4 hours as needed for shortness of breath, wheezing or cough. 18 g 1       Allergies   Allergen Reactions    Amoxicillin Hives    Tetracyclines & Related Hives        Social History     Tobacco Use    Smoking status: Former     Current packs/day: 0.00      "Types: Cigarettes     Quit date: 2018     Years since quittin.9     Passive exposure: Current    Smokeless tobacco: Never    Tobacco comments:     Pt in the process of quitting    Substance Use Topics    Alcohol use: Never     Family History   Problem Relation Age of Onset    Clotting Disorder No family hx of      History   Drug Use Unknown               Objective    /79 (BP Location: Left arm, Patient Position: Sitting, Cuff Size: Adult Large)   Pulse 64   Temp 97.7  F (36.5  C) (Temporal)   Resp 16   Ht 1.75 m (5' 8.9\")   Wt 128.4 kg (283 lb)   SpO2 98%   BMI 41.92 kg/m     Estimated body mass index is 41.92 kg/m  as calculated from the following:    Height as of this encounter: 1.75 m (5' 8.9\").    Weight as of this encounter: 128.4 kg (283 lb).  Physical Exam  GENERAL: alert and no distress  EYES: Eyes grossly normal to inspection, PERRL and conjunctivae and sclerae normal  HENT: ear canals and TM's normal, nose and mouth without ulcers or lesions  NECK: no adenopathy, no asymmetry, masses, or scars  RESP: lungs clear to auscultation - no rales, rhonchi or wheezes  CV: regular rate and rhythm, normal S1 S2, no S3 or S4, no murmur, click or rub, no peripheral edema  ABDOMEN: soft, nontender, no hepatosplenomegaly, no masses and bowel sounds normal.  Ventral hernia, reducible.  MS: no gross musculoskeletal defects noted, no edema  SKIN: no suspicious lesions or rashes  NEURO: Normal strength and tone, mentation intact and speech normal  PSYCH: mentation appears normal, affect normal/bright    Recent Labs   Lab Test 24  1412 07/10/24  1029 24  1220   HGB 13.6 15.3 14.7   PLT  --   --  250   NA  --   --  140   POTASSIUM  --   --  4.4   CR  --   --  0.77        Diagnostics  Labs pending at this time.  Results will be reviewed when available.   No EKG required, no history of coronary heart disease, significant arrhythmia, peripheral arterial disease or other structural heart " disease.    Revised Cardiac Risk Index (RCRI)  The patient has the following serious cardiovascular risks for perioperative complications:   - No serious cardiac risks = 0 points     RCRI Interpretation: 0 points: Class I (very low risk - 0.4% complication rate)         Signed Electronically by: Cornelia Payne MD  A copy of this evaluation report is provided to the requesting physician.       Prior to immunization administration, verified patients identity using patient s name and date of birth. Please see Immunization Activity for additional information.     Screening Questionnaire for Adult Immunization    Are you sick today?   No   Do you have allergies to medications, food, a vaccine component or latex?   Yes   Have you ever had a serious reaction after receiving a vaccination?   No   Do you have a long-term health problem with heart, lung, kidney, or metabolic disease (e.g., diabetes), asthma, a blood disorder, no spleen, complement component deficiency, a cochlear implant, or a spinal fluid leak?  Are you on long-term aspirin therapy?   Yes   Do you have cancer, leukemia, HIV/AIDS, or any other immune system problem?   No   Do you have a parent, brother, or sister with an immune system problem?   No   In the past 3 months, have you taken medications that affect  your immune system, such as prednisone, other steroids, or anticancer drugs; drugs for the treatment of rheumatoid arthritis, Crohn s disease, or psoriasis; or have you had radiation treatments?   No   Have you had a seizure, or a brain or other nervous system problem?   No   During the past year, have you received a transfusion of blood or blood    products, or been given immune (gamma) globulin or antiviral drug?   No   For women: Are you pregnant or is there a chance you could become       pregnant during the next month?   No   Have you received any vaccinations in the past 4 weeks?   No     Immunization questionnaire was positive for at least one  answer.  Notified provider.      Patient instructed to remain in clinic for 15 minutes afterwards, and to report any adverse reactions.     Screening performed by Lakhwinder Garcia MA on 12/3/2024 at 2:08 PM.

## 2024-12-04 ENCOUNTER — TELEPHONE (OUTPATIENT)
Dept: FAMILY MEDICINE | Facility: CLINIC | Age: 44
End: 2024-12-04
Payer: COMMERCIAL

## 2024-12-04 LAB
ANION GAP SERPL CALCULATED.3IONS-SCNC: 11 MMOL/L (ref 7–15)
BUN SERPL-MCNC: 11.5 MG/DL (ref 6–20)
CALCIUM SERPL-MCNC: 9.6 MG/DL (ref 8.8–10.4)
CHLORIDE SERPL-SCNC: 100 MMOL/L (ref 98–107)
CREAT SERPL-MCNC: 0.89 MG/DL (ref 0.67–1.17)
EGFRCR SERPLBLD CKD-EPI 2021: >90 ML/MIN/1.73M2
GLUCOSE SERPL-MCNC: 86 MG/DL (ref 70–99)
HCO3 SERPL-SCNC: 29 MMOL/L (ref 22–29)
POTASSIUM SERPL-SCNC: 4.2 MMOL/L (ref 3.4–5.3)
SODIUM SERPL-SCNC: 140 MMOL/L (ref 135–145)

## 2024-12-04 NOTE — TELEPHONE ENCOUNTER
----- Message from Cornelia Payne sent at 12/4/2024 12:04 PM CST -----  Blood counts, electrolytes, kidney function were all normal

## 2024-12-09 ENCOUNTER — TELEPHONE (OUTPATIENT)
Dept: FAMILY MEDICINE | Facility: CLINIC | Age: 44
End: 2024-12-09
Payer: COMMERCIAL

## 2024-12-09 NOTE — TELEPHONE ENCOUNTER
Forms/Letter Request    Type of form/letter: OTHER: [employer], [employer], Transportation mileage Exception form       Do we have the form/letter: Yes:     Who is the form from? Insurance comp-[employer], [employer], Transportation mileage Exception form    Where did/will the form come from? form was faxed in    When is form/letter needed by: asap    How would you like the form/letter returned: Fax : [phone]

## 2024-12-11 NOTE — TELEPHONE ENCOUNTER
Writer took incoming call back from CÉSAR Ortiz with [employer] regarding incomplete form received.    Missing a few things:  1) Date form was filled out  2) Appointment frequency  3) Individual requesting mileage exception (Dr. Payne's information)  4) Date span  of referral(How long referral will be good for?)  Member is trying to   5) [facility] versus [facility] (was listed in the form, but patient's surgery is at the surgical center)    Diana will be re faxing over a Blank form to be filled out by Dr. Payne again. Unless of [facility] has the old form and complete the missing items above.    Routing message to [facility] to look out for incoming fax from [facility] [employer] and assist with Forms process again. Routing to Dr. Payne as JEM.    Jenniffer Robert, GEORGEN, RN, PHN   [facility]

## 2024-12-11 NOTE — TELEPHONE ENCOUNTER
Form completed by provider and faxed back to [employer] [place]. Sent to [employer] for scanning. Completing task.  Ginny Shahid

## 2024-12-13 ENCOUNTER — TELEPHONE (OUTPATIENT)
Dept: FAMILY MEDICINE | Facility: CLINIC | Age: 44
End: 2024-12-13

## 2024-12-13 NOTE — TELEPHONE ENCOUNTER
This is a lab that needs to be sent out. Lab will check on how long it will take and keep me posted

## 2024-12-13 NOTE — TELEPHONE ENCOUNTER
General Call      Reason for Call:  Lab result    What are your questions or concerns:  [facility] called to have pt lab results fax to them ASAP as it comes out. Pt has surgery on Wednesday 12/18/24 and they need it before his surgery date. Thanks!    Date of last appointment with provider: 12/03/2024          F#: [phone]

## 2024-12-17 NOTE — TELEPHONE ENCOUNTER
[facility] is calling back to check on the result of this lab. I spoke to lab and it is not ready. [place] and it usually takes about 5 days to get the result. I informed [facility] and tell them to call back later early this afternoon as there is a possibility from lab.

## 2024-12-17 NOTE — TELEPHONE ENCOUNTER
Lab called to check on the status of this and it will not be back till the 19th. If [facility] call, please inform them the date and pt will have to reschedule surgery. There is nothing we can do here as it normally does take 5 business days to get the results.

## 2024-12-26 ENCOUNTER — TELEPHONE (OUTPATIENT)
Dept: FAMILY MEDICINE | Facility: CLINIC | Age: 44
End: 2024-12-26
Payer: COMMERCIAL

## 2024-12-26 NOTE — TELEPHONE ENCOUNTER
You can ask Dr. Payne this question when she returns, but my guess would be that she would ask him to return to [place]

## 2024-12-30 ENCOUNTER — DOCUMENTATION ONLY (OUTPATIENT)
Dept: SLEEP MEDICINE | Facility: CLINIC | Age: 44
End: 2024-12-30
Payer: COMMERCIAL

## 2024-12-30 DIAGNOSIS — G47.33 OBSTRUCTIVE SLEEP APNEA (ADULT) (PEDIATRIC): Primary | ICD-10-CM

## 2024-12-30 NOTE — PROGRESS NOTES
Patient was offered choice of vendor and chose [facility].  Patient [facility] was set up at [place] on December 30, 2024. Patient received a Resmed Airsense 11 Pressures were set at  5-15 cm H2O.   Patient s ramp is AUTO cm H2O for Auto and FLEX/EPR is 2.  Patient received a Resmed Mask name: AIRFIT F20  Full Face mask size Large, heated tubing and heated humidifier.  Patient has the following compliance requirements: usage only  Nadeen Mendiola

## 2024-12-31 NOTE — TELEPHONE ENCOUNTER
Dr. Payne,    Please review.    Real HODGES RN  [facility]

## 2024-12-31 NOTE — TELEPHONE ENCOUNTER
I would not be comfortable removing drainage.  I recommend following up with his provider in [place].

## 2025-01-02 ENCOUNTER — DOCUMENTATION ONLY (OUTPATIENT)
Dept: SLEEP MEDICINE | Facility: CLINIC | Age: 45
End: 2025-01-02
Payer: COMMERCIAL

## 2025-01-02 NOTE — PROGRESS NOTES
3 day Sleep therapy management telephone visit    Diagnostic AHI:   PS.1         LEFT VOICE MESSAGE FOR PATIENT TO RETURN CALL      Order settings:  CPAP MIN CPAP MAX   5 cm H2O 15 cm H2O         Device settings:  CPAP MIN CPAP MAX EPR RESMED SOFT RESPONSE SETTING   5.0 cm  H20 15.0 cm  H20 TWO OFF         Patient has the following upcoming sleep appts:      Replacement device: No  STM ordered by provider: Yes     Total time spent on accessing and  interpreting remote patient PAP therapy data  10 minutes    Total time spent counseling, coaching  and reviewing PAP therapy data with patient  0 minutes

## 2025-01-07 NOTE — TELEPHONE ENCOUNTER
See ED and gen surgery follow ups. Most appropriate for pt to be seen by [facility] surgical team. Surgical follow up tomorrow 1/8.    GEORGE RoyalN, PHN, AMB-BC (she/her)  [facility] RN

## 2025-01-14 ENCOUNTER — DOCUMENTATION ONLY (OUTPATIENT)
Dept: SLEEP MEDICINE | Facility: CLINIC | Age: 45
End: 2025-01-14
Payer: COMMERCIAL

## 2025-01-14 NOTE — PROGRESS NOTES
14  DAY STM VISIT    Diagnostic AHI: PS.1         Unable to leave message.     Assessment: Pt not meeting objective benchmarks for compliance       Action plan: waiting for patient to return call.  and pt to have 30 day STM visit.      Device type: Auto-CPAP    PAP settings: CPAP min 5.0 cm  H20       CPAP max 15.0 cm  H20      95th% pressure 8 cm  H20        RESMED EPR level Setting: TWO    RESMED Soft response setting:  OFF    Mask type:      Objective measures: 14 day rolling measures      Compliance  35 %      Leak  82.4  lpm  last  upload      AHI 2.26   last  upload      Average number of minutes 163      Objective measure goal  Compliance   Goal >70%  Leak   Goal < 24 lpm  AHI  Goal < 5  Usage  Goal >240    Patient has the following upcoming sleep appts:      Total time spent on accessing and interpreting remote patient PAP therapy data  10 minutes    Total time spent counseling, coaching  and reviewing PAP therapy data with patient  1 minutes    [id number]  [id number]  no (3 day STM)

## 2025-01-29 ENCOUNTER — TELEPHONE (OUTPATIENT)
Dept: FAMILY MEDICINE | Facility: CLINIC | Age: 45
End: 2025-01-29
Payer: COMMERCIAL

## 2025-01-29 NOTE — TELEPHONE ENCOUNTER
Pt called states is is s/p S/p ventral hernia repair 12/18/24. Has been having Nausea and fatigue off and on for the last 2-3 wks. Also had diarrhea for 3-4 days but this is clearing up. Pt requested appt with clinician with concerns that symptoms could be associated with hernia repair.     Appt scheduled for Friday, 1/31/25.    Elin MAGDALENO RN  [facility]

## 2025-01-31 PROBLEM — M54.50 CHRONIC BILATERAL LOW BACK PAIN WITHOUT SCIATICA: Status: ACTIVE | Noted: 2024-02-12

## 2025-01-31 PROBLEM — G89.29 CHRONIC BILATERAL LOW BACK PAIN WITHOUT SCIATICA: Status: ACTIVE | Noted: 2024-02-12

## 2025-01-31 PROBLEM — K43.2 RECURRENT VENTRAL INCISIONAL HERNIA: Status: ACTIVE | Noted: 2023-06-09

## 2025-02-03 ENCOUNTER — LAB (OUTPATIENT)
Dept: LAB | Facility: CLINIC | Age: 45
End: 2025-02-03
Payer: COMMERCIAL

## 2025-02-03 DIAGNOSIS — R19.7 DIARRHEA, UNSPECIFIED TYPE: ICD-10-CM

## 2025-02-03 LAB

## 2025-02-03 PROCEDURE — 87493 C DIFF AMPLIFIED PROBE: CPT

## 2025-02-03 PROCEDURE — 87507 IADNA-DNA/RNA PROBE TQ 12-25: CPT | Mod: 59

## 2025-02-04 ENCOUNTER — TELEPHONE (OUTPATIENT)
Dept: FAMILY MEDICINE | Facility: CLINIC | Age: 45
End: 2025-02-04
Payer: COMMERCIAL

## 2025-02-04 NOTE — TELEPHONE ENCOUNTER
----- Message from Cornelia Payne sent at 2/3/2025  6:15 PM CST -----  Anemia has improved.     Electrolytes, kidney function were all normal.

## 2025-02-05 ENCOUNTER — TELEPHONE (OUTPATIENT)
Dept: FAMILY MEDICINE | Facility: CLINIC | Age: 45
End: 2025-02-05
Payer: COMMERCIAL

## 2025-02-06 ENCOUNTER — DOCUMENTATION ONLY (OUTPATIENT)
Dept: SLEEP MEDICINE | Facility: CLINIC | Age: 45
End: 2025-02-06
Payer: COMMERCIAL

## 2025-02-06 NOTE — PROGRESS NOTES
30 day Sleep therapy management telephone visit    Diagnostic AHI:   PS.1         LEFT VOICE MESSAGE FOR PATIENT TO RETURN CALL      Objective measures: 14 day rolling measures   COMPLIANCE LEAK AHI AVERAGE USE IN MINUTES   57 % 54.7 2.4 259   GOAL >70% GOAL < 24 LPM GOAL <5 GOAL >240          Device settings:  CPAP MIN CPAP MAX EPR RESMED SOFT RESPONSE SETTING   5.0 cm  H20 15.0 cm  H20 THREE OFF         Patient has the following upcoming sleep appts:      Replacement device: No  STM ordered by provider: Yes     Total time spent on accessing and  interpreting remote patient PAP therapy data  10 minutes    Total time spent counseling, coaching  and reviewing PAP therapy data with patient  0 minutes

## 2025-05-30 ENCOUNTER — TELEPHONE (OUTPATIENT)
Dept: FAMILY MEDICINE | Facility: CLINIC | Age: 45
End: 2025-05-30
Payer: COMMERCIAL

## 2025-05-30 NOTE — TELEPHONE ENCOUNTER
Medication Question or Refill    Patient called clinic requesting to restart on Wegovy for weight management. He stated that he went off of it due to having surgery back in December, but he would like to restart on it again.     He's also wondering if PCP is ok to have him restart weight management medication, does PCP think having him on Zepbound would be better than going back on wegovy?     Will route to PCP to review and advise.         Tommy Zamudio, MSN, RN   [facility]

## 2025-06-02 ENCOUNTER — VIRTUAL VISIT (OUTPATIENT)
Dept: FAMILY MEDICINE | Facility: CLINIC | Age: 45
End: 2025-06-02
Payer: COMMERCIAL

## 2025-06-02 DIAGNOSIS — Z98.890 S/P REPAIR OF VENTRAL HERNIA: ICD-10-CM

## 2025-06-02 DIAGNOSIS — E66.813 CLASS 3 SEVERE OBESITY WITHOUT SERIOUS COMORBIDITY WITH BODY MASS INDEX (BMI) OF 40.0 TO 44.9 IN ADULT, UNSPECIFIED OBESITY TYPE (H): Primary | ICD-10-CM

## 2025-06-02 DIAGNOSIS — Z87.19 S/P REPAIR OF VENTRAL HERNIA: ICD-10-CM

## 2025-06-02 PROCEDURE — 98006 SYNCH AUDIO-VIDEO EST MOD 30: CPT | Performed by: STUDENT IN AN ORGANIZED HEALTH CARE EDUCATION/TRAINING PROGRAM

## 2025-06-02 ASSESSMENT — ASTHMA QUESTIONNAIRES
QUESTION_1 LAST FOUR WEEKS HOW MUCH OF THE TIME DID YOUR ASTHMA KEEP YOU FROM GETTING AS MUCH DONE AT WORK, SCHOOL OR AT HOME: NONE OF THE TIME
QUESTION_4 LAST FOUR WEEKS HOW OFTEN HAVE YOU USED YOUR RESCUE INHALER OR NEBULIZER MEDICATION (SUCH AS ALBUTEROL): ONCE A WEEK OR LESS
QUESTION_3 LAST FOUR WEEKS HOW OFTEN DID YOUR ASTHMA SYMPTOMS (WHEEZING, COUGHING, SHORTNESS OF BREATH, CHEST TIGHTNESS OR PAIN) WAKE YOU UP AT NIGHT OR EARLIER THAN USUAL IN THE MORNING: NOT AT ALL
QUESTION_2 LAST FOUR WEEKS HOW OFTEN HAVE YOU HAD SHORTNESS OF BREATH: ONCE OR TWICE A WEEK
QUESTION_5 LAST FOUR WEEKS HOW WOULD YOU RATE YOUR ASTHMA CONTROL: WELL CONTROLLED
ACT_TOTALSCORE: 22

## 2025-06-02 NOTE — PROGRESS NOTES
Yo is a 45 year old who is being evaluated via a billable video visit.    What phone number would you like to be contacted at? [phone]  How would you like to obtain your AVS? Mail a copy      Yo was seen today for office visit.    Diagnoses and all orders for this visit:    Class 3 severe obesity without serious comorbidity with body mass index (BMI) of 40.0 to 44.9 in adult, unspecified obesity type (H)  Previously on Wegovy, stopped 2/2 constipation and concern for worsening ventral hernia.  Now s/p repair of ventral hernia.  Reports that he is doing well, denies nausea, vomiting, constipation.  Already walking 10,000 steps per day, trying to change his diet, although struggles.  Would like to restart weight loss medication.  Would like to try Zepbound.  Did not have any weight loss with Wegovy last time.    -     tirzepatide-Weight Management (ZEPBOUND) 2.5 MG/0.5ML prefilled pen; Inject 0.5 mLs (2.5 mg) subcutaneously every 7 days.  - Declined referral back to weight management clinic, reports that he is busy and has a hard time coordinating multiple of his  - Discussed lifestyle changes, diet recommendations provided in AVS, recommend 150 minutes of physical activity per week  - RTC 4-week    S/P repair of ventral hernia  Doing well, denies nausea, vomiting.        Subjective   Yo is a 45 year old, presenting for the following health issues:  office visit (Discuss weight loss med)        6/2/2025     5:20 PM   Additional Questions   Roomed by    Accompanied by self     Video Start Time: 5:30pm    History of Present Illness       Reason for visit:  Discuss tweight loss med He is missing 1 dose(s) of medications per week.  He is not taking prescribed medications regularly due to remembering to take.          Wt: 280  Ht: 5/9    Objective    Vitals - Patient Reported  Systolic (Patient Reported):  (unable)  Diastolic (Patient Reported):  (unable)  Weight (Patient Reported): 127 kg (280  "lb)  Height (Patient Reported): 175.3 cm (5' 9\")  BMI (Based on Pt Reported Ht/Wt): 41.35        Physical Exam   GENERAL: alert and no distress  EYES: Eyes grossly normal to inspection.  No discharge or erythema, or obvious scleral/conjunctival abnormalities.  RESP: No audible wheeze, cough, or visible cyanosis.    SKIN: Visible skin clear. No significant rash, abnormal pigmentation or lesions.  NEURO: Cranial nerves grossly intact.  Mentation and speech appropriate for age.  PSYCH: Appropriate affect, tone, and pace of words          Video-Visit Details    Type of service:  Video Visit   Video End Time: 5:50 PM  Originating Location (pt. Location): Home    Distant Location (provider location):  On-site  Platform used for Video Visit: Lei  Signed Electronically by: Cornelia Payne MD    "

## 2025-06-02 NOTE — PATIENT INSTRUCTIONS
"    Following the MyPlate Food Guide: Care Instructions  MyPlate is a food guide from the U.[place]. Department of Agriculture. You can use it to know how much fruit, vegetables, grains, milk products, and protein foods to eat every day. These amounts are for adults who need 1,600 to 3,200 calories a day.    Eat 1  to 2  cups of fresh, frozen, dried, juiced, or canned fruit.   These count as 1 cup of fruit:  1 cup of fresh fruit, frozen fruit, or 100% fruit juice  1/2 cup of dried fruit  1 large banana, large orange, or small apple     Eat 2 to 4 cups of vegetables. Try to include dark green, red, and orange vegetables.   These count as 1 cup of vegetables:  2 cups of raw leafy greens  1 cup of raw or cooked vegetables  1 cup of vegetable juice     Eat 5 to 10 oz (ounces) of grains, such as bread, cereal, rice, tortillas, or noodles. Choose whole-grain products for at least half of your grain servings.   These count as 1 oz of grains:  1 slice of bread  1 tortilla (6-inch)  1/2 cup cooked rice or noodles     Eat or drink 3 cups of milk products, such as nonfat or low-fat milk, soy milk, cheese, or yogurt.   These count as 1 cup of milk products:  1 cup of milk, soy milk, or yogurt  1/3 cup of shredded cheese  1  oz of hard cheese, like cheddar     Eat 5 to 7 oz of protein foods. These include lean meats, poultry, fish, soy products, eggs, nuts, seeds, beans, and lentils.   These count as 1 oz of protein foods:  1 oz of meat, poultry, or fish  1 egg  1/4 cup of cooked beans or tofu   Where can you learn more?  Go to https://www.healthShowUhow.net/patiented  Enter Z059 in the search box to learn more about \"Following the MyPlate Food Guide: Care Instructions.\"  Current as of: October 7, 2024  Content Version: 14.4    3630-5497 [facility] seedtag.   Care instructions adapted under license by your healthcare professional. If you have questions about a medical condition or this instruction, always ask your healthcare " "professional. [email or web address] disclaims any warranty or liability for your use of this information.    Learning About Being Physically Active  What is physical activity?     Being physically active means doing any kind of activity that gets your body moving.  The types of physical activity that can help you get fit and stay healthy include:  Aerobic or \"cardio\" activities. These make your heart beat faster and make you breathe harder, such as brisk walking, riding a bike, or running. They strengthen your heart and lungs and build up your endurance.  Strength training activities. These make your muscles work against, or \"resist,\" something. Examples include lifting weights or doing push-ups. These activities help tone and strengthen your muscles and bones.  Stretches. These let you move your joints and muscles through their full range of motion. Stretching helps you be more flexible.  Reaching a balance between these three types of physical activity is important because each one contributes to your overall fitness.  What are the benefits of being active?  Being active is one of the best things you can do for your health. It helps you to:  Feel stronger and have more energy to do all the things you like to do.  Focus better at school or work.  Feel, think, and sleep better.  Reach and stay at a healthy weight.  Lose fat and build lean muscle.  Lower your risk for serious health problems, including diabetes, heart attack, high blood pressure, and some cancers.  Keep your heart, lungs, bones, muscles, and joints strong and healthy.  How can you make being active part of your life?  Start slowly. Make it your long-term goal to get at least 30 minutes of exercise on most days of the week. Walking is a good choice. You also may want to do other activities, such as running, swimming, cycling, or playing tennis or team sports.  Pick activities that you like--ones that make your heart beat faster, your muscles " "stronger, and your muscles and joints more flexible. If you find more than one thing you like doing, do them all. You don't have to do the same thing every day.  Get your heart pumping every day. Any activity that makes your heart beat faster and keeps it at that rate for a while counts.  Here are some great ways to get your heart beating faster:  Go for a brisk walk, run, or hike.  Go for a swim or bike ride.  Take an online exercise class or dance.  Play a game of touch football, basketball, or soccer.  Play tennis, pickleball, or racquetball.  Climb stairs.  Even some household chores can be aerobic. Just do them at a faster pace. Raking or mowing the lawn, sweeping the garage, and vacuuming and cleaning your home all can help get your heart rate up.  Strengthen your muscles during the week. You don't have to lift heavy weights or grow big, bulky muscles to get stronger. Doing a few simple activities that make your muscles work against, or \"resist,\" something can help you get stronger. Aim for at least twice a week.  For example, you can:  Do push-ups or sit-ups, which use your own body weight as resistance.  Lift weights or dumbbells or use stretch bands at home or in a gym or community center.  Stretch your muscles often. Stretching will help you as you become more active. It can help you stay flexible and loosen tight muscles. It can also help improve your balance and posture and can be a great way to relax.  Be sure to stretch the muscles you'll be using when you work out. It's best to warm your muscles slightly before you stretch them. Walk or do some other light aerobic activity for a few minutes. Then start stretching.  When you stretch your muscles:  Do it slowly. Stretching is not about going fast or making sudden movements.  Don't push or bounce during a stretch.  Hold each stretch for at least 15 to 30 seconds, if you can. You should feel a stretch in the muscle, but not pain.  Breathe out as you do " "the stretch. Then breathe in as you hold the stretch. Don't hold your breath.  If you're worried about how more activity might affect your health, have a checkup before you start. Follow any special advice your doctor gives you for getting a smart start.  Where can you learn more?  Go to https://www.Lifetable.net/patiented  Enter W332 in the search box to learn more about \"Learning About Being Physically Active.\"  Current as of: July 31, 2024  Content Version: 14.4    1139-0915 [employer].   Care instructions adapted under license by your healthcare professional. If you have questions about a medical condition or this instruction, always ask your healthcare professional. [employer] disclaims any warranty or liability for your use of this information.    Walking for Exercise: Care Instructions  Overview     Walking is one of the easiest ways to get the exercise you need for good health. A brisk, 30-minute walk each day can help you feel better and have more energy. It can help you lower your risk of disease. Walking can help you keep your bones strong and your heart healthy.  Check with your doctor before you start a walking plan if you have heart problems, other health issues, or you have not been active in a long time. Follow your doctor's instructions for safe levels of exercise.  Follow-up care is a key part of your treatment and safety. Be sure to make and go to all appointments, and call your doctor if you are having problems. It's also a good idea to know your test results and keep a list of the medicines you take.  How can you care for yourself at home?  Getting started  Start slowly and set a short-term goal. For example, walk for 5 or 10 minutes every day.  Bit by bit, increase the amount you walk every day. Try for at least 30 minutes on most days of the week. You also may want to swim, bike, or do other activities.  If finding enough time is a problem, it's fine to be active in " "shorter periods of time throughout your day.  To get the heart-healthy benefits of walking, you need to walk briskly enough to increase your heart rate and breathing, but not so fast that you can't talk comfortably.  Wear comfortable shoes that fit well and provide good support for your feet and ankles.  Staying with your plan  After you've made walking a habit, set a longer-term goal. You may want to set a goal of walking briskly for longer or walking farther. Experts say to do 2  hours (150 minutes) of moderate activity a week. A faster heartbeat is what defines moderate-level activity.  To stay motivated, walk with friends, coworkers, or pets.  Use a phone melissa, pedometer, or wearable device to track your steps each day. Set a goal to increase your steps. When you reach that goal, set a higher goal.  If the weather keeps you from walking outside, go for walks at the mall with a friend. Local schools and churches may have indoor gyms where you can walk.  Fitting a walk into your workday  Park several blocks away from work, or get off the bus a few stops early.  Use the stairs instead of the elevator, at least for a few floors.  Suggest holding meetings with colleagues during a walk inside or outside the building.  Use the restroom that is the farthest from your desk or workstation.  Use your morning and afternoon breaks to take quick 15 minutes walks.  Staying safe  Know your surroundings. Walk in a well-lighted, safe place. If it's dark, walk with a partner. Wear light-colored clothing. If you can, buy a vest or jacket that reflects light.  Carry a cell phone for emergencies.  Drink plenty of water. Take a water bottle with you when you walk. This is very important if it is hot out.  Be careful not to slip on wet or icy ground. You can buy \"grippers\" for your shoes to help keep you from slipping.  Pay attention to your walking surface. Use sidewalks and paths.  If you have health issues such as asthma, COPD, or " "heart problems, or if you haven't been active for a long time, check with your doctor before you start a new activity.  Where can you learn more?  Go to https://www.[employer].net/patiented  Enter R159 in the search box to learn more about \"Walking for Exercise: Care Instructions.\"  Current as of: July 31, 2024  Content Version: 14.4    7316-0043 [employer].   Care instructions adapted under license by your healthcare professional. If you have questions about a medical condition or this instruction, always ask your healthcare professional. [employer] disclaims any warranty or liability for your use of this information.    "

## 2025-06-02 NOTE — PROGRESS NOTES
***  Nausea  Diarrhea, unspecified type  Other fatigue  Anemia, unspecified type  s/p recurrent ventral hernia repair 12/18/2024.  Has been having nausea, diarrhea and fatigue for the last couple of weeks.  Seems to be improving, however stools are still loose. Patient was noted to have anemia likely 2/2 bleeding 2/2 surgery.  Last hemoglobin 8.8 on 12/23/2024.  Recheck CBC, will also check BMP and stool tests.  Zofran for nausea.  -     ondansetron (ZOFRAN ODT) 4 MG ODT tab; Take 1 tablet (4 mg) by mouth every 8 hours as needed for nausea.  -     Enteric Bacteria and Virus Panel by [facility]UCKIE Stool; Future  -     C. difficile Toxin B PCR with reflex to C. difficile Antigen and Toxins A/B EIA; Future -all negative***  -     CBC with platelets; Future -improved***  -     Basic metabolic panel  (Ca, Cl, CO2, Creat, Gluc, K, Na, BUN); Future -WNL***     Recurrent ventral incisional hernia  s/p recurrent ventral hernia repair 12/18/2024.  Seen 1/15/2025.  Return to work with restrictions provided.  RTC as needed  Per TE 1/20/2025: Recommended use of methocarbamol, warm packs for muscle spasm  Per TE 1/20/2025: Can increase weight lifting to 30 pounds.  2/3/2025, no weight lifting restriction needed.  Follow-up with PCP regarding nausea and diarrhea.  -     REVIEW OF HEALTH MAINTENANCE PROTOCOL ORDERS     Chronic bilateral low back pain without sciatica  Chronic, stable.  Refill medication.  -     cyclobenzaprine (FLEXERIL) 5 MG tablet; Take 1 tablet (5 mg) by mouth 3 times daily as needed for muscle spasms.     Moderate episode of recurrent major depressive disorder (H)  Chronic, stable, refill meds.  -     OLANZapine (ZYPREXA) 10 MG tablet; Take 1 tablet (10 mg) by mouth at bedtime.  -     buPROPion (WELLBUTRIN XL) 300 MG 24 hr tablet; Take 1 tablet (300 mg) by mouth every morning.     Gastroesophageal reflux disease with esophagitis, unspecified whether hemorrhage  Chronic, stable.  Refill medication.  -     omeprazole  (PRILOSEC) 20 MG DR capsule; Take 1 capsule (20 mg) by mouth daily.     Constipation, unspecified constipation type  Chronic, stable.  Refill medication.  -     polyethylene glycol (MIRALAX) 17 GM/Dose powder; Take 17 g (1 Capful) by mouth daily.     SOB (shortness of breath)  Chronic, stable.  Refill medication.  -     VENTOLIN  (90 Base) MCG/ACT inhaler; Inhale 2 puffs into the lungs every 4 hours as needed for shortness of breath, wheezing or cough.     Screen for colon cancer  Comments:  Deferred 2/2 recent ventral hernia surgery   ***

## 2025-06-03 ENCOUNTER — TELEPHONE (OUTPATIENT)
Dept: FAMILY MEDICINE | Facility: CLINIC | Age: 45
End: 2025-06-03
Payer: COMMERCIAL

## 2025-06-03 NOTE — TELEPHONE ENCOUNTER
Please start prior authorization for     tirzepatide-Weight Management (ZEPBOUND) 2.5 MG/0.5ML prefilled pen      Thanks

## 2025-06-03 NOTE — TELEPHONE ENCOUNTER
Future Appointments 6/3/2025 - 11/30/2025        Date Visit Type Length Department Provider     7/3/2025  5:00 PM OFFICE VISIT 30 min [facility] Cornelia Payne MD    Location Instructions:     [facility] is located at [address] [place] [address] in [place],at the intersection of [address].This is one block south of the [facility].Free parking is available in the lot directly north of the clinic across [address].The clinic is near stops along bus [address] and [address].  Due to ongoing road construction in the area around the [facility], travel times to this location may be longer than usual.Please plan for extra travel time and check the [place] Department of Transportation website for delay, closure,and detour information on the various road construction projects in the area.

## 2025-06-04 NOTE — TELEPHONE ENCOUNTER
Central Prior Authorization Team   Phone: [phone]    PA Initiation    Medication: tirzepatide-Weight Management (ZEPBOUND) 2.5 MG/0.5ML prefilled pen  Insurance Company: [employer] [facility] - Phone [phone] Fax [phone]  Pharmacy Filling the Rx: [id number] - [place], [place] - [address] [place] [address] AT [facility] OF [address]  Filling Pharmacy Phone: [phone]  Filling Pharmacy Fax:    Start Date: 6/4/2025    [facility] KEY: [id number]

## 2025-06-04 NOTE — TELEPHONE ENCOUNTER
Prior Authorization Approval    Authorization Effective Date: 4/1/2025  Authorization Expiration Date: 12/4/2025  Medication: tirzepatide-Weight Management (ZEPBOUND) 2.5 MG/0.5ML prefilled pen  Reference #:     Insurance Company: [employer] - Phone [phone] Fax [phone]  Which Pharmacy is filling the prescription (Not needed for infusion/clinic administered): [id number] - [place], [place] - [address] [facility] [address]  Pharmacy Notified: Yes  Patient Notified: Instructed pharmacy to notify patient when script is ready to /ship.

## 2025-06-12 DIAGNOSIS — K21.00 GASTROESOPHAGEAL REFLUX DISEASE WITH ESOPHAGITIS, UNSPECIFIED WHETHER HEMORRHAGE: ICD-10-CM

## 2025-06-12 RX ORDER — OMEPRAZOLE 20 MG/1
20 CAPSULE, DELAYED RELEASE ORAL DAILY
Qty: 30 CAPSULE | Refills: 2 | Status: SHIPPED | OUTPATIENT
Start: 2025-06-12

## 2025-07-02 ENCOUNTER — TELEPHONE (OUTPATIENT)
Dept: FAMILY MEDICINE | Facility: CLINIC | Age: 45
End: 2025-07-02
Payer: COMMERCIAL

## 2025-07-02 NOTE — TELEPHONE ENCOUNTER
Contacts       Contact Date/Time Type Contact Phone/Fax    2025 02:44 PM CDT Phone (Outgoing) Yo East (Self) [phone] (M)    Left Message           Attempted to reach patient to: Relay a message    Regardin/3 Appointment    Action to take: OK to relay (verbatim): please remind patient of appontment on  at 4:10 and to allow extra time for travel due to construction in the area.

## 2025-07-03 ENCOUNTER — OFFICE VISIT (OUTPATIENT)
Dept: FAMILY MEDICINE | Facility: CLINIC | Age: 45
End: 2025-07-03
Payer: COMMERCIAL

## 2025-07-03 ENCOUNTER — ORDERS ONLY (AUTO-RELEASED) (OUTPATIENT)
Dept: FAMILY MEDICINE | Facility: CLINIC | Age: 45
End: 2025-07-03

## 2025-07-03 VITALS
BODY MASS INDEX: 41.45 KG/M2 | RESPIRATION RATE: 20 BRPM | SYSTOLIC BLOOD PRESSURE: 146 MMHG | HEIGHT: 68 IN | HEART RATE: 86 BPM | DIASTOLIC BLOOD PRESSURE: 79 MMHG | OXYGEN SATURATION: 95 % | WEIGHT: 273.5 LBS | TEMPERATURE: 97.4 F

## 2025-07-03 DIAGNOSIS — E66.813 CLASS 3 SEVERE OBESITY WITHOUT SERIOUS COMORBIDITY WITH BODY MASS INDEX (BMI) OF 40.0 TO 44.9 IN ADULT, UNSPECIFIED OBESITY TYPE (H): ICD-10-CM

## 2025-07-03 DIAGNOSIS — Z11.3 SCREEN FOR STD (SEXUALLY TRANSMITTED DISEASE): ICD-10-CM

## 2025-07-03 DIAGNOSIS — Z59.819 HOUSING INSECURITY: ICD-10-CM

## 2025-07-03 DIAGNOSIS — F15.91 HISTORY OF METHAMPHETAMINE USE: ICD-10-CM

## 2025-07-03 DIAGNOSIS — N50.89 TESTICULAR MASS: ICD-10-CM

## 2025-07-03 DIAGNOSIS — Z12.11 SCREEN FOR COLON CANCER: ICD-10-CM

## 2025-07-03 DIAGNOSIS — D64.9 ANEMIA, UNSPECIFIED TYPE: ICD-10-CM

## 2025-07-03 DIAGNOSIS — Z23 NEED FOR VACCINATION: ICD-10-CM

## 2025-07-03 DIAGNOSIS — E78.1 HYPERTRIGLYCERIDEMIA: ICD-10-CM

## 2025-07-03 DIAGNOSIS — Z13.1 SCREENING FOR DIABETES MELLITUS: ICD-10-CM

## 2025-07-03 DIAGNOSIS — Z00.00 ROUTINE GENERAL MEDICAL EXAMINATION AT A HEALTH CARE FACILITY: Primary | ICD-10-CM

## 2025-07-03 DIAGNOSIS — Z13.9 ENCOUNTER FOR SCREENING INVOLVING SOCIAL DETERMINANTS OF HEALTH (SDOH): ICD-10-CM

## 2025-07-03 DIAGNOSIS — Z59.41 FOOD INSECURITY: ICD-10-CM

## 2025-07-03 DIAGNOSIS — Z13.6 SCREENING FOR CARDIOVASCULAR CONDITION: ICD-10-CM

## 2025-07-03 LAB
ALBUMIN SERPL BCG-MCNC: 4.6 G/DL (ref 3.5–5.2)
ALP SERPL-CCNC: 105 U/L (ref 40–150)
ALT SERPL W P-5'-P-CCNC: NORMAL U/L
ANION GAP SERPL CALCULATED.3IONS-SCNC: 13 MMOL/L (ref 7–15)
AST SERPL W P-5'-P-CCNC: 44 U/L (ref 0–45)
BILIRUB SERPL-MCNC: 0.5 MG/DL
BUN SERPL-MCNC: 16.4 MG/DL (ref 6–20)
CALCIUM SERPL-MCNC: 9.9 MG/DL (ref 8.8–10.4)
CHLORIDE SERPL-SCNC: 102 MMOL/L (ref 98–107)
CHOLEST SERPL-MCNC: 201 MG/DL
CREAT SERPL-MCNC: 0.94 MG/DL (ref 0.67–1.17)
EGFRCR SERPLBLD CKD-EPI 2021: >90 ML/MIN/1.73M2
ERYTHROCYTE [DISTWIDTH] IN BLOOD BY AUTOMATED COUNT: 13.5 % (ref 10–15)
EST. AVERAGE GLUCOSE BLD GHB EST-MCNC: 114 MG/DL
FASTING STATUS PATIENT QL REPORTED: NO
FASTING STATUS PATIENT QL REPORTED: NO
GLUCOSE SERPL-MCNC: 91 MG/DL (ref 70–99)
HBA1C MFR BLD: 5.6 % (ref 0–5.6)
HCO3 SERPL-SCNC: 25 MMOL/L (ref 22–29)
HCT VFR BLD AUTO: 45.3 % (ref 40–53)
HCV AB SERPL QL IA: NONREACTIVE
HDLC SERPL-MCNC: 39 MG/DL
HGB BLD-MCNC: 14.8 G/DL (ref 13.3–17.7)
HIV 1+2 AB+HIV1 P24 AG SERPL QL IA: NONREACTIVE
LDLC SERPL CALC-MCNC: 132 MG/DL
MCH RBC QN AUTO: 26.4 PG (ref 26.5–33)
MCHC RBC AUTO-ENTMCNC: 32.7 G/DL (ref 31.5–36.5)
MCV RBC AUTO: 81 FL (ref 78–100)
NONHDLC SERPL-MCNC: 162 MG/DL
PLATELET # BLD AUTO: 248 10E3/UL (ref 150–450)
POTASSIUM SERPL-SCNC: 5 MMOL/L (ref 3.4–5.3)
PROT SERPL-MCNC: 7.9 G/DL (ref 6.4–8.3)
RBC # BLD AUTO: 5.61 10E6/UL (ref 4.4–5.9)
SODIUM SERPL-SCNC: 140 MMOL/L (ref 135–145)
TRIGL SERPL-MCNC: 151 MG/DL
WBC # BLD AUTO: 8.6 10E3/UL (ref 4–11)

## 2025-07-03 RX ORDER — DOCUSATE SODIUM 100 MG/1
100 CAPSULE, LIQUID FILLED ORAL
COMMUNITY
Start: 2024-12-22

## 2025-07-03 RX ORDER — METHOCARBAMOL 750 MG/1
TABLET, FILM COATED ORAL
COMMUNITY

## 2025-07-03 RX ORDER — MULTIVITAMIN
1 TABLET ORAL EVERY MORNING
COMMUNITY

## 2025-07-03 RX ORDER — SELENIUM 50 MCG
1 TABLET ORAL EVERY MORNING
COMMUNITY

## 2025-07-03 SDOH — ECONOMIC STABILITY - HOUSING INSECURITY: HOUSING INSTABILITY UNSPECIFIED: Z59.819

## 2025-07-03 SDOH — HEALTH STABILITY: PHYSICAL HEALTH: ON AVERAGE, HOW MANY DAYS PER WEEK DO YOU ENGAGE IN MODERATE TO STRENUOUS EXERCISE (LIKE A BRISK WALK)?: 3 DAYS

## 2025-07-03 SDOH — ECONOMIC STABILITY - FOOD INSECURITY: FOOD INSECURITY: Z59.41

## 2025-07-03 ASSESSMENT — SOCIAL DETERMINANTS OF HEALTH (SDOH): HOW OFTEN DO YOU GET TOGETHER WITH FRIENDS OR RELATIVES?: ONCE A WEEK

## 2025-07-03 NOTE — PROGRESS NOTES
Preventive Care Visit  [facility]  Cornelia Payne MD, Family Medicine  Jul 3, 2025      Yo was seen today for physical.    Diagnoses and all orders for this visit:    Routine general medical examination at a health care facility  -     PRIMARY CARE FOLLOW-UP SCHEDULING; Future  -     Comprehensive metabolic panel (BMP + Alb, Alk Phos, ALT, AST, Total. Bili, TP); Future  -     Comprehensive metabolic panel (BMP + Alb, Alk Phos, ALT, AST, Total. Bili, TP)    Screen for colon cancer  Comments:  Discussed options, would like to do Cologuard.  Ordered.  Orders:  -     COLOGUARD([employer] SCI[employer]ES); Future    Screening for cardiovascular condition  Hypertriglyceridemia  Comments:  Chronic, recheck today.  Not on medication for  Orders:  -     Lipid panel reflex to direct LDL Non-fasting; Future  -     Lipid panel reflex to direct LDL Non-fasting    Class 3 severe obesity without serious comorbidity with body mass index (BMI) of 40.0 to 44.9 in adult, unspecified obesity type (H)  Comments:  Recently started Zepbound, doing well.  Losing weight.  Denies side effects, will increase.  Discussed lifestyle changes.  RTC 1 month  Orders:  -     tirzepatide-Weight Management (ZEPBOUND) 5 MG/0.5ML prefilled pen; Inject 0.5 mLs (5 mg) subcutaneously every 7 days.  -     Comprehensive metabolic panel (BMP + Alb, Alk Phos, ALT, AST, Total. Bili, TP); Future  -     Comprehensive metabolic panel (BMP + Alb, Alk Phos, ALT, AST, Total. Bili, TP)  - Lifestyle modifications, including: Increasing intake of vegetables and fruits, decreasing intake of processed foods/carbohydrates/sugars, having regular physical activity, and losing weight.    Screen for STD (sexually transmitted disease)  -     HIV Antigen Antibody Combo Cascade; Future  -     Treponema Abs w Reflex to RPR and Titer; Future  -     Chlamydia trachomatis/Neisseria gonorrhoeae by PCR  -     Hepatitis C antibody; Future  -     HIV Antigen Antibody Combo  Cascade  -     Treponema Abs w Reflex to RPR and Titer  -     Hepatitis C antibody    Screening for diabetes mellitus  -     Hemoglobin A1c; Future  -     Hemoglobin A1c    Need for vaccination  -     HEPATITIS B, ADULT 20+ (ENGERIX-B/RECOMBIVAX HB)    History of methamphetamine use  Comments:  Last used 18 months. In Sober house.    Food insecurity  Comments:  Declined referral to food navigator.    Encounter for screening involving social determinants of health (SDoH)  Housing insecurity  Comments:  Refer to CC.  -     Primary Care - Care Coordination Referral; Future    Anemia, unspecified type  Comments:  Noted per chart review, likely 2/2 surgery.  Recheck today.  Orders:  -     CBC with platelets; Future  -     CBC with platelets    Testicular mass  Comments:  Round, mobile, soft approximately 0.5 cm x 0.5 cm mass in the right epididymis.  Non TTP. Order US to further evaluate.  STD testing as noted above  Orders:  -     US Testicular & Scrotum w Doppler Ltd; Future      The longitudinal plan of care for the diagnosis(es)/condition(s) as documented were addressed during this visit. Due to the added complexity in care, I will continue to support Yo in the subsequent management and with ongoing continuity of care.          Subjective   Yo is a 45 year old, presenting for the following:  Physical        7/3/2025     5:00 PM   Additional Questions   Roomed by naveed   Accompanied by self          HPI    History of Present Illness-  Yo CLAYTON Cruzito, 45 years    Weight management and medication  - Reports feeling that the current dose of medication is insufficient and requests an increase.  - No side effects such as nausea, vomiting, constipation, or diarrhea from the current medication.  - Has been putting on weight recently, despite losing weight since January.  - Initially lost weight post-surgery, reaching 260 lbs, but later gained weight up to 280 lbs.  - Has a friend on a higher dose of the same  medication.    Abdominal concerns  - Reports a feeling of weakness in the stomach area.  - Concerned about the abdomen due to past hernia issues where intestines were involved.  - Believes the sensation might be due to weight gain.    Substance use and social circumstances  - Resides in a sober house following a relapse on drugs after the death of his father.  - Last used methamphetamine 18 months ago.  - Reports that methamphetamine use initially helped with anxiety.  - Experiencing difficulty finding housing due to past addiction issues.    Blood pressure history  - Previously on medication for high blood pressure about 10 years ago.  - Blood pressure normalized after weight loss but has been rising again recently.  - Attributes recent increase in blood pressure to job stress and dietary habits, including increased consumption of ice cream and Pepsi.    Social and lifestyle factors  - Reports food insecurity and housing instability.  - Attempting to secure an apartment but facing rejections.  - Has been declined by several housing applications despite paying application fees.        Lab Results   Component Value Date    HGB 12.7 (L) 01/31/2025    WBC 7.3 01/31/2025    MCV 82 01/31/2025    CR 0.84 01/31/2025    AST 21 02/12/2024    ALT 29 07/10/2024    TSH 2.18 02/12/2024    GFRESTIMATED >90 01/31/2025    VITDT 22 02/12/2024     Wt Readings from Last 4 Encounters:   07/03/25 124.1 kg (273 lb 8 oz)   01/31/25 128.4 kg (283 lb)   12/03/24 128.4 kg (283 lb)   11/25/24 130 kg (286 lb 9.6 oz)     BP Readings from Last 3 Encounters:   07/03/25 (!) 146/79   01/31/25 125/76   12/03/24 136/79       Advance Care Planning  Discussed advance care planning with patient; however, patient declined at this time.        7/3/2025   General Health   How would you rate your overall physical health? Good   Feel stress (tense, anxious, or unable to sleep) To some extent   (!) STRESS CONCERN      7/3/2025   Nutrition   Three or more  servings of calcium each day? Yes   Diet: Breakfast skipped   How many servings of fruit and vegetables per day? (!) 0-1   How many sweetened beverages each day? (!) 3         7/3/2025   Exercise   Days per week of moderate/strenous exercise 3 days         7/3/2025   Social Factors   Frequency of gathering with friends or relatives Once a week   Worry food won't last until get money to buy more Yes   Food not last or not have enough money for food? No   Do you have housing? (Housing is defined as stable permanent housing and does not include staying outside in a car, in a tent, in an abandoned building, in an overnight shelter, or couch-surfing.) Yes   Are you worried about losing your housing? Yes   Lack of transportation? No   Unable to get utilities (heat,electricity)? No   Want help with housing or utility concern? (!) YES   (!) FOOD SECURITY CONCERN PRESENT(!) HOUSING CONCERN PRESENT      7/3/2025   Dental   Dentist two times every year? (!) NO             7/3/2025   Substance Use   Alcohol more than 3/day or more than 7/wk Not Applicable   Do you use any other substances recreationally? No     Social History     Tobacco Use    Smoking status: Former     Current packs/day: 0.00     Types: Cigarettes     Quit date: 2018     Years since quittin.5     Passive exposure: Current    Smokeless tobacco: Never    Tobacco comments:     Pt in the process of quitting    Vaping Use    Vaping status: Every Day    Substances: Nicotine    Devices: Refillable tank   Substance Use Topics    Alcohol use: Never    Drug use: Never           7/3/2025   STI Screening   New sexual partner(s) since last STI/HIV test? (!) YES    ASCVD Risk   The 10-year ASCVD risk score (Mark MONAE, et al., 2019) is: 3.7%    Values used to calculate the score:      Age: 45 years      Sex: Male      Is Non- : No      Diabetic: No      Tobacco smoker: No      Systolic Blood Pressure: 146 mmHg      Is BP treated:  "No      HDL Cholesterol: 32 mg/dL      Total Cholesterol: 180 mg/dL        7/3/2025   Contraception/Family Planning   Questions about contraception or family planning No       Reviewed and updated as needed this visit by Provider                             Objective    Exam  BP (!) 146/79   Pulse 86   Temp 97.4  F (36.3  C) (Temporal)   Resp 20   Ht 1.725 m (5' 7.91\")   Wt 124.1 kg (273 lb 8 oz)   SpO2 95%   BMI 41.69 kg/m     Estimated body mass index is 41.69 kg/m  as calculated from the following:    Height as of this encounter: 1.725 m (5' 7.91\").    Weight as of this encounter: 124.1 kg (273 lb 8 oz).    Physical Exam  General Appearance:  Alert, cooperative, no distress, appears stated age.  Head:  Normocephalic, without obvious abnormality, atraumatic.  Eyes:  Conjunctivae/corneas clear, extraocular movements intact both eyes.  Lungs:  Clear to auscultation bilaterally, respirations unlabored.  Heart:  Regular rate and rhythm, S1 and S2 normal, no murmur, rub or gallop.  Abdomen:  Soft, non-tender, bowel sounds active all four quadrants.   Extremities:  Atraumatic, no cyanosis or edema.  Skin:  Skin color, texture, turgor normal, no rashes or lesions.  Neurologic: No focal deficits.  : Round, mobile, soft approximately 0.5 cm x 0.5 cm mass in the right epididymis.  Non TTP.  Chaperone was present for exam.          Signed Electronically by: Cornelia Payne MD    "

## 2025-07-03 NOTE — NURSING NOTE
Prior to immunization administration, verified patients identity using patient s name and date of birth. Please see Immunization Activity for additional information.     Screening Questionnaire for Adult Immunization    Are you sick today?   No   Do you have allergies to medications, food, a vaccine component or latex?   No   Have you ever had a serious reaction after receiving a vaccination?   No   Do you have a long-term health problem with heart, lung, kidney, or metabolic disease (e.g., diabetes), asthma, a blood disorder, no spleen, complement component deficiency, a cochlear implant, or a spinal fluid leak?  Are you on long-term aspirin therapy?   No   Do you have cancer, leukemia, HIV/AIDS, or any other immune system problem?   No   Do you have a parent, brother, or sister with an immune system problem?   No   In the past 3 months, have you taken medications that affect  your immune system, such as prednisone, other steroids, or anticancer drugs; drugs for the treatment of rheumatoid arthritis, Crohn s disease, or psoriasis; or have you had radiation treatments?   No   Have you had a seizure, or a brain or other nervous system problem?   No   During the past year, have you received a transfusion of blood or blood    products, or been given immune (gamma) globulin or antiviral drug?   No   For women: Are you pregnant or is there a chance you could become       pregnant during the next month?   No   Have you received any vaccinations in the past 4 weeks?   No     Immunization questionnaire answers were all negative.      Patient instructed to remain in clinic for 15 minutes afterwards, and to report any adverse reactions.     Screening performed by Jeremiah Yeung MA on 7/3/2025 at 5:05 PM.

## 2025-07-03 NOTE — PATIENT INSTRUCTIONS
Patient Education   Preventive Care Advice   This is general advice given by our system to help you stay healthy. However, your care team may have specific advice just for you. Please talk to your care team about your preventive care needs.  Nutrition  Eat 5 or more servings of fruits and vegetables each day.  Try wheat bread, brown rice and whole grain pasta (instead of white bread, rice, and pasta).  Get enough calcium and vitamin D. Check the label on foods and aim for 100% of the RDA (recommended daily allowance).  Lifestyle  Exercise at least 150 minutes each week  (30 minutes a day, 5 days a week).  Do muscle strengthening activities 2 days a week. These help control your weight and prevent disease.  No smoking.  Wear sunscreen to prevent skin cancer.  Have a dental exam and cleaning every 6 months.  Yearly exams  See your health care team every year to talk about:  Any changes in your health.  Any medicines your care team has prescribed.  Preventive care, family planning, and ways to prevent chronic diseases.  Shots (vaccines)   HPV shots (up to age 26), if you've never had them before.  Hepatitis B shots (up to age 59), if you've never had them before.  COVID-19 shot: Get this shot when it's due.  Flu shot: Get a flu shot every year.  Tetanus shot: Get a tetanus shot every 10 years.  Pneumococcal, hepatitis A, and RSV shots: Ask your care team if you need these based on your risk.  Shingles shot (for age 50 and up)  General health tests  Diabetes screening:  Starting at age 35, Get screened for diabetes at least every 3 years.  If you are younger than age 35, ask your care team if you should be screened for diabetes.  Cholesterol test: At age 39, start having a cholesterol test every 5 years, or more often if advised.  Bone density scan (DEXA): At age 50, ask your care team if you should have this scan for osteoporosis (brittle bones).  Hepatitis C: Get tested at least once in your life.  STIs (sexually  transmitted infections)  Before age 24: Ask your care team if you should be screened for STIs.  After age 24: Get screened for STIs if you're at risk. You are at risk for STIs (including HIV) if:  You are sexually active with more than one person.  You don't use condoms every time.  You or a partner was diagnosed with a sexually transmitted infection.  If you are at risk for HIV, ask about PrEP medicine to prevent HIV.  Get tested for HIV at least once in your life, whether you are at risk for HIV or not.  Cancer screening tests  Cervical cancer screening: If you have a cervix, begin getting regular cervical cancer screening tests starting at age 21.  Breast cancer scan (mammogram): If you've ever had breasts, begin having regular mammograms starting at age 40. This is a scan to check for breast cancer.  Colon cancer screening: It is important to start screening for colon cancer at age 45.  Have a colonoscopy test every 10 years (or more often if you're at risk) Or, ask your provider about stool tests like a FIT test every year or Cologuard test every 3 years.  To learn more about your testing options, visit:   .  For help making a decision, visit:   https://bit.ly/nm32137.  Prostate cancer screening test: If you have a prostate, ask your care team if a prostate cancer screening test (PSA) at age 55 is right for you.  Lung cancer screening: If you are a current or former smoker ages 50 to 80, ask your care team if ongoing lung cancer screenings are right for you.  For informational purposes only. Not to replace the advice of your health care provider. Copyright   2023 [employer]. All rights reserved. Clinically reviewed by the [facility] Transitions Program. [employer] [id number] - REV 01/24.

## 2025-07-03 NOTE — PROGRESS NOTES
***    Lab Results   Component Value Date    HGB 12.7 (L) 01/31/2025    WBC 7.3 01/31/2025    MCV 82 01/31/2025    CR 0.84 01/31/2025    AST 21 02/12/2024    ALT 29 07/10/2024    TSH 2.18 02/12/2024    GFRESTIMATED >90 01/31/2025    VITDT 22 02/12/2024     Wt Readings from Last 4 Encounters:   07/03/25 124.1 kg (273 lb 8 oz)   01/31/25 128.4 kg (283 lb)   12/03/24 128.4 kg (283 lb)   11/25/24 130 kg (286 lb 9.6 oz)     BP Readings from Last 3 Encounters:   07/03/25 (!) 146/79   01/31/25 125/76   12/03/24 136/79

## 2025-07-08 ENCOUNTER — PATIENT OUTREACH (OUTPATIENT)
Dept: CARE COORDINATION | Facility: CLINIC | Age: 45
End: 2025-07-08
Payer: COMMERCIAL

## 2025-07-08 ENCOUNTER — RESULTS FOLLOW-UP (OUTPATIENT)
Dept: FAMILY MEDICINE | Facility: CLINIC | Age: 45
End: 2025-07-08
Payer: COMMERCIAL

## 2025-07-08 NOTE — PROGRESS NOTES
Clinic Care Coordination Contact:  [facility]/Voicemail    Today's date: Tuesday, 7/08/2025    Reason: CC CHW Initial Outreach Called- Referral to Care Coordination (CC) Service  Referral provider/name: Cornelia Payne MD     Clinical Data: Care Coordinator Outreach:    Outreach Documentation Number of Outreach Attempt   7/8/2025  11:26 AM 1     Potential Patient Outreach:   Attempted #1: Patient was identified as a potential candidate and referred to Care Coordination (CC) Service. CHW attempted to connect with patient and no answer. Left message on patient's voicemail with call back information and requested return call.    Plan:  -Attempt #2: Care Coordinator team will try to reach patient again in 1-2 business days.        Cornelia Snider Community Health Worker (CHW Certified)  [facility] Care Coordination  Office: [phone]  Clinic: [phone]

## 2025-07-09 ENCOUNTER — PATIENT OUTREACH (OUTPATIENT)
Dept: CARE COORDINATION | Facility: CLINIC | Age: 45
End: 2025-07-09
Payer: COMMERCIAL

## 2025-07-09 NOTE — TELEPHONE ENCOUNTER
Contacts       Contact Date/Time Type Contact Phone/Fax    07/09/2025 01:13 PM CDT Phone (Outgoing) Yo East (Self) [phone] (M)    Left Message         Attempted to reach patient to: Discuss test results    Test name: Lab    When patient returns call, please take this action: OK to relay (verbatim):     Dr Payne  Your cholesterol levels have worsened.  I recommend Lifestyle modifications, including: Increasing intake of vegetables and fruits, decreasing intake of processed foods/carbohydrates/sugars, having regular physical activity, and losing weight.     Blood counts are normal now, no anemia.     Electrolytes, kidney function, liver function are normal.     Screening for hepatitis C, HIV, syphilis, chlamydia, and gonorrhea were all normal.    Charu Jacobsen RN  [facility]

## 2025-07-09 NOTE — TELEPHONE ENCOUNTER
Writer took incoming call back from patient regarding missed call from clinic today. Dr. Payne's message relayed to patient.     Patient verbalizes understanding, agrees with plan and has no further questions.    GEORGE HurtadoN, RN, PHN   [facility]

## 2025-07-09 NOTE — PROGRESS NOTES
"Clinic Care Coordination Contact:  Community Health Worker Initial Outreach    Today's date: Wednesday, 7/09/2025     Reason: CC CHW Initial Outreach Called- Referral to Care Coordination (CC) Service  Referral provider/name: Conrelia Payne MD     Potential Patient Outreach Discussion:   Attempted #2: Patient was identified as a potential candidate and referred to Care Coordination Service. I call and spoke with patient today, 7/09/2025 to discuss possible clinic care coordination enrollment. Have introduced myself to patient. Clinic care coordination service was described to the patient and immediate needs were discussed. Patient is aware CC team includes CHW, SW, RN, and FRW. The patient agreed enrollment into CC service. CHW explained initial assessment and the patient shared that he will need to call CHW back to CHW contact info showed up on his phone when he \"have some more time.\" CHW acknowledged. Patient agreed to CHW plan below if hearing no call from patient.     Patient shared:   -Need better housing in a better and more quiet area. Checked out a place yesterday and waiting for more info. Can I call you back when I have some more time?     Plan:   -Attempt #3: Care Coordination team will try to connect with patient again in the next 3-7 business days if hearing no call from patient.         Cornelia Snider Community Health Worker (CHW Certified)  [facility] Care Coordination  Office: [phone]  Clinic: [phone]        "

## 2025-07-15 ENCOUNTER — PATIENT OUTREACH (OUTPATIENT)
Dept: CARE COORDINATION | Facility: CLINIC | Age: 45
End: 2025-07-15
Payer: COMMERCIAL

## 2025-07-15 NOTE — PROGRESS NOTES
Clinic Care Coordination Contact:  [facility]/Voicemail    Today's date: 7/0152025     Reason: CC CHW Initial Outreach Called- Referral to Care Coordination (CC) Service  Referral provider/name: Cornelia Panye MD    Clinical Data: Care Coordinator Outreach:    Outreach Documentation Number of Outreach Attempt   7/8/2025  11:26 AM 1   7/15/2025   9:51 AM 3   Note/comment: Attempted #1 completed on 7/08/2025 and attempted #2 completed on 7/09/2025 by writer per patient chart reviewed.    Potential Patient Outreach:   Attempted #3: Left message on patient's voicemail with call back information and requested return call.    Plan:   -Patient return call if interested.  -Care Coordinator sent care coordination introduction letter on 7/15/2025 via mail. Care Coordinator will do no further outreaches at this time.        Cornelia Snider Community Health Worker (CHW Certified)  [facility] Care Coordination  Office: [phone]  Clinic: [phone]

## 2025-07-15 NOTE — LETTER
[facility] CARE COORDINATION  [address] [place] [address]     July 15, 2025    Yo East  [address]  [place] [address]      Dear Yo,    I am a clinic community health worker who works with Cornelia Payne MD with the [facility]. I recently tried to call and was unable to reach you. Below is a description of clinic care coordination and how I can further assist you.       The clinic care coordination team is made up of a registered nurse, , financial resource worker and community health worker who understand the health care system. The goal of clinic care coordination is to help you manage your health and improve access to the health care system. Our team works alongside your provider to assist you in determining your health and social needs. We can help you obtain health care and community resources, providing you with necessary information and education. We can work with you through any barriers and develop a care plan that helps coordinate and strengthen the communication between you and your care team.  Our services are voluntary and are offered without charge to you personally.    Please feel free to contact me with any questions or concerns regarding care coordination and what we can offer.      We are focused on providing you with the highest-quality healthcare experience possible. Thank you.         Sincerely,     Cornelia Snider Community Health Worker (CHW Certified)  [facility] Care Coordination  Office: [phone]  Clinic: [phone]

## 2025-07-28 DIAGNOSIS — E66.813 CLASS 3 SEVERE OBESITY WITHOUT SERIOUS COMORBIDITY WITH BODY MASS INDEX (BMI) OF 40.0 TO 44.9 IN ADULT, UNSPECIFIED OBESITY TYPE (H): ICD-10-CM

## 2025-07-29 LAB — NONINV COLON CA DNA+OCC BLD SCRN STL QL: NEGATIVE

## 2025-07-29 RX ORDER — TIRZEPATIDE 5 MG/.5ML
INJECTION, SOLUTION SUBCUTANEOUS
Qty: 2 ML | Refills: 0 | Status: SHIPPED | OUTPATIENT
Start: 2025-07-29

## 2025-08-01 ENCOUNTER — HOSPITAL ENCOUNTER (OUTPATIENT)
Dept: ULTRASOUND IMAGING | Facility: HOSPITAL | Age: 45
Discharge: HOME | End: 2025-08-01
Attending: STUDENT IN AN ORGANIZED HEALTH CARE EDUCATION/TRAINING PROGRAM | Admitting: STUDENT IN AN ORGANIZED HEALTH CARE EDUCATION/TRAINING PROGRAM
Payer: COMMERCIAL

## 2025-08-01 DIAGNOSIS — N50.89 TESTICULAR MASS: ICD-10-CM

## 2025-08-01 PROCEDURE — 93976 VASCULAR STUDY: CPT

## 2025-08-04 ENCOUNTER — TELEPHONE (OUTPATIENT)
Dept: FAMILY MEDICINE | Facility: CLINIC | Age: 45
End: 2025-08-04
Payer: COMMERCIAL

## 2025-08-18 ENCOUNTER — PATIENT OUTREACH (OUTPATIENT)
Dept: CARE COORDINATION | Facility: CLINIC | Age: 45
End: 2025-08-18
Payer: COMMERCIAL

## 2025-08-21 DIAGNOSIS — E66.813 CLASS 3 SEVERE OBESITY WITHOUT SERIOUS COMORBIDITY WITH BODY MASS INDEX (BMI) OF 40.0 TO 44.9 IN ADULT, UNSPECIFIED OBESITY TYPE (H): ICD-10-CM

## 2025-08-21 RX ORDER — TIRZEPATIDE 5 MG/.5ML
INJECTION, SOLUTION SUBCUTANEOUS
Qty: 2 ML | Refills: 0 | Status: SHIPPED | OUTPATIENT
Start: 2025-08-21